# Patient Record
Sex: FEMALE | Race: WHITE | NOT HISPANIC OR LATINO | Employment: FULL TIME | URBAN - METROPOLITAN AREA
[De-identification: names, ages, dates, MRNs, and addresses within clinical notes are randomized per-mention and may not be internally consistent; named-entity substitution may affect disease eponyms.]

---

## 2017-01-06 ENCOUNTER — OFFICE VISIT (OUTPATIENT)
Dept: URGENT CARE | Facility: URGENT CARE | Age: 47
End: 2017-01-06
Payer: COMMERCIAL

## 2017-01-06 VITALS
OXYGEN SATURATION: 98 % | HEART RATE: 99 BPM | BODY MASS INDEX: 55.18 KG/M2 | TEMPERATURE: 98.3 F | DIASTOLIC BLOOD PRESSURE: 92 MMHG | SYSTOLIC BLOOD PRESSURE: 124 MMHG | WEIGHT: 293 LBS

## 2017-01-06 DIAGNOSIS — Z86.79 HISTORY OF HYPERTENSION: ICD-10-CM

## 2017-01-06 DIAGNOSIS — B37.31 VAGINAL YEAST INFECTION: Primary | ICD-10-CM

## 2017-01-06 DIAGNOSIS — H65.193 ACUTE MIDDLE EAR EFFUSION, BILATERAL: ICD-10-CM

## 2017-01-06 PROCEDURE — 99213 OFFICE O/P EST LOW 20 MIN: CPT | Performed by: FAMILY MEDICINE

## 2017-01-06 RX ORDER — NEBULIZER AND COMPRESSOR
EACH MISCELLANEOUS
Qty: 1 KIT | Refills: 0 | Status: SHIPPED | OUTPATIENT
Start: 2017-01-06 | End: 2018-05-10

## 2017-01-06 RX ORDER — DAPAGLIFLOZIN 5 MG/1
TABLET, FILM COATED ORAL
Refills: 1 | COMMUNITY
Start: 2016-07-12 | End: 2017-01-06

## 2017-01-06 RX ORDER — FLUCONAZOLE 150 MG/1
150 TABLET ORAL ONCE
Qty: 1 TABLET | Refills: 1 | Status: SHIPPED | OUTPATIENT
Start: 2017-01-06 | End: 2017-01-06

## 2017-01-06 NOTE — MR AVS SNAPSHOT
After Visit Summary   1/6/2017    Antonietta Christie    MRN: [id number]           Patient Information     Date Of Birth          1970        Visit Information        Provider Department      1/6/2017 8:05 PM Jose Barrios MD [facility]        Today's Diagnoses     Vaginal yeast infection    -  1     Acute middle ear effusion, bilateral         History of hypertension           Care Instructions      Fluid in the Middle Ear, No Infection (Adult)  Earaches can happen without an infection. This occurs when air and fluid build up behind the eardrum causing a feeling of fullness and discomfort and reduced hearing. This is called otitis media with effusion (OME) or serous otitis media. It means there is fluid in the middle ear. It is not the same as acute otitis media, which is typically from infection.  OME can happen when you have a cold if congestion blocks the passage that drains the middle ear (eustachian tube). It may also occur with nasal allergies or after a bacterial middle ear infection.    The pain/discomfort may come and go. You may hear clicking or popping sounds when you chew or swallow. You may feel that your balance is off. Or you may hear ringing in the ear.  It often takes from several weeks up to 3 months for the fluid to clear on its own. Oral pain relievers and ear drops help if there is pain. Decongestants and antihistamines sometimes help. Antibiotics don't help since there is no infection. Your doctor may prescribe a nasal spray to help reduce swelling in the nose and eustachian tube. This can allow the ear to drain.  If it doesn't improve after 3 months, surgery may be used to drain the fluid and insert a small tube in the eardrum to allow continued drainage.  Because the middle ear fluid can become infected, it is important to watch for signs of an ear infection which may develop later. These signs include increased ear pain, fever, or drainage from the  ear.  Home care  The following guidelines will help you care for yourself at home:    You may use acetaminophen or ibuprofen to control pain, unless another medicine was prescribed. [NOTE: If you have chronic liver or kidney disease or ever had a stomach ulcer or GI bleeding, talk with your doctor before using these medicines.] (Aspirin should never be used in anyone under 18 years of age who is ill with a fever. It may cause severe liver damage.)    While not always helpful, you may use over-the-counter decongestants such as phenylephrine or pseudoephedrine. Don't use nasal spray decongestants more than 3 days. Longer use can make congestion worse. (Prescription nasal sprays from your doctor don't typically have those restrictions.)    Antihistamines may help if you are also having allergy symptoms.    You may use medicines such as guaifenesin to thin mucus and promote drainage.  Follow-up care  Follow up with your doctor or as advised if you are not feeling better after 3 days.  When to seek medical care  Get prompt medical attention if any of the following occur:    Ear pain gets worse or does not start to improve     Fever of 100.4 F (38 C) or higher, or as directed by your health care provider    Fluid or blood draining from the ear    Headache or sinus pain    Stiff neck    Unusual drowsiness or confusion    6807-9926 [employer]. [address] [place] [address], [place] [address]. All rights reserved. This information is not intended as a substitute for professional medical care. Always follow your healthcare professional's instructions.              Follow-ups after your visit        Who to contact     If you have questions or need follow up information about today's clinic visit or your schedule please contact [facility] directly at [phone].  Normal or non-critical lab and imaging results will be communicated to you by MyChart, letter or phone within 4 business days after the  clinic has received the results. If you do not hear from us within 7 days, please contact the clinic through [facility] or phone. If you have a critical or abnormal lab result, we will notify you by phone as soon as possible.  Submit refill requests through [facility] or call your pharmacy and they will forward the refill request to us. Please allow 3 business days for your refill to be completed.          Additional Information About Your Visit        [employer] Information     [facility] gives you secure access to your electronic health record. If you see a primary care provider, you can also send messages to your care team and make appointments. If you have questions, please call your primary care clinic.  If you do not have a primary care provider, please call [phone] and they will assist you.        Care EveryWhere ID     This is your Care EveryWhere ID. This could be used by other organizations to access your [facility] medical records  [id number]        Your Vitals Were     Pulse Temperature Pulse Oximetry Breastfeeding?          99 98.3  F (36.8  C) (Oral) 98% No         Blood Pressure from Last 3 Encounters:   01/06/17 124/92   12/09/16 138/84   12/01/16 136/88    Weight from Last 3 Encounters:   01/06/17 379 lb (171.913 kg)   12/09/16 380 lb (172.367 kg)   12/01/16 380 lb (172.367 kg)              Today, you had the following     No orders found for display         Today's Medication Changes          These changes are accurate as of: 1/6/17  8:44 PM.  If you have any questions, ask your nurse or doctor.               Start taking these medicines.        Dose/Directions    Adult Blood Pressure Cuff Lg Kit   Used for:  History of hypertension   Started by:  Jose Barrios MD        Use as directed   Quantity:  1 kit   Refills:  0       fluconazole 150 MG tablet   Commonly known as:  DIFLUCAN   Used for:  Vaginal yeast infection   Started by:  Jose Barrios MD        Dose:  150 mg   Take 1 tablet  (150 mg) by mouth once for 1 dose   Quantity:  1 tablet   Refills:  1            Where to get your medicines      These medications were sent to [id number] - [place], [place] - [address] [address]     Phone:  [phone]    - Adult Blood Pressure Cuff Lg Kit  - fluconazole 150 MG tablet             Primary Care Provider Office Phone # Fax #    Aster Olivia España [phone]       [facility] [address]  [facility] [address]        Thank you!     Thank you for choosing [facility]  for your care. Our goal is always to provide you with excellent care. Hearing back from our patients is one way we can continue to improve our services. Please take a few minutes to complete the written survey that you may receive in the mail after your visit with us. Thank you!             Your Updated Medication List - Protect others around you: Learn how to safely use, store and throw away your medicines at www.disposemymeds.org.          This list is accurate as of: 1/6/17  8:44 PM.  Always use your most recent med list.                   Brand Name Dispense Instructions for use    Adult Blood Pressure Cuff Lg Kit     1 kit    Use as directed       aspirin 81 MG EC tablet     90 tablet    Take 1 tablet (81 mg) by mouth daily       dapagliflozin 10 MG Tabs tablet    FARXIGA    90 tablet    Take 1 tablet (10 mg) by mouth daily       fluconazole 150 MG tablet    DIFLUCAN    1 tablet    Take 1 tablet (150 mg) by mouth once for 1 dose       gabapentin 300 MG capsule    NEURONTIN    270 capsule    Take one capsule in the morning and 2 at night by mouth       hydrochlorothiazide 12.5 MG Tabs tablet     90 tablet    Take 1 tablet (12.5 mg) by mouth daily       ketoconazole 2 % Foam     100 g    Apply 1 Squirt topically 2 times daily       losartan 50 MG tablet    COZAAR    90 tablet    Take 1 tablet (50 mg) by mouth  daily       omeprazole 20 MG tablet     90 tablet    Take 1 tablet (20 mg) by mouth daily Take 30-60 minutes before a meal.       * order for DME      Resmed S9 Auto CPAP 8-12cm H2O, [employer] F[employer] for her small nasal mask       * order for DME     1 Device    Equipment being ordered: Glucometer per insurance preference, lancets, test strips.  Patient to check sugars oncedaily, 90 day supply for test strips and lancets, refill 3 times       * order for DME     1 Device    Equipment being ordered: blood pressure cuff/monitor       rOPINIRole 5 MG tablet    REQUIP    90 tablet    TAKE ONE TABLET BY MOUTH AT BEDTIME AS NEEDED FOR RESTLESS LEGS       STATIN NOT PRESCRIBED (INTENTIONAL)      Statin not prescribed intentionally due to Other patient declined  (This option does not exclude patient from measure)       * Notice:  This list has 3 medication(s) that are the same as other medications prescribed for you. Read the directions carefully, and ask your doctor or other care provider to review them with you.

## 2017-01-07 NOTE — PATIENT INSTRUCTIONS
Fluid in the Middle Ear, No Infection (Adult)  Earaches can happen without an infection. This occurs when air and fluid build up behind the eardrum causing a feeling of fullness and discomfort and reduced hearing. This is called otitis media with effusion (OME) or serous otitis media. It means there is fluid in the middle ear. It is not the same as acute otitis media, which is typically from infection.  OME can happen when you have a cold if congestion blocks the passage that drains the middle ear (eustachian tube). It may also occur with nasal allergies or after a bacterial middle ear infection.    The pain/discomfort may come and go. You may hear clicking or popping sounds when you chew or swallow. You may feel that your balance is off. Or you may hear ringing in the ear.  It often takes from several weeks up to 3 months for the fluid to clear on its own. Oral pain relievers and ear drops help if there is pain. Decongestants and antihistamines sometimes help. Antibiotics don't help since there is no infection. Your doctor may prescribe a nasal spray to help reduce swelling in the nose and eustachian tube. This can allow the ear to drain.  If it doesn't improve after 3 months, surgery may be used to drain the fluid and insert a small tube in the eardrum to allow continued drainage.  Because the middle ear fluid can become infected, it is important to watch for signs of an ear infection which may develop later. These signs include increased ear pain, fever, or drainage from the ear.  Home care  The following guidelines will help you care for yourself at home:    You may use acetaminophen or ibuprofen to control pain, unless another medicine was prescribed. [NOTE: If you have chronic liver or kidney disease or ever had a stomach ulcer or GI bleeding, talk with your doctor before using these medicines.] (Aspirin should never be used in anyone under 18 years of age who is ill with a fever. It may cause severe liver  damage.)    While not always helpful, you may use over-the-counter decongestants such as phenylephrine or pseudoephedrine. Don't use nasal spray decongestants more than 3 days. Longer use can make congestion worse. (Prescription nasal sprays from your doctor don't typically have those restrictions.)    Antihistamines may help if you are also having allergy symptoms.    You may use medicines such as guaifenesin to thin mucus and promote drainage.  Follow-up care  Follow up with your doctor or as advised if you are not feeling better after 3 days.  When to seek medical care  Get prompt medical attention if any of the following occur:    Ear pain gets worse or does not start to improve     Fever of 100.4 F (38 C) or higher, or as directed by your health care provider    Fluid or blood draining from the ear    Headache or sinus pain    Stiff neck    Unusual drowsiness or confusion    8592-9171 [employer]. [address] [place] [address], [place] [address]. All rights reserved. This information is not intended as a substitute for professional medical care. Always follow your healthcare professional's instructions.

## 2017-01-07 NOTE — NURSING NOTE
"Chief Complaint   Patient presents with     Otalgia     left ear       Initial /96 mmHg  Pulse 99  Temp(Src) 98.3  F (36.8  C) (Oral)  Wt 379 lb (171.913 kg)  SpO2 98%  Breastfeeding? No Estimated body mass index is 55.18 kg/(m^2) as calculated from the following:    Height as of 12/9/16: 5' 9.5\" (1.765 m).    Weight as of this encounter: 379 lb (171.913 kg).  BP completed using cuff size: regular on forearm  Laura Flower CMA      "

## 2017-01-07 NOTE — PROGRESS NOTES
Some of this note was populated by a medical assistant.      SUBJECTIVE:                                                    Antonietta Christie is a 46 year old female who presents to clinic today for the following health issues:      RESPIRATORY SYMPTOMS      Duration: increasing over few weeks    Description  ear pain left    Severity: moderate    Accompanying signs and symptoms: None    History (predisposing factors):  none    Precipitating or alleviating factors: None    Therapies tried and outcome:  none     Problem list and histories reviewed & adjusted, as indicated.  Additional history: as documented    Patient Active Problem List   Diagnosis     GERD (gastroesophageal reflux disease)     CARDIOVASCULAR SCREENING; LDL GOAL LESS THAN 160     Bunion of great toe of left foot     Insomnia     Restless legs syndrome (RLS)     Peripheral edema     Carpal tunnel syndrome     Obesity, morbid (H)     Hypertension goal BP (blood pressure) < 140/90     Seborrheic keratosis     CHELO (obstructive sleep apnea)- Moderate (AHI 26)     Type 2 diabetes, HbA1c goal < 7% (H)     Type 2 diabetes mellitus with hyperglycemia, without long-term current use of insulin (H)     Morbid obesity with BMI of 50.0-59.9, adult (H)     Past Surgical History   Procedure Laterality Date     Tonsillectomy & adenoidectomy  1979       Social History   Substance Use Topics     Smoking status: Never Smoker      Smokeless tobacco: Never Used     Alcohol Use: Yes      Comment: occasional     Family History   Problem Relation Age of Onset     Family History Negative No family hx of          Current Outpatient Prescriptions   Medication Sig Dispense Refill     order for DME Equipment being ordered: blood pressure cuff/monitor 1 Device 0     dapagliflozin (FARXIGA) 10 MG TABS tablet Take 1 tablet (10 mg) by mouth daily 90 tablet 1     gabapentin (NEURONTIN) 300 MG capsule Take one capsule in the morning and 2 at night by mouth 270 capsule 3     omeprazole 20  MG tablet Take 1 tablet (20 mg) by mouth daily Take 30-60 minutes before a meal. 90 tablet 3     hydrochlorothiazide 12.5 MG TABS tablet Take 1 tablet (12.5 mg) by mouth daily 90 tablet 3     rOPINIRole (REQUIP) 5 MG tablet TAKE ONE TABLET BY MOUTH AT BEDTIME AS NEEDED FOR RESTLESS LEGS 90 tablet 1     ketoconazole 2 % FOAM Apply 1 Squirt topically 2 times daily 100 g 1     losartan (COZAAR) 50 MG tablet Take 1 tablet (50 mg) by mouth daily 90 tablet 0     STATIN NOT PRESCRIBED, INTENTIONAL, Statin not prescribed intentionally due to Other patient declined   (This option does not exclude patient from measure)  0     aspirin 81 MG EC tablet Take 1 tablet (81 mg) by mouth daily 90 tablet 3     order for DME Equipment being ordered: Glucometer per insurance preference, lancets, test strips.  Patient to check sugars oncedaily, 90 day supply for test strips and lancets, refill 3 times 1 Device 0     ORDER FOR DME Resmed S9 Auto CPAP 8-12cm H2O, [employer] Fx for her small nasal mask       Allergies   Allergen Reactions     Lisinopril Cough     No Clinical Screening - See Comments Other (See Comments)     Hay fever (fall)--runny nose, sneezing, itchy eyes  Manganese violet dye in make up--red, itchy, mattery eyes       ROS:  Constitutional, HEENT, cardiovascular, pulmonary, gi and gu systems are negative, except as otherwise noted.    OBJECTIVE:                                                    /96 mmHg  Pulse 99  Temp(Src) 98.3  F (36.8  C) (Oral)  Wt 379 lb (171.913 kg)  SpO2 98%  Breastfeeding? No  Body mass index is 55.18 kg/(m^2).  GENERAL: healthy, alert and no distress  NECK: no adenopathy, no asymmetry, masses, or scars and thyroid normal to palpation  RESP: lungs clear to auscultation - no rales, rhonchi or wheezes  CV: regular rate and rhythm, normal S1 S2, no S3 or S4, no murmur, click or rub, no peripheral edema and peripheral pulses strong  ABDOMEN: soft, nontender, no hepatosplenomegaly, no masses  and bowel sounds normal  MS: no gross musculoskeletal defects noted, no edema          ASSESSMENT/PLAN:                                                        ICD-10-CM    1. Vaginal yeast infection B37.3 fluconazole (DIFLUCAN) 150 MG tablet   2. Acute middle ear effusion, bilateral H65.193    3. History of hypertension Z86.79 Blood Pressure Monitoring (ADULT BLOOD PRESSURE CUFF LG) KIT         PLAN  Patient educational/instructional material provided including reasons for follow-up   The patient indicates understanding of these issues and agrees with the plan.  Jose Barrios MD      [facility]

## 2017-02-16 DIAGNOSIS — E11.65 TYPE 2 DIABETES MELLITUS WITH HYPERGLYCEMIA (H): ICD-10-CM

## 2017-02-16 NOTE — TELEPHONE ENCOUNTER
aspirin 81 MG EC tablet      Last Written Prescription Date: 02/23/16  Last Fill Quantity: 90, # refills: 3  Last Office Visit with G, P or [facility] prescribing provider: 12/09/16        BP Readings from Last 3 Encounters:   01/06/17 (!) 124/92   12/09/16 138/84   12/01/16 136/88     Lab Results   Component Value Date    AST 63 02/22/2016     Lab Results   Component Value Date     02/22/2016     Creatinine   Date Value Ref Range Status   12/01/2016 0.78 0.52 - 1.04 mg/dL Final         Gracie yS  [facility]

## 2017-02-24 DIAGNOSIS — E11.65 TYPE 2 DIABETES MELLITUS WITH HYPERGLYCEMIA (H): ICD-10-CM

## 2017-02-24 DIAGNOSIS — I10 ESSENTIAL HYPERTENSION WITH GOAL BLOOD PRESSURE LESS THAN 140/90: ICD-10-CM

## 2017-02-24 NOTE — TELEPHONE ENCOUNTER
losartan (COZAAR) 50 MG tablet      Last Written Prescription Date: 7/6/16  Last Fill Quantity: 90, # refills: 0  Last Office Visit with G, P or [facility] prescribing provider: 12/9/16       Potassium   Date Value Ref Range Status   12/01/2016 4.5 3.4 - 5.3 mmol/L Final     Creatinine   Date Value Ref Range Status   12/01/2016 0.78 0.52 - 1.04 mg/dL Final     BP Readings from Last 3 Encounters:   01/06/17 (!) 124/92   12/09/16 138/84   12/01/16 136/88           Jayson Faarax  [facility]

## 2017-02-28 RX ORDER — LOSARTAN POTASSIUM 50 MG/1
50 TABLET ORAL DAILY
Qty: 30 TABLET | Refills: 0 | Status: SHIPPED | OUTPATIENT
Start: 2017-02-28 | End: 2017-05-24

## 2017-02-28 NOTE — TELEPHONE ENCOUNTER
Routing refill request to provider for review/approval because:  A break in medication  BP above goal  Geovanna Purcell RN

## 2017-03-17 ENCOUNTER — TELEPHONE (OUTPATIENT)
Dept: FAMILY MEDICINE | Facility: CLINIC | Age: 47
End: 2017-03-17

## 2017-03-17 NOTE — LETTER
[facility]  [address]  [facility] [address]  [phone]          Antonietta Christie  [address]  [facility] [address]          03/17/17      Dear Antonietta Christie        At [facility] we care about your health and are committed to providing quality patient care. Regular appointments are a vital part of the care and management of your health and can help prevent many of the complications that can occur.      It has come to our attention that you are due for an office visit for physical and diabetes follow up. Please call [facility] at [phone] soon to schedule your appointment.    If you have transferred care to another clinic please call to inform us so that we do not continue to send you reminder letters.      Sincerely,      [facility] Care Team

## 2017-03-17 NOTE — TELEPHONE ENCOUNTER
Panel Management Review      Fail List measure: Physical with pap & Diabetes.      Patient is due/failing the following:   A1C, PAP and PHYSICAL    Action needed:   Patient needs office visit for Physical with pap & Diabetes follow up.   When pt calls back please schedule physical or Diabetes follow up, please schedule labs before any appointment.    Type of outreach:    Phone, left message for patient to call back.  and Sent letter.    Questions for provider review:    None                                                                                                                                    Kelsey Arboleda CMA

## 2017-04-05 DIAGNOSIS — E11.65 TYPE 2 DIABETES MELLITUS WITH HYPERGLYCEMIA, WITHOUT LONG-TERM CURRENT USE OF INSULIN (H): ICD-10-CM

## 2017-04-05 NOTE — TELEPHONE ENCOUNTER
dapagliflozin (FARXIGA) 10 MG TABS tablet         Last Written Prescription Date: 12/09/16  Last Fill Quantity: 90, # refills: 1  Last Office Visit with [facility], [facility] or [facility] prescribing provider:  12/09/16        BP Readings from Last 3 Encounters:   01/06/17 (!) 124/92   12/09/16 138/84   12/01/16 136/88     Lab Results   Component Value Date    MICROL 13 12/01/2016     Lab Results   Component Value Date    UMALCR 13.40 12/01/2016     Creatinine   Date Value Ref Range Status   12/01/2016 0.78 0.52 - 1.04 mg/dL Final   ]  GFR Estimate   Date Value Ref Range Status   12/01/2016 79 >60 mL/min/1.7m2 Final     Comment:     Non  GFR Calc   02/22/2016 85 >60 mL/min/1.7m2 Final     Comment:     Non  GFR Calc   08/21/2015 80 >60 mL/min/1.7m2 Final     Comment:     Non  GFR Calc     GFR Estimate If Black   Date Value Ref Range Status   12/01/2016 >90   GFR Calc   >60 mL/min/1.7m2 Final   02/22/2016 >90   GFR Calc   >60 mL/min/1.7m2 Final   08/21/2015 >90   GFR Calc   >60 mL/min/1.7m2 Final     Lab Results   Component Value Date    CHOL 175 12/01/2016     Lab Results   Component Value Date    HDL 37 12/01/2016     Lab Results   Component Value Date     12/01/2016     Lab Results   Component Value Date    TRIG 154 12/01/2016     Lab Results   Component Value Date    CHOLHDLRATIO 5.1 07/01/2015     Lab Results   Component Value Date    AST 63 02/22/2016     Lab Results   Component Value Date     02/22/2016     Lab Results   Component Value Date    A1C 8.1 12/01/2016    A1C 7.9 02/22/2016    A1C 6.8 07/01/2015    A1C 6.2 08/13/2013    A1C 5.1 10/11/2011     Potassium   Date Value Ref Range Status   12/01/2016 4.5 3.4 - 5.3 mmol/L Final         [facility]

## 2017-04-07 RX ORDER — DAPAGLIFLOZIN 10 MG/1
TABLET, FILM COATED ORAL
Qty: 30 TABLET | Refills: 0 | Status: SHIPPED | OUTPATIENT
Start: 2017-04-07 | End: 2017-06-14

## 2017-04-07 NOTE — TELEPHONE ENCOUNTER
Medication is being filled for 1 time refill only due to:  Patient needs to be seen because she is due for an office visit.     MIAH Dowell, Clinical RN [facility].

## 2017-05-24 DIAGNOSIS — I10 ESSENTIAL HYPERTENSION WITH GOAL BLOOD PRESSURE LESS THAN 140/90: ICD-10-CM

## 2017-05-24 NOTE — TELEPHONE ENCOUNTER
losartan (COZAAR) 50 MG tablet      Last Written Prescription Date: 2/28/17  Last Fill Quantity: 30, # refills: 0  Last Office Visit with G, P or [facility] prescribing provider: 1/6/17 Hazel       Potassium   Date Value Ref Range Status   12/01/2016 4.5 3.4 - 5.3 mmol/L Final     Creatinine   Date Value Ref Range Status   12/01/2016 0.78 0.52 - 1.04 mg/dL Final     BP Readings from Last 3 Encounters:   01/06/17 (!) 124/92   12/09/16 138/84   12/01/16 136/88

## 2017-05-25 RX ORDER — LOSARTAN POTASSIUM 50 MG/1
TABLET ORAL
Qty: 30 TABLET | Refills: 0 | Status: SHIPPED | OUTPATIENT
Start: 2017-05-25 | End: 2017-06-14

## 2017-05-25 NOTE — TELEPHONE ENCOUNTER
Routing refill request to provider for review/approval because:  BP elevated at last visit    MIAH Dowell, Clinical RN [facility].

## 2017-06-03 DIAGNOSIS — G25.81 RESTLESS LEGS SYNDROME (RLS): ICD-10-CM

## 2017-06-03 NOTE — TELEPHONE ENCOUNTER
rOPINIRole (REQUIP) 5 MG tablet     Last Written Prescription Date: 12/1/16  Last Fill Quantity: 90, # refills: 1  Last Office Visit with [facility], [facility] or [facility] prescribing provider: 12/9/16        BP Readings from Last 3 Encounters:   01/06/17 (!) 124/92   12/09/16 138/84   12/01/16 136/88         Ida Ware  [facility]

## 2017-06-06 RX ORDER — ROPINIROLE 5 MG/1
TABLET, FILM COATED ORAL
Qty: 90 TABLET | Refills: 0 | Status: SHIPPED | OUTPATIENT
Start: 2017-06-06 | End: 2017-08-29

## 2017-06-06 NOTE — TELEPHONE ENCOUNTER
Reason for Call:  Other prescription    Detailed comments: Calling to ask for Rx of  rOPINIRole (REQUIP) 5 MG tablet to be filled completely out of medication and that she did make appointment for 06/14      Phone Number Patient can be reached at: Home number on file [phone] (home)      Best Time: Any    Can we leave a detailed message on this number? YES    Call taken on 6/6/2017 at 2:46 PM by Terra Knapp

## 2017-06-14 ENCOUNTER — OFFICE VISIT (OUTPATIENT)
Dept: FAMILY MEDICINE | Facility: CLINIC | Age: 47
End: 2017-06-14
Payer: COMMERCIAL

## 2017-06-14 VITALS
TEMPERATURE: 100.3 F | SYSTOLIC BLOOD PRESSURE: 128 MMHG | HEART RATE: 120 BPM | HEIGHT: 69 IN | BODY MASS INDEX: 43.4 KG/M2 | OXYGEN SATURATION: 95 % | DIASTOLIC BLOOD PRESSURE: 80 MMHG | WEIGHT: 293 LBS

## 2017-06-14 DIAGNOSIS — E11.65 TYPE 2 DIABETES MELLITUS WITH HYPERGLYCEMIA, WITHOUT LONG-TERM CURRENT USE OF INSULIN (H): Primary | ICD-10-CM

## 2017-06-14 DIAGNOSIS — I10 ESSENTIAL HYPERTENSION WITH GOAL BLOOD PRESSURE LESS THAN 140/90: ICD-10-CM

## 2017-06-14 DIAGNOSIS — F34.1 DYSTHYMIA: ICD-10-CM

## 2017-06-14 DIAGNOSIS — N76.0 VAGINITIS AND VULVOVAGINITIS: ICD-10-CM

## 2017-06-14 DIAGNOSIS — B35.4 TINEA CORPORIS: ICD-10-CM

## 2017-06-14 DIAGNOSIS — E66.01 MORBID OBESITY WITH BMI OF 50.0-59.9, ADULT (H): ICD-10-CM

## 2017-06-14 LAB
ANION GAP SERPL CALCULATED.3IONS-SCNC: 7 MMOL/L (ref 3–14)
BUN SERPL-MCNC: 10 MG/DL (ref 7–30)
CALCIUM SERPL-MCNC: 9.5 MG/DL (ref 8.5–10.1)
CHLORIDE SERPL-SCNC: 104 MMOL/L (ref 94–109)
CO2 SERPL-SCNC: 27 MMOL/L (ref 20–32)
CREAT SERPL-MCNC: 0.78 MG/DL (ref 0.52–1.04)
GFR SERPL CREATININE-BSD FRML MDRD: 79 ML/MIN/1.7M2
GLUCOSE SERPL-MCNC: 144 MG/DL (ref 70–99)
HBA1C MFR BLD: 7.2 % (ref 4.3–6)
POTASSIUM SERPL-SCNC: 4.2 MMOL/L (ref 3.4–5.3)
SODIUM SERPL-SCNC: 138 MMOL/L (ref 133–144)

## 2017-06-14 PROCEDURE — 83036 HEMOGLOBIN GLYCOSYLATED A1C: CPT | Performed by: FAMILY MEDICINE

## 2017-06-14 PROCEDURE — 80048 BASIC METABOLIC PNL TOTAL CA: CPT | Performed by: FAMILY MEDICINE

## 2017-06-14 PROCEDURE — 36415 COLL VENOUS BLD VENIPUNCTURE: CPT | Performed by: FAMILY MEDICINE

## 2017-06-14 PROCEDURE — 99214 OFFICE O/P EST MOD 30 MIN: CPT | Performed by: FAMILY MEDICINE

## 2017-06-14 RX ORDER — FLUCONAZOLE 150 MG/1
150 TABLET ORAL
Qty: 2 TABLET | Refills: 0 | Status: SHIPPED | OUTPATIENT
Start: 2017-06-14 | End: 2017-06-18

## 2017-06-14 RX ORDER — ATORVASTATIN CALCIUM 10 MG/1
10 TABLET, FILM COATED ORAL AT BEDTIME
Qty: 90 TABLET | Refills: 1 | Status: SHIPPED | OUTPATIENT
Start: 2017-06-14 | End: 2018-01-31

## 2017-06-14 RX ORDER — DAPAGLIFLOZIN 10 MG/1
10 TABLET, FILM COATED ORAL DAILY
Qty: 90 TABLET | Refills: 1 | Status: SHIPPED | OUTPATIENT
Start: 2017-06-14 | End: 2017-11-17

## 2017-06-14 RX ORDER — LOSARTAN POTASSIUM 50 MG/1
50 TABLET ORAL DAILY
Qty: 90 TABLET | Refills: 1 | Status: SHIPPED | OUTPATIENT
Start: 2017-06-14 | End: 2018-01-24

## 2017-06-14 NOTE — PROGRESS NOTES
SUBJECTIVE:                                                    Antonietta Christie is a 47 year old female who presents to clinic today for the following health issues:      Diabetes/obesity Follow-up      Patient is checking blood sugars: not at all    Diabetic concerns: frequent infections     Symptoms of hypoglycemia (low blood sugar): weak     Paresthesias (numbness or burning in feet) or sores: No     Date of last diabetic eye exam: done    Has not been checking sugars,     Hypertension Follow-up      Outpatient blood pressures are not being checked.    Low Salt Diet: not monitoring salt       Amount of exercise or physical activity: None    Problems taking medications regularly: No    Medication side effects: none    Diet: regular (no restrictions)    Worried that she might be depressed.  Symptoms present for over 1 year.  No suicidal or homicidal ideation.  Has not been eating right, paying bills, brushing teeth, etc.      Skin issue: for last few months, scaly scalp, curd like vaginal discharge and red rash in left axilla.    Problem list and histories reviewed & adjusted, as indicated.  Additional history: as documented    Patient Active Problem List   Diagnosis     GERD (gastroesophageal reflux disease)     CARDIOVASCULAR SCREENING; LDL GOAL LESS THAN 160     Bunion of great toe of left foot     Insomnia     Restless legs syndrome (RLS)     Peripheral edema     Carpal tunnel syndrome     Obesity, morbid (H)     Hypertension goal BP (blood pressure) < 140/90     Seborrheic keratosis     CHELO (obstructive sleep apnea)- Moderate (AHI 26)     Type 2 diabetes, HbA1c goal < 7% (H)     Type 2 diabetes mellitus with hyperglycemia, without long-term current use of insulin (H)     Morbid obesity with BMI of 50.0-59.9, adult (H)     Past Surgical History:   Procedure Laterality Date     TONSILLECTOMY & ADENOIDECTOMY  1979       Social History   Substance Use Topics     Smoking status: Never Smoker     Smokeless tobacco:  "Never Used     Alcohol use Yes      Comment: occasional     Family History   Problem Relation Age of Onset     Family History Negative No family hx of            Reviewed and updated as needed this visit by clinical staff       Reviewed and updated as needed this visit by Provider         ROS:  Constitutional, HEENT, cardiovascular, pulmonary, GI, , musculoskeletal, neuro, skin, endocrine and psych systems are negative, except as otherwise noted.    OBJECTIVE:                                                    /80 (BP Location: Left arm, Patient Position: Chair, Cuff Size: Adult Large)  Pulse 120  Temp 100.3  F (37.9  C) (Oral)  Ht 5' 9.29\" (1.76 m)  Wt (!) 370 lb (167.8 kg)  SpO2 95%  Breastfeeding? No  BMI 54.18 kg/m2  Body mass index is 54.18 kg/(m^2).  GENERAL: healthy, alert, no distress and obese  NECK: no adenopathy, no asymmetry, masses, or scars and thyroid normal to palpation  RESP: lungs clear to auscultation - no rales, rhonchi or wheezes  CV: regular rate and rhythm, normal S1 S2, no S3 or S4, no murmur, click or rub, no peripheral edema and peripheral pulses strong  ABDOMEN: soft, nontender, no hepatosplenomegaly, no masses and bowel sounds normal  MS: no gross musculoskeletal defects noted, no edema  SKIN: scaly rash right scalp, red macular rash with odor left axilla  PSYCH: mentation appears normal and tearful  Diabetic foot exam: normal DP and PT pulses, no trophic changes or ulcerative lesions, normal sensory exam and normal monofilament exam    Diagnostic Test Results:  Results for orders placed or performed in visit on 06/14/17 (from the past 24 hour(s))   Hemoglobin A1c   Result Value Ref Range    Hemoglobin A1C 7.2 (H) 4.3 - 6.0 %        ASSESSMENT/PLAN:                                                    1. Type 2 diabetes mellitus with hyperglycemia, without long-term current use of insulin (H)  Improved - continue current medication and make low carb eating changes.  - " Hemoglobin A1c; Future  - Basic metabolic panel; Future  - Hemoglobin A1c  - Basic metabolic panel  - atorvastatin (LIPITOR) 10 MG tablet; Take 1 tablet (10 mg) by mouth At Bedtime  Dispense: 90 tablet; Refill: 1  - dapagliflozin (FARXIGA) 10 MG TABS tablet; Take 1 tablet (10 mg) by mouth daily  Dispense: 90 tablet; Refill: 1    2. Morbid obesity with BMI of 50.0-59.9, adult (H)  Low carb eating changes    3. Dysthymia  Start zoloft and referral for therapy  - sertraline (ZOLOFT) 50 MG tablet; Take 1/2 tablet (25 mg) for 4 days, then increase to 1 tablet orally daily  Dispense: 30 tablet; Refill: 0  - MENTAL HEALTH REFERRAL    4. Vaginitis and vulvovaginitis  Oral treatment   - fluconazole (DIFLUCAN) 150 MG tablet; Take 1 tablet (150 mg) by mouth every 3 days for 2 doses  Dispense: 2 tablet; Refill: 0    5. Essential hypertension with goal blood pressure less than 140/90  Stable refilled  - losartan (COZAAR) 50 MG tablet; Take 1 tablet (50 mg) by mouth daily  Dispense: 90 tablet; Refill: 1    6. Tinea  Oral treatment and call in not improved in 5 days as abx may be needed.    The uses and side effects, including black box warnings as appropriate, were discussed in detail.  All patient questions were answered.  The patient was instructed to call immediately if any side effects developed.     FUTURE APPOINTMENTS:       - Follow-up visit in 1 month.    Aster España MD  [facility]

## 2017-06-14 NOTE — NURSING NOTE
"Chief Complaint   Patient presents with     Diabetes     Follow up.     Hypertension     Follow up.       Initial /80 (BP Location: Left arm, Patient Position: Chair, Cuff Size: Adult Large)  Pulse 120  Temp 100.3  F (37.9  C) (Oral)  Ht 5' 9.29\" (1.76 m)  Wt (!) 370 lb (167.8 kg)  SpO2 95%  Breastfeeding? No  BMI 54.18 kg/m2 Estimated body mass index is 54.18 kg/(m^2) as calculated from the following:    Height as of this encounter: 5' 9.29\" (1.76 m).    Weight as of this encounter: 370 lb (167.8 kg).  Medication Reconciliation: complete   An,CMA (AMAA)      "

## 2017-06-14 NOTE — MR AVS SNAPSHOT
After Visit Summary   6/14/2017    Antonietta Christie    MRN: [id number]           Patient Information     Date Of Birth          1970        Visit Information        Provider Department      6/14/2017 4:00 PM Aster Espinal MD [facility]        Today's Diagnoses     Type 2 diabetes mellitus with hyperglycemia, without long-term current use of insulin (H)    -  1    Morbid obesity with BMI of 50.0-59.9, adult (H)        Dysthymia        Vaginitis and vulvovaginitis        Essential hypertension with goal blood pressure less than 140/90        Tinea corporis          Care Instructions    Based on your medical history and these are the current health maintenance or preventive care services that you are due for (some may have been done at this visit)  Health Maintenance Due   Topic Date Due     EYE EXAM Q1 YEAR  02/25/1970     PAP SCREENING Q3 YR (SYSTEM ASSIGNED)  08/01/2016     FOOT EXAM Q1 YEAR  02/18/2017     BMP Q6 MOS  06/01/2017     A1C Q6 MO  06/01/2017         At [facility], we strive to deliver an exceptional experience to you, every time we see you.    If you receive a survey in the mail, please send us back your thoughts. We really do value your feedback.    Your care team's suggested websites for health information:  Www.[employer].org : Up to date and easily searchable information on multiple topics.  Www.medlineplus.gov : medication info, interactive tutorials, watch real surgeries online  Www.familydoctor.org : good info from the Academy of Family Physicians  Www.cdc.gov : public health info, travel advisories, epidemics (H1N1)  Www.aap.org : children's health info, normal development, vaccinations  Www.health.state[place]us : [place] dept of health, public health issues in [place], N1N1    How to contact your care team:   Team Lynette/Spirit [phone]         Pharmacy [phone]    Dr. Oliva, Martina Cuadra PA-C, Dr. Mack,  Bev URIBE CNP, Gabriella Jean Baptiste PA-C, Dr. Dugan, and RONY Du CNP    Team RNs: Candace & Johana      Clinic hours  M-Th 7 am-7 pm   Fri 7 am-5 pm.   Urgent care M-F 11 am-9 pm,   Sat/Sun 9 am-5 pm.  Pharmacy M-Th 8 am-8 pm Fri 8 am-6 pm  Sat/Sun 9 am-5 pm.     All password changes, disabled accounts, or ID changes in Blucarat/MyHealth will be done by our Access Services Department.    If you need help with your account or password, call: [phone]. Clinic staff no longer has the ability to change passwords.             Follow-ups after your visit        Additional Services     MENTAL HEALTH REFERRAL       Your provider has referred you to: [facility]: [facility] - Counseling (Individual/Couples/Family) - [facility] [phone]   [email or web address]   *Patient will be contacted by [facility] scheduling partner, [facility] ([facility]), to schedule an appointment.  Patients may also call [facility] to schedule.    All scheduling is subject to the client's specific insurance plan & benefits, provider/location availability, and provider clinical specialities.  Please arrive 15 minutes early for your first appointment and bring your completed paperwork.    Please be aware that coverage of these services is subject to the terms and limitations of your health insurance plan.  Call member services at your health plan with any benefit or coverage questions.                  Who to contact     If you have questions or need follow up information about today's clinic visit or your schedule please contact [facility] directly at [phone].  Normal or non-critical lab and imaging results will be communicated to you by bVisualhart, letter or phone within 4 business days after the clinic has received the results. If you do not hear from us within 7 days, please contact the clinic through Blucarat  "or phone. If you have a critical or abnormal lab result, we will notify you by phone as soon as possible.  Submit refill requests through Protonet or call your pharmacy and they will forward the refill request to us. Please allow 3 business days for your refill to be completed.          Additional Information About Your Visit        [employer] Dairy[employer] Information     Protonet gives you secure access to your electronic health record. If you see a primary care provider, you can also send messages to your care team and make appointments. If you have questions, please call your primary care clinic.  If you do not have a primary care provider, please call [phone] and they will assist you.        Care EveryWhere ID     This is your Care EveryWhere ID. This could be used by other organizations to access your [place] medical records  [id number]        Your Vitals Were     Pulse Temperature Height Pulse Oximetry Breastfeeding? BMI (Body Mass Index)    120 100.3  F (37.9  C) (Oral) 5' 9.29\" (1.76 m) 95% No 54.18 kg/m2       Blood Pressure from Last 3 Encounters:   06/14/17 128/80   01/06/17 (!) 124/92   12/09/16 138/84    Weight from Last 3 Encounters:   06/14/17 (!) 370 lb (167.8 kg)   01/06/17 (!) 379 lb (171.9 kg)   12/09/16 (!) 380 lb (172.4 kg)              We Performed the Following     Basic metabolic panel     Hemoglobin A1c     MENTAL HEALTH REFERRAL          Today's Medication Changes          These changes are accurate as of: 6/14/17  5:16 PM.  If you have any questions, ask your nurse or doctor.               Start taking these medicines.        Dose/Directions    atorvastatin 10 MG tablet   Commonly known as:  LIPITOR   Used for:  Type 2 diabetes mellitus with hyperglycemia, without long-term current use of insulin (H)   Started by:  Aster Espinal MD        Dose:  10 mg   Take 1 tablet (10 mg) by mouth At Bedtime   Quantity:  90 tablet   Refills:  1       fluconazole 150 MG tablet   Commonly known " as:  DIFLUCAN   Used for:  Vaginitis and vulvovaginitis   Started by:  Aster Espinal MD        Dose:  150 mg   Take 1 tablet (150 mg) by mouth every 3 days for 2 doses   Quantity:  2 tablet   Refills:  0       sertraline 50 MG tablet   Commonly known as:  ZOLOFT   Used for:  Dysthymia   Started by:  Aster Espinal MD        Take 1/2 tablet (25 mg) for 4 days, then increase to 1 tablet orally daily   Quantity:  30 tablet   Refills:  0         These medicines have changed or have updated prescriptions.        Dose/Directions    dapagliflozin 10 MG Tabs tablet   Commonly known as:  FARXIGA   This may have changed:  See the new instructions.   Used for:  Type 2 diabetes mellitus with hyperglycemia, without long-term current use of insulin (H)   Changed by:  Aster Espinal MD        Dose:  10 mg   Take 1 tablet (10 mg) by mouth daily   Quantity:  90 tablet   Refills:  1       losartan 50 MG tablet   Commonly known as:  COZAAR   This may have changed:  See the new instructions.   Used for:  Essential hypertension with goal blood pressure less than 140/90   Changed by:  Aster Espinal MD        Dose:  50 mg   Take 1 tablet (50 mg) by mouth daily   Quantity:  90 tablet   Refills:  1            Where to get your medicines      These medications were sent to [facility] [address] [facility] [employer] - [facility] [address] [facility]  [address] [facility] [address]     Phone:  [phone]     atorvastatin 10 MG tablet    dapagliflozin 10 MG Tabs tablet    fluconazole 150 MG tablet    losartan 50 MG tablet    sertraline 50 MG tablet                Primary Care Provider Office Phone # Fax #    Aster España [phone]       [facility] [address]  [facility] [address]        Thank you!     Thank you for choosing [facility]  for your care. Our goal is always to provide you with  excellent care. Hearing back from our patients is one way we can continue to improve our services. Please take a few minutes to complete the written survey that you may receive in the mail after your visit with us. Thank you!             Your Updated Medication List - Protect others around you: Learn how to safely use, store and throw away your medicines at www.disposemymeds.org.          This list is accurate as of: 6/14/17  5:16 PM.  Always use your most recent med list.                   Brand Name Dispense Instructions for use    Adult Blood Pressure Cuff Lg Kit     1 kit    Use as directed       aspirin 81 MG EC tablet     30 tablet    TAKE 1 TABLET BY MOUTH DAILY       atorvastatin 10 MG tablet    LIPITOR    90 tablet    Take 1 tablet (10 mg) by mouth At Bedtime       dapagliflozin 10 MG Tabs tablet    FARXIGA    90 tablet    Take 1 tablet (10 mg) by mouth daily       fluconazole 150 MG tablet    DIFLUCAN    2 tablet    Take 1 tablet (150 mg) by mouth every 3 days for 2 doses       gabapentin 300 MG capsule    NEURONTIN    270 capsule    Take one capsule in the morning and 2 at night by mouth       hydrochlorothiazide 12.5 MG Tabs tablet     90 tablet    Take 1 tablet (12.5 mg) by mouth daily       ketoconazole 2 % Foam     100 g    Apply 1 Squirt topically 2 times daily       losartan 50 MG tablet    COZAAR    90 tablet    Take 1 tablet (50 mg) by mouth daily       omeprazole 20 MG tablet     90 tablet    Take 1 tablet (20 mg) by mouth daily Take 30-60 minutes before a meal.       * order for DME      Resmed S9 Auto CPAP 8-12cm H2O, [place] Fx for her small nasal mask       * order for DME     1 Device    Equipment being ordered: Glucometer per insurance preference, lancets, test strips.  Patient to check sugars oncedaily, 90 day supply for test strips and lancets, refill 3 times       * order for DME     1 Device    Equipment being ordered: blood pressure cuff/monitor       rOPINIRole 5 MG tablet    REQUIP     90 tablet    TAKE ONE TABLET BY MOUTH AT BEDTIME AS NEEDED FOR RESTLESS LEGS       sertraline 50 MG tablet    ZOLOFT    30 tablet    Take 1/2 tablet (25 mg) for 4 days, then increase to 1 tablet orally daily       * Notice:  This list has 3 medication(s) that are the same as other medications prescribed for you. Read the directions carefully, and ask your doctor or other care provider to review them with you.

## 2017-06-14 NOTE — PATIENT INSTRUCTIONS
Based on your medical history and these are the current health maintenance or preventive care services that you are due for (some may have been done at this visit)  Health Maintenance Due   Topic Date Due     EYE EXAM Q1 YEAR  02/25/1970     PAP SCREENING Q3 YR (SYSTEM ASSIGNED)  08/01/2016     FOOT EXAM Q1 YEAR  02/18/2017     BMP Q6 MOS  06/01/2017     A1C Q6 MO  06/01/2017         At [facility], we strive to deliver an exceptional experience to you, every time we see you.    If you receive a survey in the mail, please send us back your thoughts. We really do value your feedback.    Your care team's suggested websites for health information:  Www.HitMeUp.org : Up to date and easily searchable information on multiple topics.  Www.medlineplus.gov : medication info, interactive tutorials, watch real surgeries online  Www.familydoctor.org : good info from the Academy of Family Physicians  Www.cdc.gov : public health info, travel advisories, epidemics (H1N1)  Www.aap.org : children's health info, normal development, vaccinations  Www.health.[facility].[place].us : [place] dept of health, public health issues in [place], N1N1    How to contact your care team:   Team Lynette/Spirit [phone]         Pharmacy [phone]    Dr. Oliva, Martina Cuadra PA-C, Dr. Mack, Bev URIBE CNP, Gabriella Jean Baptiste PA-C, Dr. Dugan, and RONY Du CNP    Team RNs: Candace Vaughn      Clinic hours  M-Th 7 am-7 pm   Fri 7 am-5 pm.   Urgent care M-F 11 am-9 pm,   Sat/Sun 9 am-5 pm.  Pharmacy M-Th 8 am-8 pm Fri 8 am-6 pm  Sat/Sun 9 am-5 pm.     All password changes, disabled accounts, or ID changes in 48domain/MyHealth will be done by our Access Services Department.    If you need help with your account or password, call: [phone]. Clinic staff no longer has the ability to change passwords.

## 2017-06-16 NOTE — PROGRESS NOTES
Ms. Christie,    Your kidney function is good.  Your diabetes looks like it has improved but continue the dietary changes we discussed as this could help with weight loss and overall improvement in the way you feel.  Follow up in 6 months for a recheck or as you need.    Please contact the clinic if you have additional questions.  Thank you.    Sincerely,    Aster España

## 2017-06-24 ENCOUNTER — HEALTH MAINTENANCE LETTER (OUTPATIENT)
Age: 47
End: 2017-06-24

## 2017-08-06 DIAGNOSIS — F34.1 DYSTHYMIA: ICD-10-CM

## 2017-08-06 NOTE — TELEPHONE ENCOUNTER
sertraline (ZOLOFT) 50 MG tablet     Last Written Prescription Date: 6/14/17  Last Fill Quantity: 30, # refills: 0  Last Office Visit with [facility] primary care provider:  6/14/17        Last PHQ-9 score on record= No flowsheet data found.          Jayson Faarax  [facility]

## 2017-08-07 PROBLEM — F32.9 MAJOR DEPRESSION: Status: ACTIVE | Noted: 2017-08-07

## 2017-08-09 NOTE — TELEPHONE ENCOUNTER
Directions of use, qty dispensed, time of refill request.   Routing to provider to review and advise.   Maryse Nassar RN

## 2017-08-29 DIAGNOSIS — G25.81 RESTLESS LEGS SYNDROME (RLS): ICD-10-CM

## 2017-08-29 RX ORDER — ROPINIROLE 5 MG/1
TABLET, FILM COATED ORAL
Qty: 90 TABLET | Refills: 2 | Status: SHIPPED | OUTPATIENT
Start: 2017-08-29 | End: 2018-05-04

## 2017-09-22 ENCOUNTER — TELEPHONE (OUTPATIENT)
Dept: FAMILY MEDICINE | Facility: CLINIC | Age: 47
End: 2017-09-22

## 2017-09-22 NOTE — TELEPHONE ENCOUNTER
Panel Management Review           Fail List measure: Pap      Patient is due/failing the following:   PAP    Action needed:   Patient needs office visit for Pap.    Type of outreach:    Phone, left message for patient to call back.  and Sent letter.    Questions for provider review:    None                                                                                                                                    José Luis Solis [facility]      Chart routed to Care Team .

## 2017-09-22 NOTE — LETTER
[address] [address]  [phone]  Dept: [phone]      September 22, 2017      Antonietta Christie  [address]  [facility] [address]    Dear Antonietta Christie,     At [facility] we care about your health and are committed to providing quality patient care.    Which includes staying current on preventive cancer screenings.  You can increase your chances of finding and treating cancers through regular screenings.      Our records indicate you may be due for the following preventive screening(s):    Cervical Cancer Screening    Pap smear is a screening test used to detect cervical cancer. It is recommended every three years for women 21 and older. The test should be done at least once every three years but women who are at greater risk for cervical cancer may need to have the test more often.    To schedule an appointment or discuss this screening further, you may contact us by phone at the [facility] at [phone] or online through the patient portal/[email or web address] @ [email or web address]    If you have had any of the screenings listed above at another facility, please call us so that we may update your chart.      Your partners in health,      Quality Committee at [facility]

## 2017-11-26 DIAGNOSIS — K21.9 GASTROESOPHAGEAL REFLUX DISEASE: Primary | ICD-10-CM

## 2017-11-29 NOTE — TELEPHONE ENCOUNTER
Prilosec:  Prescription approved per [facility] Refill Protocol.  Due for diabetic visit. Notes sent to pharmacy.     Muna Prado, RN, BSN

## 2018-01-24 DIAGNOSIS — I10 ESSENTIAL HYPERTENSION WITH GOAL BLOOD PRESSURE LESS THAN 140/90: ICD-10-CM

## 2018-01-24 NOTE — TELEPHONE ENCOUNTER
"Requested Prescriptions   Pending Prescriptions Disp Refills     losartan (COZAAR) 50 MG tablet [Pharmacy Med Name: LOSARTAN POTASSIUM 50 MG TAB]    Last Written Prescription Date:  6/14/17  Last Fill Quantity: 90,  # refills: 1   Last Office Visit with [facility], P or [facility] prescribing provider:  6/14/17   Future Office Visit:      90 tablet 1     Sig: TAKE 1 TABLET (50 MG) BY MOUTH DAILY    Angiotensin-II Receptors Passed    1/24/2018  1:51 AM       Passed - Blood pressure under 140/90 in past 12 months.    BP Readings from Last 3 Encounters:   06/14/17 128/80   01/06/17 (!) 124/92   12/09/16 138/84                Passed - Recent or future visit with authorizing provider's specialty    Patient had office visit in the last year or has a visit in the next 30 days with authorizing provider.  See \"Patient Info\" tab in inbasket, or \"Choose Columns\" in Meds & Orders section of the refill encounter.            Passed - Patient is age 18 or older       Passed - No active pregnancy on record       Passed - Normal serum creatinine on file in past 12 months    Recent Labs   Lab Test  06/14/17   1606   CR  0.78            Passed - Normal serum potassium on file in past 12 months    Recent Labs   Lab Test  06/14/17   1606   POTASSIUM  4.2                   Passed - No positive pregnancy test in past 12 months              Jayson Faarax  [facility]  "

## 2018-01-25 ENCOUNTER — TRANSFERRED RECORDS (OUTPATIENT)
Dept: HEALTH INFORMATION MANAGEMENT | Facility: CLINIC | Age: 48
End: 2018-01-25

## 2018-01-25 LAB — PAP SMEAR - HIM PATIENT REPORTED: NEGATIVE

## 2018-01-30 ENCOUNTER — TELEPHONE (OUTPATIENT)
Dept: CARE COORDINATION | Facility: CLINIC | Age: 48
End: 2018-01-30

## 2018-01-30 ENCOUNTER — CARE COORDINATION (OUTPATIENT)
Dept: CARE COORDINATION | Facility: CLINIC | Age: 48
End: 2018-01-30

## 2018-01-30 DIAGNOSIS — Z71.89 COMPLEX CARE COORDINATION: Primary | ICD-10-CM

## 2018-01-30 RX ORDER — LOSARTAN POTASSIUM 50 MG/1
TABLET ORAL
Qty: 90 TABLET | Refills: 0 | Status: SHIPPED | OUTPATIENT
Start: 2018-01-30 | End: 2018-01-31

## 2018-01-30 NOTE — TELEPHONE ENCOUNTER
"Last OV: 6/14/17    Requested Prescriptions   Pending Prescriptions Disp Refills     losartan (COZAAR) 50 MG tablet [Pharmacy Med Name: LOSARTAN POTASSIUM 50 MG TAB] 90 tablet 1     Sig: TAKE 1 TABLET (50 MG) BY MOUTH DAILY    Angiotensin-II Receptors Passed    1/24/2018  9:29 AM       Passed - Blood pressure under 140/90 in past 12 months.    BP Readings from Last 3 Encounters:   06/14/17 128/80   01/06/17 (!) 124/92   12/09/16 138/84                Passed - Recent or future visit with authorizing provider's specialty    Patient had office visit in the last year or has a visit in the next 30 days with authorizing provider.  See \"Patient Info\" tab in inbasket, or \"Choose Columns\" in Meds & Orders section of the refill encounter.            Passed - Patient is age 18 or older       Passed - No active pregnancy on record       Passed - Normal serum creatinine on file in past 12 months    Recent Labs   Lab Test  06/14/17   1606   CR  0.78            Passed - Normal serum potassium on file in past 12 months    Recent Labs   Lab Test  06/14/17   1606   POTASSIUM  4.2                   Passed - No positive pregnancy test in past 12 months        Prescription approved per [facility] Refill Protocol.    Parul Serrano RN, BSN    "

## 2018-01-30 NOTE — LETTER
[place] CARE COORDINATION  [address] [address]   January 30, 2018    Antonietta Christie  [address]  [facility] [address]      Dear Antonietta,    I am a clinic care coordinator who works with Aster España MD at [facility]. I wanted to thank you for spending the time to talk with me.  I wanted to introduce myself and provide you with my contact information so that you can call me with questions or concerns about your health care. Below is a description of clinic care coordination and how I can further assist you.     The clinic care coordinator is a registered nurse and/or  who understand the health care system. The goal of clinic care coordination is to help you manage your health and improve access to the [facility] system in the most efficient manner. The registered nurse can assist you in meeting your health care goals by providing education, coordinating services, and strengthening the communication among your providers. The  can assist you with financial, behavioral, psychosocial, chemical dependency, counseling, and/or psychiatric resources.    Please feel free to contact me at [phone], with any questions or concerns. We at [facility] are focused on providing you with the highest-quality healthcare experience possible and that all starts with you.     Sincerely,     Melissa Behl BSN, RN, N  [facility] Care Coordinator  [phone]       Enclosed: I have enclosed a copy of the Complex Care Plan. This has helpful information and goals that we have talked about. Please keep this in an easy to access place to use as needed.

## 2018-01-30 NOTE — TELEPHONE ENCOUNTER
DC'd from [facility] on 1-26-18 to home self care   Primary Problem: er discharge  LACE: 67 high risk

## 2018-01-30 NOTE — PROGRESS NOTES
Clinic Care Coordination Contact  OUTREACH    Referral Information:  Referral Source: [facility]  Reason for Contact: RN CC call to patient for ED follow up.  Care Conference: No     Universal Utilization:   ED Visits in last year: 1  Hospital visits in last year: 0  Last PCP appointment: 06/14/17  Missed Appointments: 0          Clinical Concerns:  Current Medical Concerns: Patient was at [facility] ED 1/25/18-1/26/18 for bronchitis.  Patient reports slight improvement in symptoms, but continues to endorse frequent coughing and wheezing.  Patient was not discharged on an inhaler or nebulizer.  Patient would like to schedule and ED f/u with her PCP.  RN CC assisted patient in scheduling a f/u appointment for 1/31/18.  Patient also has diagnoses of type 2 diabetes, hypertension, GERD, CHELO, RLS and morbid obesity.  Current Behavioral Concerns: Patient has diagnoses of depression and insomnia.  Patient was referred to a therapist 6/14/18 by her PCP and started her on Zoloft.  Did not address this contact and will discuss in the future.    Education Provided to patient: RN CC reviewed ED discharge instructions and educated patient on care coordination services.   Clinical Pathway Name: None      Medication Management:  Patient independent in medication management and verbalizes adherence and understanding of medication regimen.       Functional Status:  Mobility Status: Independent  Equipment Currently Used at Home: glucometer  Transportation: No concerns.           Psychosocial:  Current living arrangement:: I live in a private home with family  Financial/Insurance: No concerns.       Resources and Interventions:  Current Resources:  ;          Advanced Care Plans/Directives on file:: No        Goals:   Goal 1 Statement: I will take my antibiotics as prescribed.  Goal 1 Supportive Steps: RN CC reviewed ED discharge instructions with patient.  Goal 1 Progression Percent: 80%  Goal 1 Progression Date: 01/30/18               Barriers: unknown at this time  Strengths: patient verbalizes willingness to work with care coordination.  Patient/Caregiver understanding: Patient reports ongoing coughing and will soon be out of Tessalon pearls.  Patient verbalized understanding of ED discharge instructions and requested an appointment with her PCP for an ED follow up.  Patient verbalizes willingness to work with care coordination.  Frequency of Care Coordination: every 1-2 weeks  Upcoming appointment: 01/31/18     Plan:   Patient will see PCP as scheduled 1/31/18.  Patient will complete her antibiotics as prescribed.  RN CC will mail out care coordination letter, brochure and complex care plan.  RN CC will follow up with patient later this week.    Melissa Behl BSN, RN, PHN  [facility] Care Coordinator  [phone]

## 2018-01-30 NOTE — LETTER
[facility]  Complex Care Plan  About Me  Patient Name:  Antonietta Christie    YOB: 1970  Age:     47 year old   Jesus Alberto MRN:   [id number] Telephone Information:    Home Phone [phone]   Mobile [phone]       Address:    [address]  [facility] [address] Email address:  [email or web address]      Emergency Contact(s)  Name Relationship Lgl Grd Work Phone Home Phone Mobile Phone   ANDREA GIBBS Father   [phone]            Primary language:  English     needed? No   [place] [employer] [facility]:  [phone] op. 1  Other communication barriers: No  Preferred Method of Communication:  Phone  Current living arrangement: I live in a private home with family  Mobility Status/ Medical Equipment: Independent  Other information to know about me:    Health Maintenance  Health Maintenance Reviewed: Due/Overdue     My Access Plan  Medical Emergency 911   Primary Clinic Line [facility]- [phone]   24 Hour Appointment Line [phone] or  [phone]) (toll-free)   24 Hour Nurse Line [phone] (toll-free)   Preferred Urgent Care     Preferred Hospital [facility]  [phone]   Preferred Pharmacy [id number] - [place], [place] - [address] Behavioral Health Crisis Line The National Suicide Prevention Lifeline at 1-313.101.8615 or 911     My Care Team Members  Patient Care Team       Relationship Specialty Notifications Start End    Aster Espinal MD PCP - General Family Practice  3/5/10     Phone: [phone] Fax: [phone]         [address] [facility] [address]    Behl, Melissa K, RN Clinic Care Coordinator Nurse Admissions 1/30/18     Comment:  Phone: [phone]         My Care Plans  Self Management and Treatment Plan  Goals and (Comments)  Goal #1: I will take my antibiotics as prescribed.  Action Plans on File: None  Advance Care  Plans/Directives Type:        My Medical and Care Information  Problem List   Patient Active Problem List   Diagnosis     GERD (gastroesophageal reflux disease)     CARDIOVASCULAR SCREENING; LDL GOAL LESS THAN 160     Bunion of great toe of left foot     Insomnia     Restless legs syndrome (RLS)     Peripheral edema     Carpal tunnel syndrome     Obesity, morbid (H)     Hypertension goal BP (blood pressure) < 140/90     Seborrheic keratosis     CHELO (obstructive sleep apnea)- Moderate (AHI 26)     Type 2 diabetes, HbA1c goal < 7% (H)     Type 2 diabetes mellitus with hyperglycemia, without long-term current use of insulin (H)     Morbid obesity with BMI of 50.0-59.9, adult (H)     Major depression      Current Medications and Allergies:  See printed Medication Report.    Care Coordination Start Date:     Frequency of Care Coordination: every 1-2 weeks   Form Last Updated: 01/30/2018

## 2018-01-31 ENCOUNTER — OFFICE VISIT (OUTPATIENT)
Dept: FAMILY MEDICINE | Facility: CLINIC | Age: 48
End: 2018-01-31
Payer: COMMERCIAL

## 2018-01-31 VITALS
BODY MASS INDEX: 43.4 KG/M2 | HEIGHT: 69 IN | WEIGHT: 293 LBS | RESPIRATION RATE: 16 BRPM | SYSTOLIC BLOOD PRESSURE: 150 MMHG | TEMPERATURE: 98.9 F | OXYGEN SATURATION: 99 % | DIASTOLIC BLOOD PRESSURE: 88 MMHG | HEART RATE: 91 BPM

## 2018-01-31 DIAGNOSIS — J20.9 ACUTE BRONCHITIS WITH SYMPTOMS > 10 DAYS: Primary | ICD-10-CM

## 2018-01-31 DIAGNOSIS — E11.65 TYPE 2 DIABETES MELLITUS WITH HYPERGLYCEMIA, WITHOUT LONG-TERM CURRENT USE OF INSULIN (H): ICD-10-CM

## 2018-01-31 DIAGNOSIS — I10 HYPERTENSION GOAL BP (BLOOD PRESSURE) < 140/90: Chronic | ICD-10-CM

## 2018-01-31 LAB
ALBUMIN SERPL-MCNC: 3.7 G/DL (ref 3.4–5)
ALP SERPL-CCNC: 90 U/L (ref 40–150)
ALT SERPL W P-5'-P-CCNC: 64 U/L (ref 0–50)
ANION GAP SERPL CALCULATED.3IONS-SCNC: 8 MMOL/L (ref 3–14)
AST SERPL W P-5'-P-CCNC: 30 U/L (ref 0–45)
BILIRUB SERPL-MCNC: 0.3 MG/DL (ref 0.2–1.3)
BUN SERPL-MCNC: 14 MG/DL (ref 7–30)
CALCIUM SERPL-MCNC: 8.9 MG/DL (ref 8.5–10.1)
CHLORIDE SERPL-SCNC: 104 MMOL/L (ref 94–109)
CHOLEST SERPL-MCNC: 119 MG/DL
CO2 SERPL-SCNC: 26 MMOL/L (ref 20–32)
CREAT SERPL-MCNC: 0.71 MG/DL (ref 0.52–1.04)
CREAT UR-MCNC: 96 MG/DL
GFR SERPL CREATININE-BSD FRML MDRD: 88 ML/MIN/1.7M2
GLUCOSE SERPL-MCNC: 155 MG/DL (ref 70–99)
HBA1C MFR BLD: 7.2 % (ref 4.3–6)
HDLC SERPL-MCNC: 45 MG/DL
LDLC SERPL CALC-MCNC: 38 MG/DL
MICROALBUMIN UR-MCNC: 23 MG/L
MICROALBUMIN/CREAT UR: 23.73 MG/G CR (ref 0–25)
NONHDLC SERPL-MCNC: 74 MG/DL
POTASSIUM SERPL-SCNC: 4.2 MMOL/L (ref 3.4–5.3)
PROT SERPL-MCNC: 8.1 G/DL (ref 6.8–8.8)
SODIUM SERPL-SCNC: 138 MMOL/L (ref 133–144)
TRIGL SERPL-MCNC: 179 MG/DL
TSH SERPL DL<=0.005 MIU/L-ACNC: 3.43 MU/L (ref 0.4–4)

## 2018-01-31 PROCEDURE — 80061 LIPID PANEL: CPT | Performed by: FAMILY MEDICINE

## 2018-01-31 PROCEDURE — 84443 ASSAY THYROID STIM HORMONE: CPT | Performed by: FAMILY MEDICINE

## 2018-01-31 PROCEDURE — 80053 COMPREHEN METABOLIC PANEL: CPT | Performed by: FAMILY MEDICINE

## 2018-01-31 PROCEDURE — 36415 COLL VENOUS BLD VENIPUNCTURE: CPT | Performed by: FAMILY MEDICINE

## 2018-01-31 PROCEDURE — 82043 UR ALBUMIN QUANTITATIVE: CPT | Performed by: FAMILY MEDICINE

## 2018-01-31 PROCEDURE — 99214 OFFICE O/P EST MOD 30 MIN: CPT | Performed by: FAMILY MEDICINE

## 2018-01-31 PROCEDURE — 83036 HEMOGLOBIN GLYCOSYLATED A1C: CPT | Performed by: FAMILY MEDICINE

## 2018-01-31 RX ORDER — AZITHROMYCIN 250 MG/1
TABLET, FILM COATED ORAL
Refills: 0 | COMMUNITY
Start: 2018-01-26 | End: 2018-01-31

## 2018-01-31 RX ORDER — ALBUTEROL SULFATE 90 UG/1
2 AEROSOL, METERED RESPIRATORY (INHALATION) EVERY 6 HOURS PRN
Qty: 1 INHALER | Refills: 0 | Status: SHIPPED | OUTPATIENT
Start: 2018-01-31 | End: 2018-05-10

## 2018-01-31 RX ORDER — LOSARTAN POTASSIUM 100 MG/1
100 TABLET ORAL DAILY
Qty: 90 TABLET | Refills: 1 | Status: SHIPPED | OUTPATIENT
Start: 2018-01-31 | End: 2018-07-29

## 2018-01-31 RX ORDER — PREDNISONE 20 MG/1
40 TABLET ORAL DAILY
Qty: 10 TABLET | Refills: 0 | Status: SHIPPED | OUTPATIENT
Start: 2018-01-31 | End: 2018-02-05

## 2018-01-31 RX ORDER — DOXYCYCLINE 100 MG/1
100 CAPSULE ORAL 2 TIMES DAILY
Qty: 20 CAPSULE | Refills: 0 | Status: SHIPPED | OUTPATIENT
Start: 2018-01-31 | End: 2018-02-10

## 2018-01-31 RX ORDER — ATORVASTATIN CALCIUM 10 MG/1
10 TABLET, FILM COATED ORAL AT BEDTIME
Qty: 90 TABLET | Refills: 1 | Status: SHIPPED | OUTPATIENT
Start: 2018-01-31 | End: 2018-10-31

## 2018-01-31 RX ORDER — BENZONATATE 100 MG/1
100 CAPSULE ORAL
COMMUNITY
Start: 2018-01-25 | End: 2018-02-19

## 2018-01-31 NOTE — MR AVS SNAPSHOT
After Visit Summary   1/31/2018    Antonietta Christie    MRN: [id number]           Patient Information     Date Of Birth          1970        Visit Information        Provider Department      1/31/2018 4:20 PM Aster Espinal MD [facility]        Today's Diagnoses     Acute bronchitis with symptoms > 10 days    -  1    Hypertension goal BP (blood pressure) < 140/90        Type 2 diabetes mellitus with hyperglycemia, without long-term current use of insulin (H)           Follow-ups after your visit        Who to contact     If you have questions or need follow up information about today's clinic visit or your schedule please contact [facility] directly at [phone].  Normal or non-critical lab and imaging results will be communicated to you by MyChart, letter or phone within 4 business days after the clinic has received the results. If you do not hear from us within 7 days, please contact the clinic through [employer] or phone. If you have a critical or abnormal lab result, we will notify you by phone as soon as possible.  Submit refill requests through NPTV or call your pharmacy and they will forward the refill request to us. Please allow 3 business days for your refill to be completed.          Additional Information About Your Visit        MyChart Information     NPTV gives you secure access to your electronic health record. If you see a primary care provider, you can also send messages to your care team and make appointments. If you have questions, please call your primary care clinic.  If you do not have a primary care provider, please call [phone] and they will assist you.        Care EveryWhere ID     This is your Care EveryWhere ID. This could be used by other organizations to access your [facility] medical records  [id number]        Your Vitals Were     Pulse Temperature Respirations Height Pulse Oximetry BMI (Body Mass Index)  "   91 98.9  F (37.2  C) (Oral) 16 5' 9\" (1.753 m) 99% 55.08 kg/m2       Blood Pressure from Last 3 Encounters:   01/31/18 150/88   06/14/17 128/80   01/06/17 (!) 124/92    Weight from Last 3 Encounters:   01/31/18 (!) 373 lb (169.2 kg)   06/14/17 (!) 370 lb (167.8 kg)   01/06/17 (!) 379 lb (171.9 kg)              We Performed the Following     Albumin Random Urine Quantitative with Creat Ratio     Comprehensive metabolic panel     Hemoglobin A1c     Lipid panel reflex to direct LDL Non-fasting     TSH with free T4 reflex          Today's Medication Changes          These changes are accurate as of 1/31/18  4:55 PM.  If you have any questions, ask your nurse or doctor.               Start taking these medicines.        Dose/Directions    albuterol 108 (90 BASE) MCG/ACT Inhaler   Commonly known as:  PROAIR HFA/PROVENTIL HFA/VENTOLIN HFA   Used for:  Acute bronchitis with symptoms > 10 days   Started by:  Aster Espinal MD        Dose:  2 puff   Inhale 2 puffs into the lungs every 6 hours as needed for shortness of breath / dyspnea or wheezing   Quantity:  1 Inhaler   Refills:  0       doxycycline 100 MG capsule   Commonly known as:  VIBRAMYCIN   Used for:  Acute bronchitis with symptoms > 10 days   Started by:  Aster Espinal MD        Dose:  100 mg   Take 1 capsule (100 mg) by mouth 2 times daily for 10 days   Quantity:  20 capsule   Refills:  0       predniSONE 20 MG tablet   Commonly known as:  DELTASONE   Used for:  Acute bronchitis with symptoms > 10 days   Started by:  Aster Espinal MD        Dose:  40 mg   Take 2 tablets (40 mg) by mouth daily for 5 days   Quantity:  10 tablet   Refills:  0         These medicines have changed or have updated prescriptions.        Dose/Directions    losartan 100 MG tablet   Commonly known as:  COZAAR   This may have changed:  See the new instructions.   Used for:  Hypertension goal BP (blood pressure) < 140/90   Changed by:  " Aster Espinal MD        Dose:  100 mg   Take 1 tablet (100 mg) by mouth daily   Quantity:  90 tablet   Refills:  1       omeprazole 20 MG CR capsule   Commonly known as:  priLOSEC   This may have changed:  Another medication with the same name was removed. Continue taking this medication, and follow the directions you see here.   Used for:  Gastroesophageal reflux disease   Changed by:  Aster Espinal MD        TAKE 1 TABLET (20 MG) BY MOUTH DAILY TAKE 30-60 MINUTES BEFORE A MEAL.   Quantity:  90 capsule   Refills:  1            Where to get your medicines      These medications were sent to [place] [address] [facility] [address] [address]  [address] [facility] [address]     Phone:  [phone]     albuterol 108 (90 BASE) MCG/ACT Inhaler    atorvastatin 10 MG tablet    doxycycline 100 MG capsule    losartan 100 MG tablet    predniSONE 20 MG tablet                Primary Care Provider Office Phone # Fax #    Aster España [phone] [address]  [facility] [address]        Equal Access to Services     [facility]: Hadii aad ku hadasho Soomaali, waaxda luqadaha, qaybta kaalmada adeegyada, may hart haynicole brennan . So [facility] [phone].    ATENCIÓN: Si habla español, tiene a fischer disposición servicios gratuitos de asistencia lingüística. Lulu al [phone].    We comply with applicable federal civil rights laws and [place] laws. We do not discriminate on the basis of race, color, national origin, age, disability, sex, sexual orientation, or gender identity.            Thank you!     Thank you for choosing [facility]  for your care. Our goal is always to provide you with excellent care. Hearing back from our patients is one way we can continue to improve our services. Please take a few minutes to complete the written survey that you may receive in the mail after  your visit with us. Thank you!             Your Updated Medication List - Protect others around you: Learn how to safely use, store and throw away your medicines at www.disposemymeds.org.          This list is accurate as of 1/31/18  4:55 PM.  Always use your most recent med list.                   Brand Name Dispense Instructions for use Diagnosis    Adult Blood Pressure Cuff Lg Kit     1 kit    Use as directed    History of hypertension       albuterol 108 (90 BASE) MCG/ACT Inhaler    PROAIR HFA/PROVENTIL HFA/VENTOLIN HFA    1 Inhaler    Inhale 2 puffs into the lungs every 6 hours as needed for shortness of breath / dyspnea or wheezing    Acute bronchitis with symptoms > 10 days       aspirin 81 MG EC tablet     30 tablet    TAKE 1 TABLET BY MOUTH DAILY    Type 2 diabetes mellitus with hyperglycemia (H)       atorvastatin 10 MG tablet    LIPITOR    90 tablet    Take 1 tablet (10 mg) by mouth At Bedtime    Type 2 diabetes mellitus with hyperglycemia, without long-term current use of insulin (H)       benzonatate 100 MG capsule    TESSALON     Take 100 mg by mouth        doxycycline 100 MG capsule    VIBRAMYCIN    20 capsule    Take 1 capsule (100 mg) by mouth 2 times daily for 10 days    Acute bronchitis with symptoms > 10 days       FARXIGA 10 MG Tabs tablet   Generic drug:  dapagliflozin     90 tablet    TAKE 1 TABLET (10 MG) BY MOUTH DAILY    Type 2 diabetes mellitus with hyperglycemia, without long-term current use of insulin (H)       gabapentin 300 MG capsule    NEURONTIN    270 capsule    Take one capsule in the morning and 2 at night by mouth    Restless legs syndrome (RLS)       hydrochlorothiazide 12.5 MG Tabs tablet     90 tablet    Take 1 tablet (12.5 mg) by mouth daily    Hypertension goal BP (blood pressure) < 140/90       ketoconazole 2 % Foam     100 g    Apply 1 Squirt topically 2 times daily    Seborrheic dermatitis       losartan 100 MG tablet    COZAAR    90 tablet    Take 1 tablet (100 mg) by  mouth daily    Hypertension goal BP (blood pressure) < 140/90       omeprazole 20 MG CR capsule    priLOSEC    90 capsule    TAKE 1 TABLET (20 MG) BY MOUTH DAILY TAKE 30-60 MINUTES BEFORE A MEAL.    Gastroesophageal reflux disease       * order for DME      Resmed S9 Auto CPAP 8-12cm H2O, [employer] Fx for her small nasal mask        * order for DME     1 Device    Equipment being ordered: Glucometer per insurance preference, lancets, test strips.  Patient to check sugars oncedaily, 90 day supply for test strips and lancets, refill 3 times    Type 2 diabetes, HbA1c goal < 7% (H)       * order for DME     1 Device    Equipment being ordered: blood pressure cuff/monitor    Hypertension goal BP (blood pressure) < 140/90       predniSONE 20 MG tablet    DELTASONE    10 tablet    Take 2 tablets (40 mg) by mouth daily for 5 days    Acute bronchitis with symptoms > 10 days       rOPINIRole 5 MG tablet    REQUIP    90 tablet    TAKE ONE TABLET BY MOUTH AT BEDTIME AS NEEDED FOR RESTLESS LEGS    Restless legs syndrome (RLS)       sertraline 50 MG tablet    ZOLOFT    15 tablet    Take 1 tablet (50 mg) by mouth daily Needs to be seen for more.    Dysthymia       * Notice:  This list has 3 medication(s) that are the same as other medications prescribed for you. Read the directions carefully, and ask your doctor or other care provider to review them with you.

## 2018-01-31 NOTE — PROGRESS NOTES
SUBJECTIVE:   Antonietta Christie is a 47 year old female who presents to clinic today for the following health issues:      ED/UC Followup:    Facility:  [facility]  Date of visit: 1/25/2018  Reason for visit: Bronchitis  Current Status: better but not back to normal, coughing and fatigue     Symptoms present for about 1 month but got better in the middle for a short time.  About 10 days ago cough was worsening and traveled went seemed to exacerbate everything.  Seen in ED and given benzonatate and zithromax with last dose today. Productive cough, left ear fullness, runny nose, nasal congestion and mild sore throat currently.      DM2 - has not been regulating food intake well.    HTN - taking medication most days.    Problem list and histories reviewed & adjusted, as indicated.  Additional history: as documented    Patient Active Problem List   Diagnosis     GERD (gastroesophageal reflux disease)     CARDIOVASCULAR SCREENING; LDL GOAL LESS THAN 160     Bunion of great toe of left foot     Insomnia     Restless legs syndrome (RLS)     Peripheral edema     Carpal tunnel syndrome     Obesity, morbid (H)     Hypertension goal BP (blood pressure) < 140/90     Seborrheic keratosis     CHELO (obstructive sleep apnea)- Moderate (AHI 26)     Type 2 diabetes, HbA1c goal < 7% (H)     Type 2 diabetes mellitus with hyperglycemia, without long-term current use of insulin (H)     Morbid obesity with BMI of 50.0-59.9, adult (H)     Major depression     Past Surgical History:   Procedure Laterality Date     TONSILLECTOMY & ADENOIDECTOMY  1979       Social History   Substance Use Topics     Smoking status: Never Smoker     Smokeless tobacco: Never Used     Alcohol use Yes      Comment: occasional     Family History   Problem Relation Age of Onset     Family History Negative No family hx of            Reviewed and updated as needed this visit by clinical staff  Tobacco  Allergies  Meds  Problems  Med Hx  Surg Hx  Fam Hx  Soc Hx       "  Reviewed and updated as needed this visit by Provider  Tobacco  Allergies  Meds  Problems         ROS:  Constitutional, HEENT, cardiovascular, pulmonary, gi and gu systems are negative, except as otherwise noted.    OBJECTIVE:     /88  Pulse 91  Temp 98.9  F (37.2  C) (Oral)  Resp 16  Ht 5' 9\" (1.753 m)  Wt (!) 373 lb (169.2 kg)  SpO2 99%  BMI 55.08 kg/m2  Body mass index is 55.08 kg/(m^2).  GENERAL: healthy, alert, no distress and obese  EYES: Eyes grossly normal to inspection, PERRL and conjunctivae and sclerae normal  HENT: normal cephalic/atraumatic, ear canals and TM's normal, nasal mucosa edematous , oropharynx clear and oral mucous membranes moist  NECK: no adenopathy, no asymmetry, masses, or scars and thyroid normal to palpation  RESP: prolonged expiratory phase  CV: regular rate and rhythm, normal S1 S2, no S3 or S4, no murmur, click or rub, no peripheral edema and peripheral pulses strong  ABDOMEN: soft, nontender, no hepatosplenomegaly, no masses and bowel sounds normal  MS: no gross musculoskeletal defects noted, no edema  PSYCH: mentation appears normal, affect normal/bright    Diagnostic Test Results:  none     ASSESSMENT/PLAN:     1. Acute bronchitis with symptoms > 10 days  Treat with medications below  - predniSONE (DELTASONE) 20 MG tablet; Take 2 tablets (40 mg) by mouth daily for 5 days  Dispense: 10 tablet; Refill: 0  - doxycycline (VIBRAMYCIN) 100 MG capsule; Take 1 capsule (100 mg) by mouth 2 times daily for 10 days  Dispense: 20 capsule; Refill: 0  - albuterol (PROAIR HFA/PROVENTIL HFA/VENTOLIN HFA) 108 (90 BASE) MCG/ACT Inhaler; Inhale 2 puffs into the lungs every 6 hours as needed for shortness of breath / dyspnea or wheezing  Dispense: 1 Inhaler; Refill: 0    2. Hypertension goal BP (blood pressure) < 140/90  Not controlled - increase losartan  - losartan (COZAAR) 100 MG tablet; Take 1 tablet (100 mg) by mouth daily  Dispense: 90 tablet; Refill: 1    3. Type 2 diabetes " mellitus with hyperglycemia, without long-term current use of insulin (H)  Uncertain control - check labs and medication adjustment as indicated.  - TSH with free T4 reflex  - Hemoglobin A1c  - Comprehensive metabolic panel  - Lipid panel reflex to direct LDL Non-fasting  - Albumin Random Urine Quantitative with Creat Ratio  - atorvastatin (LIPITOR) 10 MG tablet; Take 1 tablet (10 mg) by mouth At Bedtime  Dispense: 90 tablet; Refill: 1    The uses and side effects, including black box warnings as appropriate, were discussed in detail.  All patient questions were answered.  The patient was instructed to call immediately if any side effects developed.     FUTURE APPOINTMENTS:       - Follow-up visit in 1 month.    Aster España MD  [facility]

## 2018-02-01 NOTE — PROGRESS NOTES
Ms. Christie,    Your labs are as expected.  Your diabetes is stable but is not yet at your goal of less than 7.0.  Your triglycerides are also high indicating that you may be eating too many carbohydrates.  Continue to work on improving your diet and I will see you in 3 - 4 weeks to recheck your blood pressure.    Please contact the clinic if you have additional questions.  Thank you.    Sincerely,    Aster España

## 2018-02-02 ENCOUNTER — CARE COORDINATION (OUTPATIENT)
Dept: CARE COORDINATION | Facility: CLINIC | Age: 48
End: 2018-02-02

## 2018-02-02 NOTE — PROGRESS NOTES
Clinic Care Coordination Contact  [facility]/Voicemail    Referral Source: [facility]  Clinical Data: Care Coordinator Outreach  Noted patient was seen by PCP on 1/31/18 and has a future appointment 2/15/18 for f/u.  Outreach attempted x 1.  Left message on voicemail with call back information and requested return call.  Plan: Care Coordinator mailed out care coordination introduction letter on 1/30/18. Care Coordinator will try to reach patient again in 5-10 business days.    Melissa Behl BSN, RN, PHN  [facility] Care Coordinator  [phone]

## 2018-02-19 ENCOUNTER — OFFICE VISIT (OUTPATIENT)
Dept: FAMILY MEDICINE | Facility: CLINIC | Age: 48
End: 2018-02-19
Payer: COMMERCIAL

## 2018-02-19 VITALS
OXYGEN SATURATION: 96 % | HEIGHT: 69 IN | HEART RATE: 86 BPM | TEMPERATURE: 97.9 F | DIASTOLIC BLOOD PRESSURE: 86 MMHG | WEIGHT: 293 LBS | BODY MASS INDEX: 43.4 KG/M2 | RESPIRATION RATE: 14 BRPM | SYSTOLIC BLOOD PRESSURE: 134 MMHG

## 2018-02-19 DIAGNOSIS — E11.65 TYPE 2 DIABETES MELLITUS WITH HYPERGLYCEMIA, WITHOUT LONG-TERM CURRENT USE OF INSULIN (H): Primary | ICD-10-CM

## 2018-02-19 DIAGNOSIS — N89.8 VAGINAL ITCHING: ICD-10-CM

## 2018-02-19 DIAGNOSIS — G25.81 RESTLESS LEGS SYNDROME (RLS): ICD-10-CM

## 2018-02-19 DIAGNOSIS — B37.31 CANDIDIASIS OF VAGINA: ICD-10-CM

## 2018-02-19 DIAGNOSIS — K21.9 GASTROESOPHAGEAL REFLUX DISEASE, ESOPHAGITIS PRESENCE NOT SPECIFIED: Chronic | ICD-10-CM

## 2018-02-19 DIAGNOSIS — E66.01 OBESITY, MORBID (H): Chronic | ICD-10-CM

## 2018-02-19 LAB
HBA1C MFR BLD: 7.4 % (ref 4.3–6)
SPECIMEN SOURCE: ABNORMAL
WET PREP SPEC: ABNORMAL

## 2018-02-19 PROCEDURE — 99214 OFFICE O/P EST MOD 30 MIN: CPT | Performed by: FAMILY MEDICINE

## 2018-02-19 PROCEDURE — 83036 HEMOGLOBIN GLYCOSYLATED A1C: CPT | Performed by: FAMILY MEDICINE

## 2018-02-19 PROCEDURE — 87210 SMEAR WET MOUNT SALINE/INK: CPT | Performed by: FAMILY MEDICINE

## 2018-02-19 PROCEDURE — 36415 COLL VENOUS BLD VENIPUNCTURE: CPT | Performed by: FAMILY MEDICINE

## 2018-02-19 RX ORDER — FLUCONAZOLE 150 MG/1
150 TABLET ORAL
Qty: 2 TABLET | Refills: 0 | Status: SHIPPED | OUTPATIENT
Start: 2018-02-19 | End: 2019-02-15

## 2018-02-19 RX ORDER — DAPAGLIFLOZIN 10 MG/1
10 TABLET, FILM COATED ORAL EVERY MORNING
Qty: 90 TABLET | Refills: 1 | Status: SHIPPED | OUTPATIENT
Start: 2018-02-19 | End: 2019-01-21

## 2018-02-19 RX ORDER — ROPINIROLE 5 MG/1
TABLET, FILM COATED ORAL
Qty: 90 TABLET | Refills: 2 | Status: CANCELLED | OUTPATIENT
Start: 2018-02-19

## 2018-02-19 ASSESSMENT — PATIENT HEALTH QUESTIONNAIRE - PHQ9: 5. POOR APPETITE OR OVEREATING: NOT AT ALL

## 2018-02-19 ASSESSMENT — ANXIETY QUESTIONNAIRES
5. BEING SO RESTLESS THAT IT IS HARD TO SIT STILL: NOT AT ALL
1. FEELING NERVOUS, ANXIOUS, OR ON EDGE: NOT AT ALL
3. WORRYING TOO MUCH ABOUT DIFFERENT THINGS: NOT AT ALL
IF YOU CHECKED OFF ANY PROBLEMS ON THIS QUESTIONNAIRE, HOW DIFFICULT HAVE THESE PROBLEMS MADE IT FOR YOU TO DO YOUR WORK, TAKE CARE OF THINGS AT HOME, OR GET ALONG WITH OTHER PEOPLE: NOT DIFFICULT AT ALL
7. FEELING AFRAID AS IF SOMETHING AWFUL MIGHT HAPPEN: NOT AT ALL
6. BECOMING EASILY ANNOYED OR IRRITABLE: SEVERAL DAYS
2. NOT BEING ABLE TO STOP OR CONTROL WORRYING: NOT AT ALL
GAD7 TOTAL SCORE: 1

## 2018-02-19 NOTE — MR AVS SNAPSHOT
After Visit Summary   2/19/2018    Antonietta Christie    MRN: [id number]           Patient Information     Date Of Birth          1970        Visit Information        Provider Department      2/19/2018 1:20 PM Aster Espinal MD [facility]        Today's Diagnoses     Type 2 diabetes mellitus with hyperglycemia, without long-term current use of insulin (H)    -  1    Obesity, morbid (H)        Restless legs syndrome (RLS)        Vaginal itching        Gastroesophageal reflux disease, esophagitis presence not specified        Candidiasis of vagina          Care Instructions    [facility] an exceptional experience to you, every time we see you.  If you receive a survey in the mail, please send us back your thoughts. We really do value your feedback.    Based on your medical history, these are the current health maintenance/preventive care services that you are due for (some may have been done at this visit.)  Health Maintenance Due   Topic Date Due     EYE EXAM Q1 YEAR  02/25/1971     PAP SCREENING Q3 YR (SYSTEM ASSIGNED)  08/01/2016     INFLUENZA VACCINE (SYSTEM ASSIGNED)  09/01/2017         Suggested websites for health information:  Www.[email or web address] : Up to date and easily searchable information on multiple topics.  Www.medlineplus.gov : medication info, interactive tutorials, watch real surgeries online  Www.familydoctor.org : good info from the Academy of Family Physicians  Www.cdc.gov : public health info, travel advisories, epidemics (H1N1)  Www.aap.org : children's health info, normal development, vaccinations  Www.health.[facility].[place].us : [place] dept of health, public health issues in [place], N1N1    Your care team:                            Family Medicine Internal Medicine   MD Amarjit Ortega MD Shantel Branch-Fleming, MD Katya Georgiev PA-C Megan Hill, RONY Osorio MD Pediatrics   Izaiah Enriquez,  "STEVE Weir CNP, MD Bethany Templen, MD Deborah Mielke, MD Kim Thein, APRN CNP      Clinic hours: Monday - Thursday 7 am-7 pm; Fridays 7 am-5 pm.   Urgent care: Monday - Friday 11 am-9 pm; Saturday and Sunday 9 am-5 pm.  Pharmacy : Monday -Thursday 8 am-8 pm; Friday 8 am-6 pm; Saturday and Sunday 9 am-5 pm.     Clinic: [phone]   Pharmacy: [phone]              Follow-ups after your visit        Who to contact     If you have questions or need follow up information about today's clinic visit or your schedule please contact [facility] directly at [phone].  Normal or non-critical lab and imaging results will be communicated to you by MyChart, letter or phone within 4 business days after the clinic has received the results. If you do not hear from us within 7 days, please contact the clinic through [email or web address] or phone. If you have a critical or abnormal lab result, we will notify you by phone as soon as possible.  Submit refill requests through [employer] or call your pharmacy and they will forward the refill request to us. Please allow 3 business days for your refill to be completed.          Additional Information About Your Visit        [email or web address] Information     [employer] gives you secure access to your electronic health record. If you see a primary care provider, you can also send messages to your care team and make appointments. If you have questions, please call your primary care clinic.  If you do not have a primary care provider, please call [phone] and they will assist you.        Care EveryWhere ID     This is your Care EveryWhere ID. This could be used by other organizations to access your [place] medical records  [id number]        Your Vitals Were     Pulse Temperature Respirations Height Pulse Oximetry BMI (Body Mass Index)    86 97.9  F (36.6  C) 14 5' 9\" (1.753 m) 96% 55.38 kg/m2       Blood Pressure from Last 3 " Encounters:   02/19/18 134/86   01/31/18 150/88   06/14/17 128/80    Weight from Last 3 Encounters:   02/19/18 (!) 375 lb (170.1 kg)   01/31/18 (!) 373 lb (169.2 kg)   06/14/17 (!) 370 lb (167.8 kg)              We Performed the Following     Hemoglobin A1c     Wet prep          Today's Medication Changes          These changes are accurate as of 2/19/18  2:58 PM.  If you have any questions, ask your nurse or doctor.               Start taking these medicines.        Dose/Directions    fluconazole 150 MG tablet   Commonly known as:  DIFLUCAN   Used for:  Candidiasis of vagina   Started by:  Aster Espinal MD        Dose:  150 mg   Take 1 tablet (150 mg) by mouth every 3 days for 5 days   Quantity:  2 tablet   Refills:  0       naltrexone-bupropion 8-90 MG per 12 hr tablet   Commonly known as:  CONTRAVE   Used for:  Obesity, morbid (H)   Started by:  Aster Espinal MD        Dose:  1 tablet   Take 1 tablet by mouth 2 times daily Start one by mouth daily for 4 days then increase to one by mouth twice daily.   Quantity:  60 tablet   Refills:  1         These medicines have changed or have updated prescriptions.        Dose/Directions    dapagliflozin 10 MG Tabs tablet   Commonly known as:  FARXIGA   This may have changed:  See the new instructions.   Used for:  Type 2 diabetes mellitus with hyperglycemia, without long-term current use of insulin (H)   Changed by:  Aster Espinal MD        Dose:  10 mg   Take 1 tablet (10 mg) by mouth every morning   Quantity:  90 tablet   Refills:  1       gabapentin 300 MG capsule   Commonly known as:  NEURONTIN   This may have changed:    - how much to take  - how to take this  - when to take this  - additional instructions   Used for:  Restless legs syndrome (RLS)        Take one capsule in the morning and 2 at night by mouth   Quantity:  270 capsule   Refills:  3            Where to get your medicines      These medications were  sent to [facility] [id number] [place] [facility] [place] - [address]  [address] [facility] [address]     Phone:  [phone]     dapagliflozin 10 MG Tabs tablet    fluconazole 150 MG tablet    naltrexone-bupropion 8-90 MG per 12 hr tablet                Primary Care Provider Office Phone # Fax #    Aster Olivia España [phone] [address]  [facility] [address]        Equal Access to Services     ANKITA [facility] Center[facility] : Hadii aad ku hadasho Soomaali, waaxda luqadaha, qaybta kaalmada adeegyada, waxay idiin hayaan adeeg kharash layamila . So [facility] [phone].    ATENCIÓN: Si habla español, tiene a fischer disposición servicios gratuitos de asistencia lingüística. Ll[facility] [phone].    We comply with applicable federal civil rights laws and [place] laws. We do not discriminate on the basis of race, color, national origin, age, disability, sex, sexual orientation, or gender identity.            Thank you!     Thank you for choosing [facility]  for your care. Our goal is always to provide you with excellent care. Hearing back from our patients is one way we can continue to improve our services. Please take a few minutes to complete the written survey that you may receive in the mail after your visit with us. Thank you!             Your Updated Medication List - Protect others around you: Learn how to safely use, store and throw away your medicines at www.disposemymeds.org.          This list is accurate as of 2/19/18  2:58 PM.  Always use your most recent med list.                   Brand Name Dispense Instructions for use Diagnosis    Adult Blood Pressure Cuff Lg Kit     1 kit    Use as directed    History of hypertension       albuterol 108 (90 BASE) MCG/ACT Inhaler    PROAIR HFA/PROVENTIL HFA/VENTOLIN HFA    1 Inhaler    Inhale 2 puffs into the lungs every 6 hours as needed for shortness of breath / dyspnea or wheezing    Acute bronchitis with  symptoms > 10 days       aspirin 81 MG EC tablet     30 tablet    TAKE 1 TABLET BY MOUTH DAILY    Type 2 diabetes mellitus with hyperglycemia (H)       atorvastatin 10 MG tablet    LIPITOR    90 tablet    Take 1 tablet (10 mg) by mouth At Bedtime    Type 2 diabetes mellitus with hyperglycemia, without long-term current use of insulin (H)       dapagliflozin 10 MG Tabs tablet    FARXIGA    90 tablet    Take 1 tablet (10 mg) by mouth every morning    Type 2 diabetes mellitus with hyperglycemia, without long-term current use of insulin (H)       fluconazole 150 MG tablet    DIFLUCAN    2 tablet    Take 1 tablet (150 mg) by mouth every 3 days for 5 days    Candidiasis of vagina       gabapentin 300 MG capsule    NEURONTIN    270 capsule    Take one capsule in the morning and 2 at night by mouth    Restless legs syndrome (RLS)       hydrochlorothiazide 12.5 MG Tabs tablet     90 tablet    Take 1 tablet (12.5 mg) by mouth daily    Hypertension goal BP (blood pressure) < 140/90       losartan 100 MG tablet    COZAAR    90 tablet    Take 1 tablet (100 mg) by mouth daily    Hypertension goal BP (blood pressure) < 140/90       naltrexone-bupropion 8-90 MG per 12 hr tablet    CONTRAVE    60 tablet    Take 1 tablet by mouth 2 times daily Start one by mouth daily for 4 days then increase to one by mouth twice daily.    Obesity, morbid (H)       omeprazole 20 MG CR capsule    priLOSEC    90 capsule    TAKE 1 TABLET (20 MG) BY MOUTH DAILY TAKE 30-60 MINUTES BEFORE A MEAL.    Gastroesophageal reflux disease       * order for DME      Resmed S9 Auto CPAP 8-12cm H2O, [place] Fx for her small nasal mask        * order for DME     1 Device    Equipment being ordered: Glucometer per insurance preference, lancets, test strips.  Patient to check sugars oncedaily, 90 day supply for test strips and lancets, refill 3 times    Type 2 diabetes, HbA1c goal < 7% (H)       * order for DME     1 Device    Equipment being ordered: blood pressure  cuff/monitor    Hypertension goal BP (blood pressure) < 140/90       rOPINIRole 5 MG tablet    REQUIP    90 tablet    TAKE ONE TABLET BY MOUTH AT BEDTIME AS NEEDED FOR RESTLESS LEGS    Restless legs syndrome (RLS)       * Notice:  This list has 3 medication(s) that are the same as other medications prescribed for you. Read the directions carefully, and ask your doctor or other care provider to review them with you.

## 2018-02-19 NOTE — PROGRESS NOTES
SUBJECTIVE:   Antonietta Christie is a 47 year old female who presents to clinic today for the following health issues:      Diabetes/obesity Follow-up      Patient is checking blood sugars: not at all    Diabetic concerns:yeast infection     Symptoms of hypoglycemia (low blood sugar): none     Paresthesias (numbness or burning in feet) or sores: No     Date of last diabetic eye exam: 12/2017    No eating changes made since last visit.    Hyperlipidemia Follow-Up      Rate your low fat/cholesterol diet?: good    Taking statin?  Yes, no muscle aches from statin    Other lipid medications/supplements?:  none    Hypertension Follow-up      Outpatient blood pressures are not being checked.    Low Salt Diet: no added salt    BP Readings from Last 2 Encounters:   02/19/18 134/86   01/31/18 150/88     Hemoglobin A1C (%)   Date Value   02/19/2018 7.4 (H)   01/31/2018 7.2 (H)     LDL Cholesterol Calculated (mg/dL)   Date Value   01/31/2018 38   12/01/2016 107 (H)       Amount of exercise or physical activity: no    Problems taking medications regularly: No    Medication side effects: none    Diet: low salt, low fat/cholesterol and diabetic    RLS - stable with current treatment.  Taking gabapentin only at night still.    Vaginal itching for few weeks.  Symptoms off and on for few months.  Try OTC vaginal gel and not improved.    GERD - stable with current treatment.    Problem list and histories reviewed & adjusted, as indicated.  Additional history: as documented    Patient Active Problem List   Diagnosis     GERD (gastroesophageal reflux disease)     CARDIOVASCULAR SCREENING; LDL GOAL LESS THAN 160     Bunion of great toe of left foot     Insomnia     Restless legs syndrome (RLS)     Peripheral edema     Carpal tunnel syndrome     Obesity, morbid (H)     Hypertension goal BP (blood pressure) < 140/90     Seborrheic keratosis     CHELO (obstructive sleep apnea)- Moderate (AHI 26)     Type 2 diabetes, HbA1c goal < 7% (H)     Type  "2 diabetes mellitus with hyperglycemia, without long-term current use of insulin (H)     Morbid obesity with BMI of 50.0-59.9, adult (H)     Major depression     Past Surgical History:   Procedure Laterality Date     TONSILLECTOMY & ADENOIDECTOMY  1979       Social History   Substance Use Topics     Smoking status: Never Smoker     Smokeless tobacco: Never Used     Alcohol use Yes      Comment: occasional     Family History   Problem Relation Age of Onset     Family History Negative No family hx of            Reviewed and updated as needed this visit by clinical staff  Tobacco  Allergies  Meds  Problems       Reviewed and updated as needed this visit by Provider  Allergies  Meds  Problems         ROS:  Constitutional, HEENT, cardiovascular, pulmonary, GI, , musculoskeletal, neuro, skin, endocrine and psych systems are negative, except as otherwise noted.    OBJECTIVE:     /86  Pulse 86  Temp 97.9  F (36.6  C)  Resp 14  Ht 5' 9\" (1.753 m)  Wt (!) 375 lb (170.1 kg)  SpO2 96%  BMI 55.38 kg/m2  Body mass index is 55.38 kg/(m^2).  GENERAL: healthy, alert, no distress and obese  NECK: no adenopathy, no asymmetry, masses, or scars and thyroid normal to palpation  RESP: lungs clear to auscultation - no rales, rhonchi or wheezes  CV: regular rate and rhythm, normal S1 S2, no S3 or S4, no murmur, click or rub, no peripheral edema and peripheral pulses strong  ABDOMEN: soft, nontender, no hepatosplenomegaly, no masses and bowel sounds normal  MS: no gross musculoskeletal defects noted, no edema  PSYCH: mentation appears normal, affect normal/bright    Diagnostic Test Results:  Results for orders placed or performed in visit on 02/19/18 (from the past 24 hour(s))   Wet prep   Result Value Ref Range    Specimen Description Vagina     Wet Prep Yeast seen (A)     Wet Prep No Trichomonas seen     Wet Prep No clue cells seen    Hemoglobin A1c   Result Value Ref Range    Hemoglobin A1C 7.4 (H) 4.3 - 6.0 % "       ASSESSMENT/PLAN:     1. Type 2 diabetes mellitus with hyperglycemia, without long-term current use of insulin (H)  Still slightly above goal - continue current treatments and discussed dietary changes  - Hemoglobin A1c  - dapagliflozin (FARXIGA) 10 MG TABS tablet; Take 1 tablet (10 mg) by mouth every morning  Dispense: 90 tablet; Refill: 1    2. Obesity, morbid (H)  Not improved - trial of contrave.  - naltrexone-bupropion (CONTRAVE) 8-90 MG per 12 hr tablet; Take 1 tablet by mouth 2 times daily Start one by mouth daily for 4 days then increase to one by mouth twice daily.  Dispense: 60 tablet; Refill: 1    3. Restless legs syndrome (RLS)  Continue current treatment    4. Vaginal itching    - Wet prep    5. Gastroesophageal reflux disease, esophagitis presence not specified  Stable on current treatment    6. Candidiasis of vagina  Oral treatment.  - fluconazole (DIFLUCAN) 150 MG tablet; Take 1 tablet (150 mg) by mouth every 3 days for 5 days  Dispense: 2 tablet; Refill: 0    The uses and side effects, including black box warnings as appropriate, were discussed in detail.  All patient questions were answered.  The patient was instructed to call immediately if any side effects developed.     FUTURE APPOINTMENTS:       - Follow-up visit in 1 month.    Aster España MD  [facility]

## 2018-02-19 NOTE — NURSING NOTE
"Chief Complaint   Patient presents with     Hypertension     Vaginal Problem     couple month       Initial /90  Pulse 86  Temp 97.9  F (36.6  C)  Resp 14  Ht 5' 9\" (1.753 m)  Wt (!) 375 lb (170.1 kg)  SpO2 96%  BMI 55.38 kg/m2 Estimated body mass index is 55.38 kg/(m^2) as calculated from the following:    Height as of this encounter: 5' 9\" (1.753 m).    Weight as of this encounter: 375 lb (170.1 kg).  Medication Reconciliation: complete     Omi Giordano. MA      "

## 2018-02-19 NOTE — PATIENT INSTRUCTIONS
At [facility], we strive to deliver an exceptional experience to you, every time we see you.  If you receive a survey in the mail, please send us back your thoughts. We really do value your feedback.    Based on your medical history, these are the current health maintenance/preventive care services that you are due for (some may have been done at this visit.)  Health Maintenance Due   Topic Date Due     EYE EXAM Q1 YEAR  02/25/1971     PAP SCREENING Q3 YR (SYSTEM ASSIGNED)  08/01/2016     INFLUENZA VACCINE (SYSTEM ASSIGNED)  09/01/2017         Suggested websites for health information:  Www.[employer] : Up to date and easily searchable information on multiple topics.  Www.medlineplus.gov : medication info, interactive tutorials, watch real surgeries online  Www.familydoctor.org : good info from the Academy of Family Physicians  Www.cdc.gov : public health info, travel advisories, epidemics (H1N1)  Www.aap.org : children's health info, normal development, vaccinations  Www.health.[facility].[place].us : [place] dept of health, public health issues in [place], N1N1    Your care team:                            Family Medicine Internal Medicine   MD Amarjit Ortega MD Shantel Branch-Fleming, MD Katya Georgiev PA-C Megan Hill, APRN STEVE sOorio MD Pediatrics   Izaiah Enriquez PALINDA Silva, STEVE Driscoll APRN CNP   MD Kayla Trinh MD Deborah Mielke, MD Kim Thein, APRN [facility]      Clinic hours: Monday - Thursday 7 am-7 pm; Fridays 7 am-5 pm.   Urgent care: Monday - Friday 11 am-9 pm; Saturday and Sunday 9 am-5 pm.  Pharmacy : Monday -Thursday 8 am-8 pm; Friday 8 am-6 pm; Saturday and Sunday 9 am-5 pm.     Clinic: [phone]   Pharmacy: [phone]

## 2018-02-20 ENCOUNTER — TELEPHONE (OUTPATIENT)
Dept: FAMILY MEDICINE | Facility: CLINIC | Age: 48
End: 2018-02-20

## 2018-02-20 ASSESSMENT — ANXIETY QUESTIONNAIRES: GAD7 TOTAL SCORE: 1

## 2018-02-20 ASSESSMENT — PATIENT HEALTH QUESTIONNAIRE - PHQ9: SUM OF ALL RESPONSES TO PHQ QUESTIONS 1-9: 7

## 2018-02-20 NOTE — TELEPHONE ENCOUNTER
Plan does not cover naltrexone-bupropion (CONTRAVE) 8-90 MG per 12 hr tablet.  Please call [phone] to initiate Prior Auth or change med.    Insurance type and ID number: ID# [id number]      Additional Information:     Gracie Sy

## 2018-02-26 DIAGNOSIS — E11.65 TYPE 2 DIABETES MELLITUS WITH HYPERGLYCEMIA (H): ICD-10-CM

## 2018-02-26 NOTE — TELEPHONE ENCOUNTER
"Requested Prescriptions   Pending Prescriptions Disp Refills     aspirin 81 MG EC tablet [Pharmacy Med Name: ASPIRIN EC 81 MG TABLET]  Last Written Prescription Date:  02/21/17  Last Fill Quantity: 30,  # refills: 9   Last Office Visit with [facility], P or [facility] prescribing provider:  02/19/18   Future Office Visit:    30 tablet 4     Sig: TAKE 1 TABLET BY MOUTH DAILY    Analgesics (Non-Narcotic Tylenol and ASA Only) Passed    2/26/2018  1:09 AM       Passed - Recent or future visit with authorizing provider's specialty    Patient had office visit in the last year or has a visit in the next 30 days with authorizing provider.  See \"Patient Info\" tab in inbasket, or \"Choose Columns\" in Meds & Orders section of the refill encounter.            Passed - Patient is age 20 years or older    If ASA is flagged for ages under 20 years old. Forward to provider for confirmation Ryes Syndrome is not a concern.                "

## 2018-02-27 NOTE — TELEPHONE ENCOUNTER
"Requested Prescriptions   Signed Prescriptions Disp Refills     aspirin 81 MG EC tablet 30 tablet 4     Sig: TAKE 1 TABLET BY MOUTH DAILY    Analgesics (Non-Narcotic Tylenol and ASA Only) Passed    2/26/2018  9:19 AM       Passed - Recent or future visit with authorizing provider's specialty    Patient had office visit in the last year or has a visit in the next 30 days with authorizing provider.  See \"Patient Info\" tab in inbasket, or \"Choose Columns\" in Meds & Orders section of the refill encounter.            Passed - Patient is age 20 years or older    If ASA is flagged for ages under 20 years old. Forward to provider for confirmation Ryes Syndrome is not a concern.              Prescription approved per [facility] Refill Protocol.    Parul Serrano RN, BSN    "

## 2018-03-05 ENCOUNTER — TELEPHONE (OUTPATIENT)
Dept: FAMILY MEDICINE | Facility: CLINIC | Age: 48
End: 2018-03-05

## 2018-03-05 DIAGNOSIS — E66.01 OBESITY, MORBID (H): Primary | Chronic | ICD-10-CM

## 2018-03-05 DIAGNOSIS — G25.81 RESTLESS LEGS SYNDROME (RLS): ICD-10-CM

## 2018-03-05 NOTE — TELEPHONE ENCOUNTER
Requested Prescriptions   Pending Prescriptions Disp Refills     gabapentin (NEURONTIN) 300 MG capsule [Pharmacy Med Name: GABAPENTIN 300 MG CAPSULE]  Last Written Prescription Date:  12/09/16  Last Fill Quantity: 270,  # refills: 3   Last Office Visit with G, P or [facility] prescribing provider:  02/19/18   Future Office Visit:    270 capsule 3     Sig: TAKE ONE CAPSULE IN THE MORNING AND 2 AT NIGHT BY MOUTH    There is no refill protocol information for this order

## 2018-03-06 NOTE — TELEPHONE ENCOUNTER
Requested Prescriptions   Pending Prescriptions Disp Refills     gabapentin (NEURONTIN) 300 MG capsule [Pharmacy Med Name: GABAPENTIN 300 MG CAPSULE] 270 capsule 3     Sig: TAKE ONE CAPSULE IN THE MORNING AND 2 AT NIGHT BY MOUTH    There is no refill protocol information for this order        Routing refill request to provider for review/approval because:  Drug not on the [facility] refill protocol     Parul Serrano RN, BSN

## 2018-03-08 ENCOUNTER — TELEPHONE (OUTPATIENT)
Dept: FAMILY MEDICINE | Facility: CLINIC | Age: 48
End: 2018-03-08

## 2018-03-08 RX ORDER — GABAPENTIN 300 MG/1
CAPSULE ORAL
Qty: 270 CAPSULE | Refills: 3 | OUTPATIENT
Start: 2018-03-08

## 2018-03-08 NOTE — TELEPHONE ENCOUNTER
Reason for Call:  Other prescription    Detailed comments: Antonietta called and is in currently in [place] and asking if you could resend her lorcaserin (BELVIQ) 10 MG tablet to [facility] [address] [place] [address]   She checked with local pharmacy that you originally sent it to and they are not able to transfer it.  Please call and let her know if and when you are able to resend this.  Thank you     Phone Number Patient can be reached at: Home number on file [phone] (home)    Best Time: Any    Can we leave a detailed message on this number? YES    Call taken on 3/8/2018 at 4:32 PM by Terra Knapp

## 2018-03-08 NOTE — TELEPHONE ENCOUNTER
This writer attempted to contact patient  on 03/08/18      Reason for call Patient to schedule a telephone visit to address refill request and left message.      If patient calls back:   Schedule Telephone Visit appointment within 2 business days with PCP, explain charges and schedule appointment.         Aisha Xavier MA

## 2018-03-08 NOTE — TELEPHONE ENCOUNTER
It appears that you've been off this medication for about 3 months.  Pls schedule a phone visit with Dr. Solomon España to discuss.  Freya URIBE, CNP

## 2018-03-08 NOTE — TELEPHONE ENCOUNTER
Patient called back and states that there has not been a break in medication.  She did mention that at one point that that she was only taking 2 of the Dax-  pentin instead of 3 so she has some extra meds right now and does not need a refill.  Patient states that she is out of town all of March and will see Dr Solomon España in   April.    Aisha Xavier MA/  For Teams [place]

## 2018-03-08 NOTE — TELEPHONE ENCOUNTER
Patient also said her CONTRAVE from [facility] was denied by insurance  She called them and they said she can appeal it   Asking if  you can send insurance a letter of medical necessity   Marked urgent to process in 24 hours    Fax to [facility] at [phone]  include patient name and     Call her if any questions    Thank you

## 2018-03-08 NOTE — TELEPHONE ENCOUNTER
Faxed new Rx for the Lorcaserin (Belviq) to [facility], [place],  [phone], right fax confirmed at 3:35 pm today.  Called and spoke to patient and explained Dr Solomon España's message  And that the Rx was sent to her pharmacy. She states that she will call the pharmacy  And let them know that she won't be there to  as she is out of town.  Aisha Xavier MA/  For Isai Xavier MA/  For [employer] and Lynette

## 2018-03-08 NOTE — TELEPHONE ENCOUNTER
We would have to try something else first or do they not cover any of them? We could try lorcaserin instead, rx completed and in TC basket.

## 2018-03-09 ENCOUNTER — CARE COORDINATION (OUTPATIENT)
Dept: CARE COORDINATION | Facility: CLINIC | Age: 48
End: 2018-03-09

## 2018-03-09 NOTE — PROGRESS NOTES
Clinic Care Coordination Contact  [facility]/Voicemail    Referral Source: Non-[facility]  Clinical Data: Care Coordinator Outreach  Outreach attempted x 1.  Left message on voicemail with call back information and requested return call.  Plan: Care Coordinator mailed out care coordination introduction letter on 1/30/18. Care Coordinator will try to reach patient again in 5-10 business days.    Melissa Behl BSN, RN, PHN  [facility] Care Coordinator  [phone]

## 2018-03-09 NOTE — TELEPHONE ENCOUNTER
Called [facility] pharmacy and cancelled Rx for  The Lorcaserin that was faxed yesterday. Pharmacy cancelled.  Faxed Rx for the Locaserin to [facility] in [place], [phone],  Right fax confirmed at 3:49 pm today.  Called and spoke to patient and explained that this has been cancelled and  Faxed to her pharmacy in [place].  Aisha Xavier MA/  For [employer] and Lynette

## 2018-03-10 ENCOUNTER — MYC MEDICAL ADVICE (OUTPATIENT)
Dept: FAMILY MEDICINE | Facility: CLINIC | Age: 48
End: 2018-03-10

## 2018-03-12 NOTE — TELEPHONE ENCOUNTER
Flagging for MA as there might be a prior auth involed.  Routing to the provider to advise.  Aisha Xavier MA/  For [employer] [place]

## 2018-04-23 ENCOUNTER — TELEPHONE (OUTPATIENT)
Dept: FAMILY MEDICINE | Facility: CLINIC | Age: 48
End: 2018-04-23

## 2018-04-23 NOTE — TELEPHONE ENCOUNTER
Panel Management Review      BP Readings from Last 1 Encounters:   02/19/18 134/86      Last Office Visit with this department: 3/8/2018    Fail List measure: PAP      Patient is due/failing the following:   PAP    Action needed:   Patient needs office visit for PHYSICAL.    Type of outreach:    Sent letter.    Questions for provider review:    None                                                                                                                                    Liseth Loving MA      Chart routed to  .

## 2018-04-23 NOTE — LETTER
April 23, 2018    Antonietta Christie  [address]  [place] [facility] [address]      Dear Antonietta Christie,     In order to ensure we are providing the best quality care, we have reviewed your chart and see that you are due for:    Physical with pap smear: Pap smear is a screening test used to detect cervical cancer. It is recommended every three years for women 21 and older. The test should be done at least once every three years but women who are at greater risk for cervical cancer may need to have the test more often.    Please call the clinic at your earliest convenience to schedule an appointment. If you have had any of the screenings listed above at another facility, please call us so that we may update your chart.      Thank you for trusting us with your health care.    Sincerely,    [facility]/[phone]  [phone]

## 2018-05-04 ENCOUNTER — TELEPHONE (OUTPATIENT)
Dept: FAMILY MEDICINE | Facility: CLINIC | Age: 48
End: 2018-05-04

## 2018-05-04 ENCOUNTER — VIRTUAL VISIT (OUTPATIENT)
Dept: FAMILY MEDICINE | Facility: OTHER | Age: 48
End: 2018-05-04

## 2018-05-04 DIAGNOSIS — G25.81 RESTLESS LEGS SYNDROME (RLS): ICD-10-CM

## 2018-05-04 NOTE — TELEPHONE ENCOUNTER
E-visit instructions given to Antonietta to further discuss vaginal symptoms. Updated Antonietta over the phone as to how to go about requesting the E-visit and she will access this when she is not driving.      Parul Serrano RN, BSN

## 2018-05-04 NOTE — PROGRESS NOTES
"Date:   Clinician: Israel Silav  Clinician NPI: [id number]  Patient: Antonietta Christie  Patient : 1970  Patient Address: [address], [place] [address]  Patient Phone: [phone]  Visit Protocol: UTI  Patient Summary:  Antonietta is a 48 year old ( : 1970 ) female who initiated a Visit for a presumed bladder infection. When asked the question \"Please sign me up to receive news, health information and promotions. \", Antonietta responded \"No\".    Her symptoms began yesterday and consist of urinary frequency, dysuria, and urgency.   Symptom Details   Urinary Frequency: Every hour    She denies flank pain, loss of appetite, feeling feverish, recent antibiotic use, chills, foul smelling urine, nausea, hesitation, hematuria, urinary incontinence, vaginal discharge, abdominal pain, and vomiting. Antonietta has never had kidney stones. She has not been hospitalized, been a patient in a nursing home, or had a catheter in the past two weeks. She denies risk factors for sexually transmitted infections.   Antonietta has had two (2) UTIs in the past 12 months. Her most recent bladder infection was not within the last 4 weeks. Her current symptoms are similar to the previous UTI symptoms. She took an antibiotic for her last infection but does not remember which one.    Antonietta typically gets yeast infections when she takes antibiotics.  She denies pregnancy and denies breastfeeding. She does not menstruate.   Additional information as reported by the patient (free text): i also currently have a yeast infection  MEDICATIONS:      Omeprazole oral    Requip oral    Gabapentin oral    Farxiga oral    Losartan oral    Aspir-Low oral    , ALLERGIES:   NKDA    Clinician Response:  Dear Antonietta,  Based on the information you have provided, you likely have a bladder infection, also called acute urinary tract infection (UTI).   To treat your infection, I am prescribing:   Cephalexin (Keflex) 500 mg oral capsule. Take " 1 capsule by mouth every 12 hours for 7 days. Continue taking the capsules even if you feel better before all of the medication is gone. There are no refills with this prescription.  Some women may develop a yeast infection as a side effect of taking antibiotics. Because you noted that you typically get yeast infections when you are taking antibiotics, I am also prescribing:   Fluconazole (Diflucan) 150 mg oral tablet. Take 1 tablet by mouth 1 time a day for 1 day. Take this medication only if you notice symptoms of a yeast infection (vaginal discharge that is white, thick, and odorless). There are no refills with this prescription.  Some people develop allergies to antibiotics. If you notice a new rash, significant swelling, or difficulty breathing, stop the medication immediately and go into a clinic for physical evaluation.   If you become pregnant during this course of treatment, stop taking the medication and contact your primary care provider.   To help treat your current UTI and prevent future occurrences, remember to:     Drink 8-10, 8-ounce glasses of water daily.    Urinate after sexual intercourse.    Wipe front to back after using the bathroom.     You should visit a clinic for a follow-up visit if your symptoms do not improve in 1-2 days or if you experience another urinary tract infection soon after completing this treatment.   Diagnosis: Acute uncomplicated bladder infection  Diagnosis ICD: N39.0  Prescription: cephalexin (Keflex) 500 mg oral capsule 14 capsule, 7 days supply. Take 1 capsule by mouth every 12 hours for 7 days. Refills: 0, Refill as needed: no, Allow substitutions: yes  Prescription: fluconazole (Diflucan) 150 mg oral tablet 1 1 tablet blister pack, 1 days supply. Take 1 tablet by mouth 1 time a day for 1 day. Refills: 0, Refill as needed: no, Allow substitutions: yes  Pharmacy: [facility] [id number] [facility] - [phone] - [address] [phone]

## 2018-05-04 NOTE — TELEPHONE ENCOUNTER
Reason for Call:  Other prescription    Detailed comments: Pt calling for she is dealing with a bladder infection and a yeast infection.  Since Pt is out of town she is hoping she could be prescribed medication without having to make an apt.    Phone Number Patient can be reached at: Home number on file [phone] (home)    Best Time: anytime    Can we leave a detailed message on this number? YES    Call taken on 5/4/2018 at 1:36 PM by Bucky Newman

## 2018-05-08 RX ORDER — ROPINIROLE 5 MG/1
TABLET, FILM COATED ORAL
Qty: 90 TABLET | Refills: 2 | Status: SHIPPED | OUTPATIENT
Start: 2018-05-08 | End: 2019-01-25

## 2018-05-08 NOTE — TELEPHONE ENCOUNTER
Prescription approved per [facility] Refill Protocol.    Parul Serrano RN, BSN

## 2018-05-10 ENCOUNTER — OFFICE VISIT (OUTPATIENT)
Dept: FAMILY MEDICINE | Facility: CLINIC | Age: 48
End: 2018-05-10
Payer: COMMERCIAL

## 2018-05-10 VITALS
HEART RATE: 82 BPM | SYSTOLIC BLOOD PRESSURE: 132 MMHG | HEIGHT: 69 IN | TEMPERATURE: 98.6 F | BODY MASS INDEX: 43.4 KG/M2 | DIASTOLIC BLOOD PRESSURE: 82 MMHG | WEIGHT: 293 LBS | OXYGEN SATURATION: 97 %

## 2018-05-10 DIAGNOSIS — G25.81 RESTLESS LEGS SYNDROME (RLS): ICD-10-CM

## 2018-05-10 DIAGNOSIS — R35.0 URINARY FREQUENCY: ICD-10-CM

## 2018-05-10 DIAGNOSIS — E11.65 TYPE 2 DIABETES MELLITUS WITH HYPERGLYCEMIA, WITHOUT LONG-TERM CURRENT USE OF INSULIN (H): Primary | ICD-10-CM

## 2018-05-10 DIAGNOSIS — I10 ESSENTIAL HYPERTENSION WITH GOAL BLOOD PRESSURE LESS THAN 140/90: ICD-10-CM

## 2018-05-10 LAB
ALBUMIN UR-MCNC: NEGATIVE MG/DL
APPEARANCE UR: ABNORMAL
BACTERIA #/AREA URNS HPF: ABNORMAL /HPF
BILIRUB UR QL STRIP: NEGATIVE
COLOR UR AUTO: YELLOW
GLUCOSE UR STRIP-MCNC: >=1000 MG/DL
HBA1C MFR BLD: 7 % (ref 0–5.6)
HGB UR QL STRIP: ABNORMAL
KETONES UR STRIP-MCNC: NEGATIVE MG/DL
LEUKOCYTE ESTERASE UR QL STRIP: NEGATIVE
NITRATE UR QL: NEGATIVE
NON-SQ EPI CELLS #/AREA URNS LPF: ABNORMAL /LPF
PH UR STRIP: 5.5 PH (ref 5–7)
RBC #/AREA URNS AUTO: ABNORMAL /HPF
SOURCE: ABNORMAL
SP GR UR STRIP: 1.01 (ref 1–1.03)
UROBILINOGEN UR STRIP-ACNC: 0.2 EU/DL (ref 0.2–1)
WBC #/AREA URNS AUTO: ABNORMAL /HPF

## 2018-05-10 PROCEDURE — 87086 URINE CULTURE/COLONY COUNT: CPT | Performed by: FAMILY MEDICINE

## 2018-05-10 PROCEDURE — 99214 OFFICE O/P EST MOD 30 MIN: CPT | Performed by: FAMILY MEDICINE

## 2018-05-10 PROCEDURE — 81001 URINALYSIS AUTO W/SCOPE: CPT | Performed by: FAMILY MEDICINE

## 2018-05-10 PROCEDURE — 83036 HEMOGLOBIN GLYCOSYLATED A1C: CPT | Performed by: FAMILY MEDICINE

## 2018-05-10 PROCEDURE — 36415 COLL VENOUS BLD VENIPUNCTURE: CPT | Performed by: FAMILY MEDICINE

## 2018-05-10 RX ORDER — CEPHALEXIN 500 MG/1
500 TABLET ORAL 2 TIMES DAILY
COMMUNITY
End: 2018-05-10 | Stop reason: ALTCHOICE

## 2018-05-10 RX ORDER — FLUCONAZOLE 150 MG/1
150 TABLET ORAL
Qty: 2 TABLET | Refills: 0 | Status: SHIPPED | OUTPATIENT
Start: 2018-05-10 | End: 2019-02-15

## 2018-05-10 RX ORDER — NITROFURANTOIN 25; 75 MG/1; MG/1
100 CAPSULE ORAL 2 TIMES DAILY
Qty: 14 CAPSULE | Refills: 0 | Status: SHIPPED | OUTPATIENT
Start: 2018-05-10 | End: 2019-02-15

## 2018-05-10 ASSESSMENT — PAIN SCALES - GENERAL: PAINLEVEL: NO PAIN (0)

## 2018-05-10 NOTE — NURSING NOTE
Composite Cancer Quality Initiative    Cervical Cancer Initiative satisfied?  NO - Patient will call to schedule AFE with pap     Updated by: ADELINE Garg MA

## 2018-05-10 NOTE — PATIENT INSTRUCTIONS
At [facility], we strive to deliver an exceptional experience to you, every time we see you.  If you receive a survey in the mail, please send us back your thoughts. We really do value your feedback.    Based on your medical history, these are the current health maintenance/preventive care services that you are due for (some may have been done at this visit.)  Health Maintenance Due   Topic Date Due     EYE EXAM Q1 YEAR  02/25/1970     HIV SCREEN (SYSTEM ASSIGNED)  02/25/1987     PAP SCREENING Q3 YR (SYSTEM ASSIGNED)  08/01/2016       Suggested websites for health information:  Www.Balanced[employer] : Up to date and easily searchable information on multiple topics.  Www.medlineplus.gov : medication info, interactive tutorials, watch real surgeries online  Www.familydoctor.org : good info from the Academy of Family Physicians  Www.cdc.gov : public health info, travel advisories, epidemics (H1N1)  Www.aap.org : children's health info, normal development, vaccinations  Www.health.[facility].[place].us : [place] dept of health, public health issues in [place], N1N1    Your care team:                            Family Medicine Internal Medicine   MD Amarjit Ortega MD Shantel Branch-Fleming, MD Katya Georgiev PA-C Megan Hill, APRN STEVE Osorio MD Pediatrics   Izaiah Enriquez PALINDA Silva, MD Bev Del Rio APRN CNP   MD Kayla Trinh MD Deborah Mielke, MD Kim Thein, APRN [facility]      Clinic hours: Monday - Thursday 7 am-7 pm; Fridays 7 am-5 pm.   Urgent care: Monday - Friday 11 am-9 pm; Saturday and Sunday 9 am-5 pm.  Pharmacy : Monday -Thursday 8 am-8 pm; Friday 8 am-6 pm; Saturday and Sunday 9 am-5 pm.     Clinic: [phone]   Pharmacy: [phone]

## 2018-05-10 NOTE — PROGRESS NOTES
SUBJECTIVE:   Antonietta Christie is a 48 year old female who presents to clinic today for the following health issues:      Diabetes Follow-up      Patient is checking blood sugars: not at all    Diabetic concerns: None     Symptoms of hypoglycemia (low blood sugar): none     Paresthesias (numbness or burning in feet) or sores: Yes Numbness Left foot      Date of last diabetic eye exam: 12/17    Hyperlipidemia Follow-Up      Rate your low fat/cholesterol diet?: not monitoring fat    Taking statin?  Yes, no muscle aches from statin    Other lipid medications/supplements?:  none    Hypertension Follow-up      Outpatient blood pressures are not being checked.    Low Salt Diet: low salt    BP Readings from Last 2 Encounters:   05/10/18 132/82   02/19/18 134/86     Hemoglobin A1C (%)   Date Value   05/10/2018 7.0 (H)   02/19/2018 7.4 (H)     LDL Cholesterol Calculated (mg/dL)   Date Value   01/31/2018 38   12/01/2016 107 (H)       Amount of exercise or physical activity: None    Problems taking medications regularly: No    Medication side effects: none    Diet: regular (no restrictions) and low salt      Past medical, family, and social histories, medications, and allergies are reviewed and updated in Epic.     ROS:  CONSTITUTIONAL: NEGATIVE for fever, chills, change in weight  ENT/MOUTH: NEGATIVE for ear, mouth and throat problems  RESP: NEGATIVE for significant cough or SOB  CV: NEGATIVE for chest pain, palpitations or peripheral edema  : She received antibiotics at an e-visit last week for a UTI, and her symptoms improved initially but now her symptoms are returning - she has urinary frequency and feels she is not emptying her bladder completely. She has not finished her course of cephalexin. She gets yeast infections when she takes antibiotics.  NEURO: She mentions that her pharmacy sent her a message stating that her ropinerole had no more refills and she needed to come into clinic to resolve this. She takes this  "every day for RLS and it works better than gabapentin. However, gabapentin does have some effect on her symptoms so she continues to take it. She takes gabapentin at bedtime and the ropinerole in the evening (or earlier if she is symptomatic).  ROS otherwise negative    This document serves as a record of the services and decisions personally performed and made by Dr. Newman. It was created on his behalf by Mally Mckay, a trained medical scribe. The creation of this document is based the provider's statements to the medical scribe.  Mally Mckay May 10, 2018 2:25 PM     OBJECTIVE:                                                    /82  Pulse 82  Temp 98.6  F (37  C) (Oral)  Ht 1.753 m (5' 9\")  Wt (!) 166.5 kg (367 lb)  SpO2 97%  BMI 54.2 kg/m2   Body mass index is 54.2 kg/(m^2).     GENERAL: healthy, alert and no distress  EYES: Eyes grossly normal to inspection, PERRL, EOMI, sclerae white and conjunctivae normal  MS: no gross musculoskeletal defects noted, no edema  SKIN: no suspicious lesions or rashes  NEURO: Normal strength and tone, sensory exam grossly normal, mentation intact, oriented times 3 and cranial nerves 2-12 intact  PSYCH: mentation appears normal, affect normal/bright     Diagnostic Test Results:  Results for orders placed or performed in visit on 05/10/18 (from the past 24 hour(s))   Hemoglobin A1c   Result Value Ref Range    Hemoglobin A1C 7.0 (H) 0 - 5.6 %   UA with Microscopic reflex to Culture   Result Value Ref Range    Color Urine Yellow     Appearance Urine Slightly Cloudy     Glucose Urine >=1000 (A) NEG^Negative mg/dL    Bilirubin Urine Negative NEG^Negative    Ketones Urine Negative NEG^Negative mg/dL    Specific Gravity Urine 1.015 1.003 - 1.035    pH Urine 5.5 5.0 - 7.0 pH    Protein Albumin Urine Negative NEG^Negative mg/dL    Urobilinogen Urine 0.2 0.2 - 1.0 EU/dL    Nitrite Urine Negative NEG^Negative    Blood Urine Trace (A) NEG^Negative    Leukocyte Esterase " Urine Negative NEG^Negative    Source Midstream Urine     WBC Urine 5-10 (A) OTO5^0 - 5 /HPF    RBC Urine O - 2 OTO2^O - 2 /HPF    Squamous Epithelial /LPF Urine Few FEW^Few /LPF    Bacteria Urine Few (A) NEG^Negative /HPF     Lab Results   Component Value Date    A1C 7.0 05/10/2018    A1C 7.4 02/19/2018    A1C 7.2 01/31/2018    A1C 7.2 06/14/2017    A1C 8.1 12/01/2016        ASSESSMENT/PLAN:                                                      (E11.65) Type 2 diabetes mellitus with hyperglycemia, without long-term current use of insulin (H)  (primary encounter diagnosis)  Comment: Improved but not quite at the goal of less than 7.0%.   Plan: HgbA1c graph provided. Continue current regimen. Recheck in early-mid July.    (R35.0) Urinary frequency  Comment: Suspecting incompletely or unsuccessfully treated UTI.   Plan: UA with Microscopic reflex to Culture, Urine         Culture Aerobic Bacterial, fluconazole         (DIFLUCAN) 150 MG tablet, nitroFURantoin,         macrocrystal-monohydrate, (MACROBID) 100 MG         capsule        DC Keflex. Follow up as needed.     (I10) Essential hypertension with goal blood pressure less than 140/90  Comment: Well controlled.  Plan: Follow up in July.    (G25.81) Restless legs syndrome (RLS)  Comment: Symptoms stable on current regimen, mainly from ropinerole.  Plan: Follow up in July.    The information in this document, created by the medical scribe for me, accurately reflects the services I personally performed and the decisions made by me. I have reviewed and approved this document for accuracy prior to leaving the patient care area. May 10, 2018 2:25 PM     Renan Newman MD

## 2018-05-10 NOTE — MR AVS SNAPSHOT
After Visit Summary   5/10/2018    Antonietta Christie    MRN: [id number]           Patient Information     Date Of Birth          1970        Visit Information        Provider Department      5/10/2018 1:20 PM Renan Newman MD [facility]        Today's Diagnoses     Type 2 diabetes mellitus with hyperglycemia, without long-term current use of insulin (H)    -  1    Urinary frequency        Essential hypertension with goal blood pressure less than 140/90        Restless legs syndrome (RLS)          Care Instructions    At [facility], we strive to deliver an exceptional experience to you, every time we see you.  If you receive a survey in the mail, please send us back your thoughts. We really do value your feedback.    Based on your medical history, these are the current health maintenance/preventive care services that you are due for (some may have been done at this visit.)  Health Maintenance Due   Topic Date Due     EYE EXAM Q1 YEAR  02/25/1970     HIV SCREEN (SYSTEM ASSIGNED)  02/25/1987     PAP SCREENING Q3 YR (SYSTEM ASSIGNED)  08/01/2016       Suggested websites for health information:  Www.emaze.[employer] : Up to date and easily searchable information on multiple topics.  Www.medlineplus.gov : medication info, interactive tutorials, watch real surgeries online  Www.familydoctor.org : good info from the Academy of Family Physicians  Www.cdc.gov : public health info, travel advisories, epidemics (H1N1)  Www.aap.org : children's health info, normal development, vaccinations  Www.health.state.[place] : [place] dept of health, public health issues in [place], N1N1    Your care team:                            Family Medicine Internal Medicine   MD Amarjit Ortega MD Shantel Branch-Fleming, MD Katya Georgiev PA-C Megan Hill, APRN STEVE Osorio MD Pediatrics   Izaiah Enriquez, KALI Silva, MD Bev Del Rio APRN STEVE Higginbotham  "MD Kayla Mack MD Deborah Mielke, MD Kim Thein, APRN [facility]      Clinic hours: Monday - Thursday 7 am-7 pm; Fridays 7 am-5 pm.   Urgent care: Monday - Friday 11 am-9 pm; Saturday and Sunday 9 am-5 pm.  Pharmacy : Monday -Thursday 8 am-8 pm; Friday 8 am-6 pm; Saturday and Sunday 9 am-5 pm.     Clinic: [phone]   Pharmacy: [phone]              Follow-ups after your visit        Follow-up notes from your care team     Return in about 8 weeks (around 7/3/2018) for diabetes.      Who to contact     If you have questions or need follow up information about today's clinic visit or your schedule please contact [facility] directly at [phone].  Normal or non-critical lab and imaging results will be communicated to you by MyChart, letter or phone within 4 business days after the clinic has received the results. If you do not hear from us within 7 days, please contact the clinic through Epticahart or phone. If you have a critical or abnormal lab result, we will notify you by phone as soon as possible.  Submit refill requests through Sonavation or call your pharmacy and they will forward the refill request to us. Please allow 3 business days for your refill to be completed.          Additional Information About Your Visit        EpticaharPetcube Information     Sonavation gives you secure access to your electronic health record. If you see a primary care provider, you can also send messages to your care team and make appointments. If you have questions, please call your primary care clinic.  If you do not have a primary care provider, please call [phone] and they will assist you.        Care EveryWhere ID     This is your Care EveryWhere ID. This could be used by other organizations to access your [place] medical records  [id number]        Your Vitals Were     Pulse Temperature Height Pulse Oximetry BMI (Body Mass Index)       82 98.6  F (37  C) (Oral) 5' 9\" (1.753 m) 97% 54.2 kg/m2     "    Blood Pressure from Last 3 Encounters:   05/10/18 132/82   02/19/18 134/86   01/31/18 150/88    Weight from Last 3 Encounters:   05/10/18 (!) 367 lb (166.5 kg)   02/19/18 (!) 375 lb (170.1 kg)   01/31/18 (!) 373 lb (169.2 kg)              We Performed the Following     Hemoglobin A1c     UA with Microscopic reflex to Culture     Urine Culture Aerobic Bacterial          Today's Medication Changes          These changes are accurate as of 5/10/18  2:43 PM.  If you have any questions, ask your nurse or doctor.               Start taking these medicines.        Dose/Directions    fluconazole 150 MG tablet   Commonly known as:  DIFLUCAN   Used for:  Urinary frequency   Started by:  Renan Newman MD        Dose:  150 mg   Take 1 tablet (150 mg) by mouth every 3 days for 2 doses   Quantity:  2 tablet   Refills:  0       nitroFURantoin (macrocrystal-monohydrate) 100 MG capsule   Commonly known as:  MACROBID   Used for:  Urinary frequency   Started by:  Renan Newman MD        Dose:  100 mg   Take 1 capsule (100 mg) by mouth 2 times daily for 7 days for urinary infection.   Quantity:  14 capsule   Refills:  0         These medicines have changed or have updated prescriptions.        Dose/Directions    gabapentin 300 MG capsule   Commonly known as:  NEURONTIN   This may have changed:    - how much to take  - how to take this  - when to take this  - additional instructions   Used for:  Restless legs syndrome (RLS)        Take one capsule in the morning and 2 at night by mouth   Quantity:  270 capsule   Refills:  3         Stop taking these medicines if you haven't already. Please contact your care team if you have questions.     cephalexin 500 MG tablet   Stopped by:  Renan Newman MD           lorcaserin 10 MG tablet   Commonly known as:  BELVIQ   Stopped by:  Renan Newman MD                Where to get your medicines      These medications were sent to [facility] [address] [place] [facility] [address]  [facility]  [address] [facility] [address]     Phone:  [phone]     fluconazole 150 MG tablet    nitroFURantoin (macrocrystal-monohydrate) 100 MG capsule                Primary Care Provider Office Phone # Fax #    Aster Smithgalen [place] [phone] [address]  [facility] [address]        Equal Access to Services     SHAWN GARCIA : Hadii aad ku hadasho Soomaali, waaxda luqadaha, qaybta kaalmada adeegyada, waxay idiin hayaan adeeg kharash la'aan . So [facility] [phone].    ATENCIÓN: Si habla español, tiene a fischer disposición servicios gratuitos de asistencia lingüística. Llame al [phone].    We comply with applicable federal civil rights laws and [place] laws. We do not discriminate on the basis of race, color, national origin, age, disability, sex, sexual orientation, or gender identity.            Thank you!     Thank you for choosing [facility]  for your care. Our goal is always to provide you with excellent care. Hearing back from our patients is one way we can continue to improve our services. Please take a few minutes to complete the written survey that you may receive in the mail after your visit with us. Thank you!             Your Updated Medication List - Protect others around you: Learn how to safely use, store and throw away your medicines at www.disposemymeds.org.          This list is accurate as of 5/10/18  2:43 PM.  Always use your most recent med list.                   Brand Name Dispense Instructions for use Diagnosis    aspirin 81 MG EC tablet     30 tablet    TAKE 1 TABLET BY MOUTH DAILY    Type 2 diabetes mellitus with hyperglycemia (H)       atorvastatin 10 MG tablet    LIPITOR    90 tablet    Take 1 tablet (10 mg) by mouth At Bedtime    Type 2 diabetes mellitus with hyperglycemia, without long-term current use of insulin (H)       dapagliflozin 10 MG Tabs tablet    FARXIGA    90 tablet    Take 1  tablet (10 mg) by mouth every morning    Type 2 diabetes mellitus with hyperglycemia, without long-term current use of insulin (H)       fluconazole 150 MG tablet    DIFLUCAN    2 tablet    Take 1 tablet (150 mg) by mouth every 3 days for 2 doses    Urinary frequency       gabapentin 300 MG capsule    NEURONTIN    270 capsule    Take one capsule in the morning and 2 at night by mouth    Restless legs syndrome (RLS)       losartan 100 MG tablet    COZAAR    90 tablet    Take 1 tablet (100 mg) by mouth daily    Hypertension goal BP (blood pressure) < 140/90       nitroFURantoin (macrocrystal-monohydrate) 100 MG capsule    MACROBID    14 capsule    Take 1 capsule (100 mg) by mouth 2 times daily for 7 days for urinary infection.    Urinary frequency       omeprazole 20 MG CR capsule    priLOSEC    90 capsule    TAKE 1 TABLET (20 MG) BY MOUTH DAILY TAKE 30-60 MINUTES BEFORE A MEAL.    Gastroesophageal reflux disease       order for DME      Resmed S9 Auto CPAP 8-12cm H2O, [place] Fx for her small nasal mask        rOPINIRole 5 MG tablet    REQUIP    90 tablet    TAKE ONE TABLET BY MOUTH AT BEDTIME AS NEEDED FOR RESTLESS LEGS    Restless legs syndrome (RLS)

## 2018-05-11 LAB
BACTERIA SPEC CULT: NORMAL
SPECIMEN SOURCE: NORMAL

## 2018-07-29 DIAGNOSIS — K21.9 GASTROESOPHAGEAL REFLUX DISEASE: ICD-10-CM

## 2018-07-29 DIAGNOSIS — I10 HYPERTENSION GOAL BP (BLOOD PRESSURE) < 140/90: Chronic | ICD-10-CM

## 2018-07-29 NOTE — TELEPHONE ENCOUNTER
"Requested Prescriptions   Pending Prescriptions Disp Refills     losartan (COZAAR) 100 MG tablet [Pharmacy Med Name: LOSARTAN POTASSIUM 100 MG TAB] 90 tablet 1    Last Written Prescription Date:  1/31/18  Last Fill Quantity: 90,  # refills: 1   Last Office Visit with [facility], [facility] or [facility] prescribing provider:  5/10/2018     Future Office Visit:      Sig: TAKE 1 TABLET (100 MG) BY MOUTH DAILY    Angiotensin-II Receptors Passed    7/29/2018  1:45 AM       Passed - Blood pressure under 140/90 in past 12 months    BP Readings from Last 3 Encounters:   05/10/18 132/82   02/19/18 134/86   01/31/18 150/88                Passed - Recent (12 mo) or future (30 days) visit within the authorizing provider's specialty    Patient had office visit in the last 12 months or has a visit in the next 30 days with authorizing provider or within the authorizing provider's specialty.  See \"Patient Info\" tab in inbasket, or \"Choose Columns\" in Meds & Orders section of the refill encounter.           Passed - Patient is age 18 or older       Passed - No active pregnancy on record       Passed - Normal serum creatinine on file in past 12 months    Recent Labs   Lab Test  01/31/18   1651   CR  0.71            Passed - Normal serum potassium on file in past 12 months    Recent Labs   Lab Test  01/31/18   1651   POTASSIUM  4.2                   Passed - No positive pregnancy test in past 12 months          "

## 2018-07-29 NOTE — TELEPHONE ENCOUNTER
"Requested Prescriptions   Pending Prescriptions Disp Refills     omeprazole (PRILOSEC) 20 MG CR capsule [Pharmacy Med Name: OMEPRAZOLE DR 20 MG CAPSULE] 90 capsule 1    Last Written Prescription Date:  11/29/17  Last Fill Quantity: 90,  # refills: 1   Last Office Visit with [facility], [facility] or [facility] prescribing provider:  5/10/2018     Future Office Visit:      Sig: TAKE 1 CAPSULE BY MOUTH DAILY TAKE 30-60 MINUTES BEFORE A MEAL.    PPI Protocol Passed    7/29/2018  1:45 AM       Passed - Not on Clopidogrel (unless Pantoprazole ordered)       Passed - No diagnosis of osteoporosis on record       Passed - Recent (12 mo) or future (30 days) visit within the authorizing provider's specialty    Patient had office visit in the last 12 months or has a visit in the next 30 days with authorizing provider or within the authorizing provider's specialty.  See \"Patient Info\" tab in inbasket, or \"Choose Columns\" in Meds & Orders section of the refill encounter.           Passed - Patient is age 18 or older       Passed - No active pregnacy on record       Passed - No positive pregnancy test in past 12 months          "

## 2018-08-01 RX ORDER — LOSARTAN POTASSIUM 100 MG/1
TABLET ORAL
Qty: 90 TABLET | Refills: 0 | Status: SHIPPED | OUTPATIENT
Start: 2018-08-01 | End: 2018-11-02

## 2018-08-01 NOTE — TELEPHONE ENCOUNTER
Prescription approved per [facility] [place].    Parul Serrano RN, BSN

## 2018-08-01 NOTE — TELEPHONE ENCOUNTER
Prescription approved per [facility] [place].    Sara Yu RN  [facility]

## 2018-10-29 DIAGNOSIS — G25.81 RESTLESS LEGS SYNDROME (RLS): ICD-10-CM

## 2018-10-29 RX ORDER — GABAPENTIN 300 MG/1
CAPSULE ORAL
Qty: 270 CAPSULE | Refills: 3 | Status: SHIPPED | OUTPATIENT
Start: 2018-10-29 | End: 2019-01-25

## 2018-10-29 NOTE — TELEPHONE ENCOUNTER
Requested Prescriptions   Pending Prescriptions Disp Refills     gabapentin (NEURONTIN) 300 MG capsule        Last Written Prescription Date:  12/09/16  Last Fill Quantity: 270,   # refills: 3  Last Office Visit: 5/10/18oSni  Future Office visit:       Routing refill request to provider for review/approval because:  Drug not on the [facility], [facility] or [facility] refill protocol or controlled substance 270 capsule 3     Sig: Take one capsule in the morning and 2 at night by mouth    There is no refill protocol information for this order

## 2018-10-29 NOTE — TELEPHONE ENCOUNTER
Routing refill request to provider for review/approval because:  Drug not on the [facility] refill protocol   Corine Escamilla RN

## 2018-10-31 DIAGNOSIS — E11.65 TYPE 2 DIABETES MELLITUS WITH HYPERGLYCEMIA, WITHOUT LONG-TERM CURRENT USE OF INSULIN (H): ICD-10-CM

## 2018-10-31 NOTE — TELEPHONE ENCOUNTER
"Requested Prescriptions   Pending Prescriptions Disp Refills     atorvastatin (LIPITOR) 10 MG tablet [Pharmacy Med Name: ATORVASTATIN 10 MG TABLET]  Last Written Prescription Date:  01/31/18  Last Fill Quantity: 90,  # refills: 1   Last Office Visit with [facility], [facility] or [facility] prescribing provider:  05/10/18Soni   Future Office Visit:    90 tablet 1     Sig: TAKE 1 TABLET (10 MG) BY MOUTH AT BEDTIME    Statins Protocol Passed    10/31/2018  2:21 AM       Passed - LDL on file in past 12 months    Recent Labs   Lab Test  01/31/18   1651   LDL  38            Passed - No abnormal creatine kinase in past 12 months    No lab results found.            Passed - Recent (12 mo) or future (30 days) visit within the authorizing provider's specialty    Patient had office visit in the last 12 months or has a visit in the next 30 days with authorizing provider or within the authorizing provider's specialty.  See \"Patient Info\" tab in inbasket, or \"Choose Columns\" in Meds & Orders section of the refill encounter.             Passed - Patient is age 18 or older       Passed - No active pregnancy on record       Passed - No positive pregnancy test in past 12 months          "

## 2018-11-01 RX ORDER — ATORVASTATIN CALCIUM 10 MG/1
TABLET, FILM COATED ORAL
Qty: 90 TABLET | Refills: 1 | Status: SHIPPED | OUTPATIENT
Start: 2018-11-01 | End: 2019-01-25

## 2018-11-01 NOTE — TELEPHONE ENCOUNTER
Routing refill request to provider for review/approval because:  Associated diagnosis is not on the [facility] Refill Protocol.     Kourtney Juarez RN

## 2018-11-02 DIAGNOSIS — I10 HYPERTENSION GOAL BP (BLOOD PRESSURE) < 140/90: Chronic | ICD-10-CM

## 2018-11-02 RX ORDER — LOSARTAN POTASSIUM 100 MG/1
TABLET ORAL
Qty: 90 TABLET | Refills: 0 | Status: SHIPPED | OUTPATIENT
Start: 2018-11-02 | End: 2018-12-26

## 2018-11-02 NOTE — TELEPHONE ENCOUNTER
"Requested Prescriptions   Pending Prescriptions Disp Refills     losartan (COZAAR) 100 MG tablet [Pharmacy Med Name: LOSARTAN POTASSIUM 100 MG TAB]  Last Written Prescription Date:  8/1/18  Last Fill Quantity: 90,  # refills: 0   Last office visit: 5/10/2018 with prescribing provider:  Paty   Future Office Visit:     90 tablet 0     Sig: TAKE 1 TABLET BY MOUTH EVERY DAY    Angiotensin-II Receptors Passed    11/2/2018  1:46 AM       Passed - Blood pressure under 140/90 in past 12 months    BP Readings from Last 3 Encounters:   05/10/18 132/82   02/19/18 134/86   01/31/18 150/88                Passed - Recent (12 mo) or future (30 days) visit within the authorizing provider's specialty    Patient had office visit in the last 12 months or has a visit in the next 30 days with authorizing provider or within the authorizing provider's specialty.  See \"Patient Info\" tab in inbasket, or \"Choose Columns\" in Meds & Orders section of the refill encounter.             Passed - Patient is age 18 or older       Passed - No active pregnancy on record       Passed - Normal serum creatinine on file in past 12 months    Recent Labs   Lab Test  01/31/18   1651   CR  0.71            Passed - Normal serum potassium on file in past 12 months    Recent Labs   Lab Test  01/31/18   1651   POTASSIUM  4.2                   Passed - No positive pregnancy test in past 12 months          "

## 2018-11-02 NOTE — TELEPHONE ENCOUNTER
Prescription approved per [facility] Refill Protocol.  Hilary Sood RN

## 2018-11-20 DIAGNOSIS — E11.65 TYPE 2 DIABETES MELLITUS WITH HYPERGLYCEMIA, WITHOUT LONG-TERM CURRENT USE OF INSULIN (H): Primary | ICD-10-CM

## 2018-11-20 NOTE — TELEPHONE ENCOUNTER
"REQUESTING 90 DAY SUPPLY.    Requested Prescriptions   Pending Prescriptions Disp Refills     aspirin 81 MG EC tablet  Last Written Prescription Date:  02/27/18  Last Fill Quantity: 30,  # refills: 4   Last Office Visit with [facility], [facility] or [facility] prescribing provider:  05/10/18Soni   Future Office Visit:    30 tablet 4     Sig: Take 1 tablet (81 mg) by mouth daily    Analgesics (Non-Narcotic Tylenol and ASA Only) Passed    11/20/2018  9:23 AM       Passed - Recent (12 mo) or future (30 days) visit within the authorizing provider's specialty    Patient had office visit in the last 12 months or has a visit in the next 30 days with authorizing provider or within the authorizing provider's specialty.  See \"Patient Info\" tab in inbasket, or \"Choose Columns\" in Meds & Orders section of the refill encounter.             Passed - Patient is age 20 years or older    If ASA is flagged for ages under 20 years old. Forward to provider for confirmation Ryes Syndrome is not a concern.                "

## 2018-11-23 NOTE — TELEPHONE ENCOUNTER
Prescription approved per [facility] Refill Protocol.        Parul Serrano RN, BSN

## 2018-11-25 DIAGNOSIS — K21.9 GASTROESOPHAGEAL REFLUX DISEASE: ICD-10-CM

## 2018-11-25 NOTE — TELEPHONE ENCOUNTER
"Requested Prescriptions   Pending Prescriptions Disp Refills     omeprazole (PRILOSEC) 20 MG CR capsule    Last Written Prescription Date:  8/1/18  Last Fill Quantity: 90,  # refills: 1   Last Office Visit with [facility], [facility] or [facility] prescribing provider:  5/10/18   Future Office Visit:      90 capsule 1    PPI Protocol Passed    11/25/2018 12:21 PM       Passed - Not on Clopidogrel (unless Pantoprazole ordered)       Passed - No diagnosis of osteoporosis on record       Passed - Recent (12 mo) or future (30 days) visit within the authorizing provider's specialty    Patient had office visit in the last 12 months or has a visit in the next 30 days with authorizing provider or within the authorizing provider's specialty.  See \"Patient Info\" tab in inbasket, or \"Choose Columns\" in Meds & Orders section of the refill encounter.             Passed - Patient is age 18 or older       Passed - No active pregnacy on record       Passed - No positive pregnancy test in past 12 months              Jayson Faarax  [facility]  "

## 2018-11-27 NOTE — TELEPHONE ENCOUNTER
90 day supply with 1 refills sent on 8/1/18. Should have refills on file at pharmacy. Too soon to refill.         Parul Serrano RN, BSN

## 2018-12-26 DIAGNOSIS — I10 HYPERTENSION GOAL BP (BLOOD PRESSURE) < 140/90: Chronic | ICD-10-CM

## 2018-12-26 RX ORDER — LOSARTAN POTASSIUM 100 MG/1
100 TABLET ORAL DAILY
Qty: 30 TABLET | Refills: 0 | Status: SHIPPED | OUTPATIENT
Start: 2018-12-26 | End: 2019-01-25

## 2018-12-26 NOTE — TELEPHONE ENCOUNTER
Requested Prescriptions   Pending Prescriptions Disp Refills     losartan (COZAAR) 100 MG tablet 90 tablet 0     Sig: Take 1 tablet (100 mg) by mouth daily    There is no refill protocol information for this order        Last Written Prescription Date:  11/2/18  Last Fill Quantity: 90,  # refills: 0   Last office visit: 5/10/2018 with prescribing provider:  5/10/18   Future Office Visit:

## 2019-01-20 DIAGNOSIS — G25.81 RESTLESS LEGS SYNDROME (RLS): ICD-10-CM

## 2019-01-20 NOTE — TELEPHONE ENCOUNTER
"Requested Prescriptions   Pending Prescriptions Disp Refills     rOPINIRole (REQUIP) 5 MG tablet      Last Written Prescription Date:  5/8/18  Last Fill Quantity: 90,  # refills: 2   Last Office Visit with [facility] or [facility] prescribing provider:  5/10/18   Future Office Visit:      90 tablet 2    Antiparkinson's Agents Protocol Failed - 1/20/2019 12:41 PM       Failed - CBC on record in past 12 months    Recent Labs   Lab Test 08/21/15  1426   WBC 7.7   RBC 4.68   HGB 13.3   HCT 41.1                   Failed - Recent (6 mo) or future (30 days) visit within the authorizing provider's specialty    Patient had office visit in the last 6 months or has a visit in the next 30 days with authorizing provider or within the authorizing provider's specialty.  See \"Patient Info\" tab in inbasket, or \"Choose Columns\" in Meds & Orders section of the refill encounter.           Passed - Blood pressure under 140/90 in past 12 months    BP Readings from Last 3 Encounters:   05/10/18 132/82   02/19/18 134/86   01/31/18 150/88                Passed - ALT on record in past 12 months        Recent Labs   Lab Test 01/31/18  1651   ALT 64*            Passed - Serum Creatinine on file in past 12 months    Recent Labs   Lab Test 01/31/18  1651   CR 0.71            Passed - Medication is active on med list       Passed - Patient is age 18 or older       Passed - No active pregnancy on record       Passed - No positive pregnancy test in the past 12 months              Jayson Faarax  [facility]  "

## 2019-01-23 RX ORDER — ROPINIROLE 5 MG/1
TABLET, FILM COATED ORAL
Qty: 90 TABLET | Refills: 2 | OUTPATIENT
Start: 2019-01-23

## 2019-01-23 NOTE — TELEPHONE ENCOUNTER
Routing refill request to provider for review/approval because:  Labs not current:  CBC  Patient needs to be seen because:  Over due for 6 month recheck      Parul Serrano RN, BSN

## 2019-01-24 NOTE — TELEPHONE ENCOUNTER
Due for visit, has been over 6 months since last visit for diabetes.  Anahy Mack MD MPH  Covering for Dr. Oliva

## 2019-01-24 NOTE — TELEPHONE ENCOUNTER
This writer attempted to contact Patient on 01/24/19    Reason for call Unable to refill medication, needs an office visit and left message.    If patient calls back:   Schedule Office Visit appointment with PCP, postponing message.    Aisha Xavier MA

## 2019-01-25 ENCOUNTER — OFFICE VISIT (OUTPATIENT)
Dept: FAMILY MEDICINE | Facility: CLINIC | Age: 49
End: 2019-01-25
Payer: COMMERCIAL

## 2019-01-25 VITALS
SYSTOLIC BLOOD PRESSURE: 138 MMHG | HEIGHT: 69 IN | HEART RATE: 98 BPM | DIASTOLIC BLOOD PRESSURE: 80 MMHG | OXYGEN SATURATION: 96 % | RESPIRATION RATE: 18 BRPM | WEIGHT: 293 LBS | BODY MASS INDEX: 43.4 KG/M2 | TEMPERATURE: 98.9 F

## 2019-01-25 DIAGNOSIS — E11.65 TYPE 2 DIABETES MELLITUS WITH HYPERGLYCEMIA, WITHOUT LONG-TERM CURRENT USE OF INSULIN (H): Primary | ICD-10-CM

## 2019-01-25 DIAGNOSIS — E66.01 MORBID OBESITY WITH BMI OF 50.0-59.9, ADULT (H): ICD-10-CM

## 2019-01-25 DIAGNOSIS — E78.2 MIXED HYPERLIPIDEMIA: ICD-10-CM

## 2019-01-25 DIAGNOSIS — Z13.89 SCREENING FOR DIABETIC PERIPHERAL NEUROPATHY: ICD-10-CM

## 2019-01-25 DIAGNOSIS — G25.81 RESTLESS LEGS SYNDROME (RLS): ICD-10-CM

## 2019-01-25 DIAGNOSIS — K21.9 GASTROESOPHAGEAL REFLUX DISEASE, ESOPHAGITIS PRESENCE NOT SPECIFIED: ICD-10-CM

## 2019-01-25 DIAGNOSIS — L30.4 CHAFING: ICD-10-CM

## 2019-01-25 DIAGNOSIS — I10 HYPERTENSION GOAL BP (BLOOD PRESSURE) < 140/90: Chronic | ICD-10-CM

## 2019-01-25 LAB
ANION GAP SERPL CALCULATED.3IONS-SCNC: 6 MMOL/L (ref 3–14)
BUN SERPL-MCNC: 12 MG/DL (ref 7–30)
CALCIUM SERPL-MCNC: 9.1 MG/DL (ref 8.5–10.1)
CHLORIDE SERPL-SCNC: 105 MMOL/L (ref 94–109)
CHOLEST SERPL-MCNC: 129 MG/DL
CO2 SERPL-SCNC: 28 MMOL/L (ref 20–32)
CREAT SERPL-MCNC: 0.76 MG/DL (ref 0.52–1.04)
CREAT UR-MCNC: 99 MG/DL
GFR SERPL CREATININE-BSD FRML MDRD: >90 ML/MIN/{1.73_M2}
GLUCOSE SERPL-MCNC: 136 MG/DL (ref 70–99)
HBA1C MFR BLD: 6.5 % (ref 0–5.6)
HDLC SERPL-MCNC: 42 MG/DL
LDLC SERPL CALC-MCNC: 60 MG/DL
MICROALBUMIN UR-MCNC: 25 MG/L
MICROALBUMIN/CREAT UR: 25.33 MG/G CR (ref 0–25)
NONHDLC SERPL-MCNC: 87 MG/DL
POTASSIUM SERPL-SCNC: 4.1 MMOL/L (ref 3.4–5.3)
SODIUM SERPL-SCNC: 139 MMOL/L (ref 133–144)
TRIGL SERPL-MCNC: 133 MG/DL

## 2019-01-25 PROCEDURE — 83036 HEMOGLOBIN GLYCOSYLATED A1C: CPT | Performed by: PHYSICIAN ASSISTANT

## 2019-01-25 PROCEDURE — 80061 LIPID PANEL: CPT | Performed by: PHYSICIAN ASSISTANT

## 2019-01-25 PROCEDURE — 99207 C FOOT EXAM  NO CHARGE: CPT | Performed by: PHYSICIAN ASSISTANT

## 2019-01-25 PROCEDURE — 36415 COLL VENOUS BLD VENIPUNCTURE: CPT | Performed by: PHYSICIAN ASSISTANT

## 2019-01-25 PROCEDURE — 82043 UR ALBUMIN QUANTITATIVE: CPT | Performed by: PHYSICIAN ASSISTANT

## 2019-01-25 PROCEDURE — 99214 OFFICE O/P EST MOD 30 MIN: CPT | Performed by: PHYSICIAN ASSISTANT

## 2019-01-25 PROCEDURE — 80048 BASIC METABOLIC PNL TOTAL CA: CPT | Performed by: PHYSICIAN ASSISTANT

## 2019-01-25 RX ORDER — ATORVASTATIN CALCIUM 10 MG/1
10 TABLET, FILM COATED ORAL DAILY
Qty: 90 TABLET | Refills: 3 | Status: SHIPPED | OUTPATIENT
Start: 2019-01-25 | End: 2020-02-10

## 2019-01-25 RX ORDER — GABAPENTIN 300 MG/1
CAPSULE ORAL
Qty: 270 CAPSULE | Refills: 3 | Status: SHIPPED | OUTPATIENT
Start: 2019-01-25 | End: 2020-01-15

## 2019-01-25 RX ORDER — ROPINIROLE 5 MG/1
TABLET, FILM COATED ORAL
Qty: 90 TABLET | Refills: 0 | Status: CANCELLED | OUTPATIENT
Start: 2019-01-25

## 2019-01-25 RX ORDER — ROPINIROLE 5 MG/1
5 TABLET, FILM COATED ORAL AT BEDTIME
Qty: 90 TABLET | Refills: 3 | Status: SHIPPED | OUTPATIENT
Start: 2019-01-25 | End: 2020-01-10

## 2019-01-25 RX ORDER — LOSARTAN POTASSIUM 100 MG/1
100 TABLET ORAL DAILY
Qty: 90 TABLET | Refills: 1 | Status: SHIPPED | OUTPATIENT
Start: 2019-01-25 | End: 2019-11-04

## 2019-01-25 RX ORDER — DAPAGLIFLOZIN 10 MG/1
10 TABLET, FILM COATED ORAL EVERY MORNING
Qty: 90 TABLET | Refills: 1 | Status: SHIPPED | OUTPATIENT
Start: 2019-01-25 | End: 2019-09-11

## 2019-01-25 ASSESSMENT — MIFFLIN-ST. JEOR: SCORE: 2314.16

## 2019-01-25 ASSESSMENT — PAIN SCALES - GENERAL: PAINLEVEL: NO PAIN (0)

## 2019-01-25 NOTE — Clinical Note
Please abstract the following data from this visit with this patient into the appropriate field in Epic:Pap smear done on this date: 1/25/2018 (approximately), by this group: Tre, results were NIL, negative HPV, patient was told repeat in 5 years.

## 2019-01-25 NOTE — TELEPHONE ENCOUNTER
"Requested Prescriptions   Pending Prescriptions Disp Refills     rOPINIRole (REQUIP) 5 MG tablet [Pharmacy Med Name: ROPINIROLE HCL 5 MG TABLET] 90 tablet 0      Last Written Prescription Date:  05/08/18  Last Fill Quantity: 90,  # refills: 2   Last Office Visit with [facility], [facility] or [facility] prescribing provider:  05/10/18Soni   Future Office Visit:    Next 5 appointments (look out 90 days)    Jan 25, 2019  8:00 AM CST  Office Visit [facility]alie Cuadra [facility]-C  [facility] [address] [place] [phone]  [phone]        Sig: TAKE ONE TABLET BY MOUTH AT BEDTIME AS NEEDED FOR RESTLESS LEGS    Antiparkinson's Agents Protocol Failed - 1/25/2019  1:42 AM       Failed - CBC on record in past 12 months    Recent Labs   Lab Test 08/21/15  1426   WBC 7.7   RBC 4.68   HGB 13.3   HCT 41.1                   Passed - Blood pressure under 140/90 in past 12 months    BP Readings from Last 3 Encounters:   05/10/18 132/82   02/19/18 134/86   01/31/18 150/88                Passed - ALT on record in past 12 months        Recent Labs   Lab Test 01/31/18  1651   ALT 64*            Passed - Serum Creatinine on file in past 12 months    Recent Labs   Lab Test 01/31/18  1651   CR 0.71            Passed - Medication is active on med list       Passed - Patient is age 18 or older       Passed - No active pregnancy on record       Passed - No positive pregnancy test in the past 12 months       Passed - Recent (6 mo) or future (30 days) visit within the authorizing provider's specialty    Patient had office visit in the last 6 months or has a visit in the next 30 days with authorizing provider or within the authorizing provider's specialty.  See \"Patient Info\" tab in inbasket, or \"Choose Columns\" in Meds & Orders section of the refill encounter.              "

## 2019-01-25 NOTE — PROGRESS NOTES
SUBJECTIVE:   Antonietta Christie is a 48 year old female who presents to clinic today for the following health issues:    Diabetes Follow-up      Patient is checking blood sugars: not at all    Diabetic concerns: None     Symptoms of hypoglycemia (low blood sugar): none     Paresthesias (numbness or burning in feet) or sores: No     Date of last diabetic eye exam: December 2018    Diabetes Management Resources    Hyperlipidemia Follow-Up      Rate your low fat/cholesterol diet?: not monitoring fat    Taking statin?  Yes, no muscle aches from statin    Other lipid medications/supplements?:  none    Hypertension Follow-up      Outpatient blood pressures are not being checked.    Low Salt Diet: not monitoring salt    BP Readings from Last 2 Encounters:   01/25/19 138/80   05/10/18 132/82     Hemoglobin A1C (%)   Date Value   01/25/2019 6.5 (H)   05/10/2018 7.0 (H)     LDL Cholesterol Calculated (mg/dL)   Date Value   01/31/2018 38   12/01/2016 107 (H)       Amount of exercise or physical activity: None    Problems taking medications regularly: No    Medication side effects: none    Diet: regular (no restrictions)        Medication Followup of Requip    Taking Medication as prescribed: yes    Side Effects:  None    Medication Helping Symptoms:  yes     Concern: patient reports that  She uses a light daily pad and it causes irritation on skin around vagina. No odor, no discharge, no internal vaginal pain or itching. Patient wants to  Know what cream she can use over the counter.   Problem list and histories reviewed & adjusted, as indicated.  Additional history: as documented    Patient Active Problem List   Diagnosis     GERD (gastroesophageal reflux disease)     CARDIOVASCULAR SCREENING; LDL GOAL LESS THAN 100     Bunion of great toe of left foot     Insomnia     Restless legs syndrome (RLS)     Peripheral edema     Carpal tunnel syndrome     Essential hypertension with goal blood pressure less than 140/90     Seborrheic  keratosis     CHELO (obstructive sleep apnea)- Moderate (AHI 26)     Type 2 diabetes mellitus with hyperglycemia, without long-term current use of insulin (H)     Morbid obesity with BMI of 50.0-59.9, adult (H)     Major depression     Past Surgical History:   Procedure Laterality Date     TONSILLECTOMY & ADENOIDECTOMY  1979       Social History     Tobacco Use     Smoking status: Never Smoker     Smokeless tobacco: Never Used   Substance Use Topics     Alcohol use: Yes     Comment: occasional     Family History   Problem Relation Age of Onset     Family History Negative No family hx of          Current Outpatient Medications   Medication Sig Dispense Refill     aspirin (ASA) 81 MG EC tablet Take 1 tablet (81 mg) by mouth daily 90 tablet 3     atorvastatin (LIPITOR) 10 MG tablet Take 1 tablet (10 mg) by mouth daily 90 tablet 3     dapagliflozin (FARXIGA) 10 MG TABS tablet Take 1 tablet (10 mg) by mouth every morning for diabetes. 90 tablet 1     gabapentin (NEURONTIN) 300 MG capsule Take one capsule in the morning and 2 at night by mouth 270 capsule 3     losartan (COZAAR) 100 MG tablet Take 1 tablet (100 mg) by mouth daily 90 tablet 1     omeprazole (PRILOSEC) 20 MG DR capsule Take 1 capsule (20 mg) by mouth daily 90 capsule 3     ORDER FOR DME Resmed S9 Auto CPAP 8-12cm H2O, [place] [facility] for her small nasal mask       rOPINIRole (REQUIP) 5 MG tablet Take 1 tablet (5 mg) by mouth At Bedtime 90 tablet 3     Allergies   Allergen Reactions     Lisinopril Cough     No Clinical Screening - See Comments Other (See Comments)     Hay fever (fall)--runny nose, sneezing, itchy eyes  Manganese violet dye in make up--red, itchy, mattery eyes       Reviewed and updated as needed this visit by clinical staff  Tobacco  Allergies  Meds  Med Hx  Surg Hx  Fam Hx  Soc Hx      Reviewed and updated as needed this visit by Provider         ROS:  Constitutional, HEENT, cardiovascular, pulmonary, GI, , musculoskeletal, neuro, skin,  "endocrine and psych systems are negative, except as otherwise noted.    OBJECTIVE:     /80 (BP Location: Right arm, Patient Position: Sitting, Cuff Size: Adult Large)   Pulse 98   Temp 98.9  F (37.2  C) (Oral)   Resp 18   Ht 1.753 m (5' 9\")   Wt (!) 162.5 kg (358 lb 3.2 oz)   LMP  (LMP Unknown)   SpO2 96%   Breastfeeding? No   BMI 52.90 kg/m    Body mass index is 52.9 kg/m .  GENERAL: healthy, alert and no distress  EYES: Eyes grossly normal to inspection, PERRL and conjunctivae and sclerae normal  NECK: no adenopathy, no asymmetry, masses, or scars and thyroid normal to palpation  RESP: lungs clear to auscultation - no rales, rhonchi or wheezes  CV: regular rate and rhythm, normal S1 S2, no S3 or S4, no murmur, click or rub, no peripheral edema and peripheral pulses strong  ABDOMEN: soft, nontender, no hepatosplenomegaly, no masses and bowel sounds normal   (female): patient declined today  MS: no gross musculoskeletal defects noted, no edema  FOOT exam: normal sensation on monofilament exam, no ulcers or skin sores    Diagnostic Test Results:  Results for orders placed or performed in visit on 01/25/19 (from the past 24 hour(s))   HEMOGLOBIN A1C   Result Value Ref Range    Hemoglobin A1C 6.5 (H) 0 - 5.6 %       ASSESSMENT/PLAN:       ICD-10-CM    1. Type 2 diabetes mellitus with hyperglycemia, without long-term current use of insulin (H) E11.65 BASIC METABOLIC PANEL     HEMOGLOBIN A1C     Albumin Random Urine Quantitative with Creat Ratio     atorvastatin (LIPITOR) 10 MG tablet     aspirin (ASA) 81 MG EC tablet     dapagliflozin (FARXIGA) 10 MG TABS tablet   2. Hypertension goal BP (blood pressure) < 140/90 I10 losartan (COZAAR) 100 MG tablet   3. Mixed hyperlipidemia E78.2    4. Restless legs syndrome (RLS) G25.81 rOPINIRole (REQUIP) 5 MG tablet     gabapentin (NEURONTIN) 300 MG capsule   5. Gastroesophageal reflux disease, esophagitis presence not specified K21.9 omeprazole (PRILOSEC) 20 MG " capsule   6. Chafing L30.4    7. Morbid obesity with BMI of 50.0-59.9, adult (H) E66.01     Z68.43    8. Screening for diabetic peripheral neuropathy Z13.89 FOOT EXAM  NO CHARGE [14287.077]     1. Stable continue current dose  Follow up in 6 months   2. Stable  Continue current medications  Follow up in 6 months   3. Stable  Continue current medications follow up in 1 year  4. Stable  Continue current medication follow up in 1 year  5. Stable  Continue Omeprazole as needed   6. Patient will use Desitin cream as needed  She has declined exam today, stating she know what its is, if Desitin dies not help, shell follow up for proper vaginal exam       Rosalie Cuadra PA-C  [facility]

## 2019-01-29 ENCOUNTER — MYC MEDICAL ADVICE (OUTPATIENT)
Dept: FAMILY MEDICINE | Facility: CLINIC | Age: 49
End: 2019-01-29

## 2019-02-15 ENCOUNTER — OFFICE VISIT (OUTPATIENT)
Dept: FAMILY MEDICINE | Facility: CLINIC | Age: 49
End: 2019-02-15
Payer: COMMERCIAL

## 2019-02-15 VITALS
HEIGHT: 69 IN | BODY MASS INDEX: 43.4 KG/M2 | SYSTOLIC BLOOD PRESSURE: 144 MMHG | WEIGHT: 293 LBS | HEART RATE: 74 BPM | OXYGEN SATURATION: 99 % | TEMPERATURE: 98.1 F | RESPIRATION RATE: 20 BRPM | DIASTOLIC BLOOD PRESSURE: 72 MMHG

## 2019-02-15 DIAGNOSIS — L98.9 SKIN LESION: Primary | ICD-10-CM

## 2019-02-15 PROCEDURE — 11102 TANGNTL BX SKIN SINGLE LES: CPT | Performed by: FAMILY MEDICINE

## 2019-02-15 PROCEDURE — 88305 TISSUE EXAM BY PATHOLOGIST: CPT | Performed by: FAMILY MEDICINE

## 2019-02-15 ASSESSMENT — MIFFLIN-ST. JEOR: SCORE: 2313.26

## 2019-02-15 NOTE — NURSING NOTE
"Chief Complaint   Patient presents with     Mole     right upper thigh        Initial /72   Pulse 74   Temp 98.1  F (36.7  C) (Oral)   Resp 20   Ht 1.753 m (5' 9\")   Wt (!) 162.4 kg (358 lb)   LMP  (LMP Unknown)   SpO2 99%   BMI 52.87 kg/m   Estimated body mass index is 52.87 kg/m  as calculated from the following:    Height as of this encounter: 1.753 m (5' 9\").    Weight as of this encounter: 162.4 kg (358 lb).    Melissa Wylie, DEEP    "

## 2019-02-15 NOTE — PROGRESS NOTES
"SUBJECTIVE:  Antonietta Christie, a 48 year old female scheduled an appointment to discuss the following issues:  Mole right upper thigh.  No family history skin cancer or personal history of skin cancer.  Mole x5 years. Some bleeding and catching on things.     Past Medical History:   Diagnosis Date     Autoimmune hepatitis (H) 2000s    Hx of     Hypertension goal BP (blood pressure) < 140/90 8/13/2013     Insomnia      Obesity     RX w/ phentiramine & topiramate in 2011     CHELO (obstructive sleep apnea)- Moderate (AHI 26) 9/26/2013    Sleep Study 3/13 (367#)- AHI 26.2, RDI 30.8, Lo2 88%, TcCo2 levels serge from 40-41 to 51. Baseline ABG 7.41/38/91. PLMI 0. CPAP 9 cm effective including REM-supine. Max TcCO2 46, PLMI 1.0.        Peripheral edema 8/1/2012     Restless legs syndrome (RLS)      Type 2 diabetes, HbA1c goal < 7% (H) 7/1/2015       Past Surgical History:   Procedure Laterality Date     TONSILLECTOMY & ADENOIDECTOMY  1979       Family History   Problem Relation Age of Onset     Family History Negative No family hx of        Social History     Tobacco Use     Smoking status: Never Smoker     Smokeless tobacco: Never Used   Substance Use Topics     Alcohol use: Yes     Comment: occasional       ROS:  All other ROS negativve    OBJECTIVE:  /72   Pulse 74   Temp 98.1  F (36.7  C) (Oral)   Resp 20   Ht 1.753 m (5' 9\")   Wt (!) 162.4 kg (358 lb)   LMP  (LMP Unknown)   SpO2 99%   BMI 52.87 kg/m     EXAM:  GENERAL APPEARANCE: healthy, alert and no distress  SKIN: raised white pedunculated lesion on red base about 1 cm diameter.   PSYCH: mentation appears normal and affect normal/bright    Patient gave verbal consent for shave biopsy risks/benefits reviewed.  Shave bx in typical fashion .  Area cleaned with betadine and anesthetized with 1% lidocaine with epi . Then a double edged blade used to remove an area of her lesion and sent to pathology.  Bleeding was cauterized with AgNO3. Pt tolerated procedure " well.  Good hemostasis and bandage/bacitracin applied.  Aftercare and warning signs discussed.  Continue self-mole checks.  ASSESSMENT / PLAN:  (L98.9) Skin lesion  (primary encounter diagnosis)  Comment: irritated SK vs wart vs squamous. Growing and patient wanted removed.   Plan: Surgical pathology exam, BIOPSY SKIN/SUBQ/MUC         MEM, EACH ADDTL LESION        Aftercare discussed. Await path. Expected course and warning signs reviewed. Call/email with questions/concerns. Dermatology follow-up if cancerous or grows back. Monitor for bleeding/infection.     Harry Leiva

## 2019-02-19 LAB — COPATH REPORT: NORMAL

## 2019-08-13 ENCOUNTER — TRANSFERRED RECORDS (OUTPATIENT)
Dept: HEALTH INFORMATION MANAGEMENT | Facility: CLINIC | Age: 49
End: 2019-08-13

## 2019-08-20 ENCOUNTER — TELEPHONE (OUTPATIENT)
Dept: FAMILY MEDICINE | Facility: CLINIC | Age: 49
End: 2019-08-20

## 2019-08-20 NOTE — TELEPHONE ENCOUNTER
Panel Management Review   One phone call and send letter if unable to reach them or [employer] message and send letter if not read after 2 weeks (You will get a message to your inbasket)      BP Readings from Last 1 Encounters:   02/15/19 144/72    ,   Lab Results   Component Value Date    A1C 6.5 01/25/2019    A1C 7.0 05/10/2018   , Visit date not found    Health Maintenance Due   Topic Date Due     PREVENTIVE CARE VISIT  1970     DEPRESSION ACTION PLAN  1970     EYE EXAM  1970     COLONOSCOPY  02/25/1979     HIV SCREENING  02/25/1984     MAMMO SCREENING  09/16/2015     PHQ-9  08/19/2018     A1C  07/25/2019     BMP  07/25/2019     ZOSTER IMMUNIZATION (1 of 2) 02/25/2019        Fail List measure: Colon cancer, diabetes follow up, and depression         Patient is due/failing the following:   A1C, DAP and PHQ9    Action needed:   Patient needs office visit for Diabetes follow up. and Patient needs to do PHQ9.    Type of outreach:    Sent [employer] message.    Questions for provider review:    None                                                                                       Chart routed to n/a Elizabeth Carbajal

## 2019-09-04 ENCOUNTER — TELEPHONE (OUTPATIENT)
Dept: FAMILY MEDICINE | Facility: CLINIC | Age: 49
End: 2019-09-04

## 2019-09-04 NOTE — TELEPHONE ENCOUNTER
Patient is over due for diabetes follow up.  Needs appt and can discussed medication at that time.     No refill or pa at this time.    Aster Oliva M.D.

## 2019-09-04 NOTE — TELEPHONE ENCOUNTER
Plan does not cover omeprazole (PRILOSEC) 20 MG DR capsule.      Additional Information: Ins only allows 90 every 365 days.  Need PA or alternative.  No PA info given from SHILPI Sy

## 2019-09-04 NOTE — TELEPHONE ENCOUNTER
This writer attempted to contact patient on 09/04/19      Reason for call Patient needs an office visit  and left message.      If patient calls back:   Schedule Office Visit appointment within 2 weeks with PCP, postponing message.        Aisha Xavier MA

## 2019-09-06 NOTE — TELEPHONE ENCOUNTER
Patient has an appt scheduled with Christine Paitner for 9/11/19.  Aisha Xavier MA/  For [employer] and Lynette

## 2019-09-10 ASSESSMENT — ENCOUNTER SYMPTOMS
SHORTNESS OF BREATH: 0
HEMATURIA: 0
FEVER: 0
CHILLS: 0
NAUSEA: 0
CONSTIPATION: 0
BREAST MASS: 0
ARTHRALGIAS: 0
PALPITATIONS: 0
DIARRHEA: 0
PARESTHESIAS: 0
DYSURIA: 0
SORE THROAT: 0
FREQUENCY: 1
HEADACHES: 0
COUGH: 0
HEARTBURN: 0
NERVOUS/ANXIOUS: 0
DIZZINESS: 0
WEAKNESS: 0
MYALGIAS: 0
HEMATOCHEZIA: 0
EYE PAIN: 0
ABDOMINAL PAIN: 0
JOINT SWELLING: 0

## 2019-09-11 ENCOUNTER — OFFICE VISIT (OUTPATIENT)
Dept: FAMILY MEDICINE | Facility: CLINIC | Age: 49
End: 2019-09-11
Payer: COMMERCIAL

## 2019-09-11 VITALS
OXYGEN SATURATION: 97 % | BODY MASS INDEX: 43.4 KG/M2 | SYSTOLIC BLOOD PRESSURE: 134 MMHG | DIASTOLIC BLOOD PRESSURE: 77 MMHG | HEART RATE: 85 BPM | HEIGHT: 69 IN | WEIGHT: 293 LBS | TEMPERATURE: 97.8 F

## 2019-09-11 DIAGNOSIS — N89.8 VAGINAL ITCHING: ICD-10-CM

## 2019-09-11 DIAGNOSIS — E66.01 MORBID OBESITY WITH BMI OF 50.0-59.9, ADULT (H): ICD-10-CM

## 2019-09-11 DIAGNOSIS — K21.9 GASTROESOPHAGEAL REFLUX DISEASE, ESOPHAGITIS PRESENCE NOT SPECIFIED: Chronic | ICD-10-CM

## 2019-09-11 DIAGNOSIS — G47.33 OSA (OBSTRUCTIVE SLEEP APNEA): Chronic | ICD-10-CM

## 2019-09-11 DIAGNOSIS — Z12.31 ENCOUNTER FOR SCREENING MAMMOGRAM FOR BREAST CANCER: ICD-10-CM

## 2019-09-11 DIAGNOSIS — Z00.00 ROUTINE HISTORY AND PHYSICAL EXAMINATION OF ADULT: Primary | ICD-10-CM

## 2019-09-11 DIAGNOSIS — G25.81 RESTLESS LEGS SYNDROME (RLS): ICD-10-CM

## 2019-09-11 DIAGNOSIS — E11.65 TYPE 2 DIABETES MELLITUS WITH HYPERGLYCEMIA, WITHOUT LONG-TERM CURRENT USE OF INSULIN (H): ICD-10-CM

## 2019-09-11 DIAGNOSIS — R60.0 BILATERAL LEG EDEMA: ICD-10-CM

## 2019-09-11 DIAGNOSIS — R82.90 NONSPECIFIC FINDING ON EXAMINATION OF URINE: ICD-10-CM

## 2019-09-11 DIAGNOSIS — B37.2 CANDIDAL INTERTRIGO: ICD-10-CM

## 2019-09-11 DIAGNOSIS — B37.31 CANDIDIASIS OF VAGINA: ICD-10-CM

## 2019-09-11 DIAGNOSIS — Z23 NEED FOR TDAP VACCINATION: ICD-10-CM

## 2019-09-11 DIAGNOSIS — Z23 NEED FOR IMMUNIZATION AGAINST INFLUENZA: ICD-10-CM

## 2019-09-11 DIAGNOSIS — F32.0 CURRENT MILD EPISODE OF MAJOR DEPRESSIVE DISORDER, UNSPECIFIED WHETHER RECURRENT (H): ICD-10-CM

## 2019-09-11 DIAGNOSIS — L21.9 SEBORRHEIC DERMATITIS: ICD-10-CM

## 2019-09-11 DIAGNOSIS — N30.00 ACUTE CYSTITIS WITHOUT HEMATURIA: ICD-10-CM

## 2019-09-11 PROBLEM — F32.9 MAJOR DEPRESSION: Status: RESOLVED | Noted: 2017-08-07 | Resolved: 2019-09-11

## 2019-09-11 LAB
ALBUMIN UR-MCNC: NEGATIVE MG/DL
ANION GAP SERPL CALCULATED.3IONS-SCNC: 6 MMOL/L (ref 3–14)
APPEARANCE UR: CLEAR
BACTERIA #/AREA URNS HPF: ABNORMAL /HPF
BILIRUB UR QL STRIP: NEGATIVE
BUN SERPL-MCNC: 14 MG/DL (ref 7–30)
CALCIUM SERPL-MCNC: 9.3 MG/DL (ref 8.5–10.1)
CHLORIDE SERPL-SCNC: 107 MMOL/L (ref 94–109)
CO2 SERPL-SCNC: 27 MMOL/L (ref 20–32)
COLOR UR AUTO: YELLOW
CREAT SERPL-MCNC: 0.77 MG/DL (ref 0.52–1.04)
GFR SERPL CREATININE-BSD FRML MDRD: 90 ML/MIN/{1.73_M2}
GLUCOSE SERPL-MCNC: 119 MG/DL (ref 70–99)
GLUCOSE UR STRIP-MCNC: >=1000 MG/DL
HBA1C MFR BLD: 6.5 % (ref 0–5.6)
HGB UR QL STRIP: NEGATIVE
KETONES UR STRIP-MCNC: NEGATIVE MG/DL
LEUKOCYTE ESTERASE UR QL STRIP: ABNORMAL
NITRATE UR QL: NEGATIVE
NON-SQ EPI CELLS #/AREA URNS LPF: ABNORMAL /LPF
PH UR STRIP: 6 PH (ref 5–7)
POTASSIUM SERPL-SCNC: 4.2 MMOL/L (ref 3.4–5.3)
RBC #/AREA URNS AUTO: ABNORMAL /HPF
SODIUM SERPL-SCNC: 140 MMOL/L (ref 133–144)
SOURCE: ABNORMAL
SP GR UR STRIP: 1.01 (ref 1–1.03)
SPECIMEN SOURCE: ABNORMAL
UROBILINOGEN UR STRIP-ACNC: 0.2 EU/DL (ref 0.2–1)
WBC #/AREA URNS AUTO: ABNORMAL /HPF
WET PREP SPEC: ABNORMAL

## 2019-09-11 PROCEDURE — 83036 HEMOGLOBIN GLYCOSYLATED A1C: CPT | Performed by: NURSE PRACTITIONER

## 2019-09-11 PROCEDURE — 96127 BRIEF EMOTIONAL/BEHAV ASSMT: CPT | Mod: 59 | Performed by: NURSE PRACTITIONER

## 2019-09-11 PROCEDURE — 87210 SMEAR WET MOUNT SALINE/INK: CPT | Performed by: NURSE PRACTITIONER

## 2019-09-11 PROCEDURE — 87186 SC STD MICRODIL/AGAR DIL: CPT | Performed by: NURSE PRACTITIONER

## 2019-09-11 PROCEDURE — 81001 URINALYSIS AUTO W/SCOPE: CPT | Performed by: NURSE PRACTITIONER

## 2019-09-11 PROCEDURE — 90471 IMMUNIZATION ADMIN: CPT | Performed by: NURSE PRACTITIONER

## 2019-09-11 PROCEDURE — 36415 COLL VENOUS BLD VENIPUNCTURE: CPT | Performed by: NURSE PRACTITIONER

## 2019-09-11 PROCEDURE — 90715 TDAP VACCINE 7 YRS/> IM: CPT | Performed by: NURSE PRACTITIONER

## 2019-09-11 PROCEDURE — 99396 PREV VISIT EST AGE 40-64: CPT | Mod: 25 | Performed by: NURSE PRACTITIONER

## 2019-09-11 PROCEDURE — 99213 OFFICE O/P EST LOW 20 MIN: CPT | Mod: 25 | Performed by: NURSE PRACTITIONER

## 2019-09-11 PROCEDURE — 90686 IIV4 VACC NO PRSV 0.5 ML IM: CPT | Performed by: NURSE PRACTITIONER

## 2019-09-11 PROCEDURE — 80048 BASIC METABOLIC PNL TOTAL CA: CPT | Performed by: NURSE PRACTITIONER

## 2019-09-11 PROCEDURE — 87088 URINE BACTERIA CULTURE: CPT | Performed by: NURSE PRACTITIONER

## 2019-09-11 PROCEDURE — 87086 URINE CULTURE/COLONY COUNT: CPT | Performed by: NURSE PRACTITIONER

## 2019-09-11 PROCEDURE — 90472 IMMUNIZATION ADMIN EACH ADD: CPT | Performed by: NURSE PRACTITIONER

## 2019-09-11 RX ORDER — KETOCONAZOLE 20 MG/ML
SHAMPOO TOPICAL DAILY PRN
Qty: 120 ML | Refills: 3 | Status: SHIPPED | OUTPATIENT
Start: 2019-09-11 | End: 2021-12-28

## 2019-09-11 RX ORDER — FLUCONAZOLE 150 MG/1
150 TABLET ORAL ONCE
Qty: 1 TABLET | Refills: 0 | Status: SHIPPED | OUTPATIENT
Start: 2019-09-11 | End: 2019-09-11

## 2019-09-11 RX ORDER — DAPAGLIFLOZIN 10 MG/1
10 TABLET, FILM COATED ORAL EVERY MORNING
Qty: 90 TABLET | Refills: 1 | Status: SHIPPED | OUTPATIENT
Start: 2019-09-11 | End: 2020-01-15

## 2019-09-11 ASSESSMENT — ENCOUNTER SYMPTOMS
SHORTNESS OF BREATH: 0
PALPITATIONS: 0
PARESTHESIAS: 0
HEMATURIA: 0
CHILLS: 0
DIZZINESS: 0
HEARTBURN: 0
DIARRHEA: 0
NERVOUS/ANXIOUS: 0
WEAKNESS: 0
BREAST MASS: 0
MYALGIAS: 0
FEVER: 0
HEMATOCHEZIA: 0
JOINT SWELLING: 0
COUGH: 0
CONSTIPATION: 0
DYSURIA: 0
HEADACHES: 0
EYE PAIN: 0
ARTHRALGIAS: 0
NAUSEA: 0
SORE THROAT: 0
ABDOMINAL PAIN: 0
FREQUENCY: 1

## 2019-09-11 ASSESSMENT — ANXIETY QUESTIONNAIRES
6. BECOMING EASILY ANNOYED OR IRRITABLE: SEVERAL DAYS
1. FEELING NERVOUS, ANXIOUS, OR ON EDGE: NOT AT ALL
IF YOU CHECKED OFF ANY PROBLEMS ON THIS QUESTIONNAIRE, HOW DIFFICULT HAVE THESE PROBLEMS MADE IT FOR YOU TO DO YOUR WORK, TAKE CARE OF THINGS AT HOME, OR GET ALONG WITH OTHER PEOPLE: NOT DIFFICULT AT ALL
5. BEING SO RESTLESS THAT IT IS HARD TO SIT STILL: NOT AT ALL
7. FEELING AFRAID AS IF SOMETHING AWFUL MIGHT HAPPEN: NOT AT ALL
GAD7 TOTAL SCORE: 1
3. WORRYING TOO MUCH ABOUT DIFFERENT THINGS: NOT AT ALL
2. NOT BEING ABLE TO STOP OR CONTROL WORRYING: NOT AT ALL

## 2019-09-11 ASSESSMENT — PATIENT HEALTH QUESTIONNAIRE - PHQ9
SUM OF ALL RESPONSES TO PHQ QUESTIONS 1-9: 5
5. POOR APPETITE OR OVEREATING: NOT AT ALL

## 2019-09-11 ASSESSMENT — MIFFLIN-ST. JEOR: SCORE: 2337.29

## 2019-09-11 NOTE — PATIENT INSTRUCTIONS
At [facility], we strive to deliver an exceptional experience to you, every time we see you.  If you receive a survey in the mail, please send us back your thoughts. We really do value your feedback.    Your care team:                            Family Medicine Internal Medicine   MD Amarjit Ortega MD Shantel Branch-Fleming, MD Katya Georgiev PA-C Megan Hill, APRN CNP    Conor Osorio, MD Pediatrics   Izaiah Enriquez, KALI Silva, MD Bev Del Rio APRN CNP   MD Kayla Trinh MD Deborah Mielke, MD Christine Painter, APRN [facility]      Clinic hours: Monday - Thursday 7 am-7 pm; Fridays 7 am-5 pm.   Urgent care: Monday - Friday 11 am-9 pm; Saturday and Sunday 9 am-5 pm.  Pharmacy : Monday -Thursday 8 am-8 pm; Friday 8 am-6 pm; Saturday and Sunday 9 am-5 pm.     Clinic: [phone]   Pharmacy: [phone]        Patient Education     Understanding Seborrheic Dermatitis    Seborrheic dermatitis is a common type of rash that causes red, scaly, greasy skin. It occurs on skin that has oil glands, such as the face, scalp, and upper chest. It tends to last a long time, or go away and come back. Seborrheic dermatitis is not spread from person to person.  How to say it  [id number]   What causes seborrheic dermatitis?  The cause is not yet known. It may be partly caused by a type of yeast that grows on skin, along with excess oil production. Experts are still learning more. It s more common in men than women, and it can occur at any age. It happens more often in people with HIV/AIDS, Parkinson disease, alcoholic pancreatitis, hepatitis, or cancer. It can also get worse during times of stress.  Symptoms of seborrheic dermatitis  Symptoms can include skin that is:    Bumpy    Covered with yellow scales or crusts    Cracked    Greasy    Itchy    Leaking fluid    Painful    Red or orange  These symptoms can occur on skin:    Around the  nose    Behind the ears    In the beard    In the eyebrows    On the scalp, also known as dandruff    On the upper chest  You may also have acne, inflamed eyelids (blepharitis), or other skin conditions at the same time.  Treatment for seborrheic dermatitis  Treatment can reduce symptoms for a period of time. The types of treatments most often used include:    Antifungal shampoo, body wash, or cream. These contain medicines such as ketoconazole, fluconazole, selenium sulfide, ciclopirox, or tea tree oil.    Corticosteroid cream or ointment. These containmedicines such ashydrocortisone or fluocinolone acetonide.    Calcineurin inhibitorcream or ointment. These contain medicines such as pimecrolimus or tacrolimus.    Shampoo or cream with other medicines. These contain medicines such as coal tar, salicylic acid, or zinc pyrithione.    Sodium sulfacetemide creams and washes. These may also help.  Wash your skin gently. You can remove scales with oil and gentle rubbing or a brush.  Living with seborrheic dermatitis  Seborrheic dermatitis is an ongoing (chronic) condition. It can go away and then come back. You will likely need to use shampoo, cream, or ointment with medicine once or twice a week. This can help to keep symptoms from coming back or getting worse.  When to call your healthcare provider  Call your healthcare provider right away if you have any of these:    Symptoms that don t get better, or get worse    New symptoms   Date Last Reviewed: 5/1/2016 2000-2018 The "[employer]. [address] [place] [address], [place] [address]. All rights reserved. This information is not intended as a substitute for professional medical care. Always follow your healthcare professional's instructions.

## 2019-09-11 NOTE — LETTER
My Depression Action Plan  Name: Antonietta Christie   Date of Birth 1970  Date: 9/11/2019    My doctor: Aster Espinal   My clinic: [address] [id number] [phone]          GREEN    ZONE   Good Control    What it looks like:     Things are going generally well. You have normal up s and down s. You may even feel depressed from time to time, but bad moods usually last less than a day.   What you need to do:  1. Continue to care for yourself (see self care plan)  2. Check your depression survival kit and update it as needed  3. Follow your physician s recommendations including any medication.  4. Do not stop taking medication unless you consult with your physician first.           YELLOW         ZONE Getting Worse    What it looks like:     Depression is starting to interfere with your life.     It may be hard to get out of bed; you may be starting to isolate yourself from others.    Symptoms of depression are starting to last most all day and this has happened for several days.     You may have suicidal thoughts but they are not constant.   What you need to do:     1. Call your care team, your response to treatment will improve if you keep your care team informed of your progress. Yellow periods are signs an adjustment may need to be made.     2. Continue your self-care, even if you have to fake it!    3. Talk to someone in your support network    4. Open up your depression survival kit           RED    ZONE Medical Alert - Get Help    What it looks like:     Depression is seriously interfering with your life.     You may experience these or other symptoms: You can t get out of bed most days, can t work or engage in other necessary activities, you have trouble taking care of basic hygiene, or basic responsibilities, thoughts of suicide or death that will not go away, self-injurious behavior.     What you need to  do:  1. Call your care team and request a same-day appointment. If they are not available (weekends or after hours) call your local crisis line, emergency room or 911.            Depression Self Care Plan / Survival Kit    Self-Care for Depression  Here s the deal. Your body and mind are really not as separate as most people think.  What you do and think affects how you feel and how you feel influences what you do and think. This means if you do things that people who feel good do, it will help you feel better.  Sometimes this is all it takes.  There is also a place for medication and therapy depending on how severe your depression is, so be sure to consult with your medical provider and/ or Behavioral Health Consultant if your symptoms are worsening or not improving.     In order to better manage my stress, I will:    Exercise  Get some form of exercise, every day. This will help reduce pain and release endorphins, the  feel good  chemicals in your brain. This is almost as good as taking antidepressants!  This is not the same as joining a gym and then never going! (they count on that by the way ) It can be as simple as just going for a walk or doing some gardening, anything that will get you moving.      Hygiene   Maintain good hygiene (Get out of bed in the morning, Make your bed, Brush your teeth, Take a shower, and Get dressed like you were going to work, even if you are unemployed).  If your clothes don't fit try to get ones that do.    Diet  I will strive to eat foods that are good for me, drink plenty of water, and avoid excessive sugar, caffeine, alcohol, and other mood-altering substances.  Some foods that are helpful in depression are: complex carbohydrates, B vitamins, flaxseed, fish or fish oil, fresh fruits and vegetables.    Psychotherapy  I agree to participate in Individual Therapy (if recommended).    Medication  If prescribed medications, I agree to take them.  Missing doses can result in serious  side effects.  I understand that drinking alcohol, or other illicit drug use, may cause potential side effects.  I will not stop my medication abruptly without first discussing it with my provider.    Staying Connected With Others  I will stay in touch with my friends, family members, and my primary care provider/team.    Use your imagination  Be creative.  We all have a creative side; it doesn t matter if it s oil painting, sand castles, or mud pies! This will also kick up the endorphins.    Witness Beauty  (AKA stop and smell the roses) Take a look outside, even in mid-winter. Notice colors, textures. Watch the squirrels and birds.     Service to others  Be of service to others.  There is always someone else in need.  By helping others we can  get out of ourselves  and remember the really important things.  This also provides opportunities for practicing all the other parts of the program.    Humor  Laugh and be silly!  Adjust your TV habits for less news and crime-drama and more comedy.    Control your stress  Try breathing deep, massage therapy, biofeedback, and meditation. Find time to relax each day.     My support system    Clinic Contact:  Phone number:    Contact 1:  Phone number:    Contact 2:  Phone number:    Jew/:  Phone number:    Therapist:  Phone number:    Local crisis center:    Phone number:    Other community support:  Phone number:

## 2019-09-11 NOTE — NURSING NOTE
Prior to immunization administration, verified patients identity using patient s name and date of birth. Please see Immunization Activity for additional information.     Screening Questionnaire for Adult Immunization    Are you sick today?   No   Do you have allergies to medications, food, a vaccine component or latex?   Yes   Have you ever had a serious reaction after receiving a vaccination?   No   Do you have a long-term health problem with heart disease, lung disease, asthma, kidney disease, metabolic disease (e.g. diabetes), anemia, or other blood disorder?   Yes   Do you have cancer, leukemia, HIV/AIDS, or any other immune system problem?   No   In the past 3 months, have you taken medications that affect  your immune system, such as prednisone, other steroids, or anticancer drugs; drugs for the treatment of rheumatoid arthritis, Crohn s disease, or psoriasis; or have you had radiation treatments?   No   Have you had a seizure, or a brain or other nervous system problem?   No   During the past year, have you received a transfusion of blood or blood     products, or been given immune (gamma) globulin or antiviral drug?   No   For women: Are you pregnant or is there a chance you could become        pregnant during the next month?   No   Have you received any vaccinations in the past 4 weeks?   No     Immunization questionnaire was positive for at least one answer.  Notified Christine Painter.        Patient instructed to remain in clinic for 15 minutes afterwards, and to report any adverse reaction to me immediately.       Screening performed by Olga Solis MA on 9/11/2019 at 8:44 AM.
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
## 2019-09-12 ASSESSMENT — ANXIETY QUESTIONNAIRES: GAD7 TOTAL SCORE: 1

## 2019-09-13 LAB
BACTERIA SPEC CULT: ABNORMAL
BACTERIA SPEC CULT: ABNORMAL
SPECIMEN SOURCE: ABNORMAL

## 2019-09-13 RX ORDER — SULFAMETHOXAZOLE/TRIMETHOPRIM 800-160 MG
1 TABLET ORAL 2 TIMES DAILY
Qty: 6 TABLET | Refills: 0 | Status: SHIPPED | OUTPATIENT
Start: 2019-09-13 | End: 2019-09-16

## 2019-09-16 ENCOUNTER — MYC MEDICAL ADVICE (OUTPATIENT)
Dept: FAMILY MEDICINE | Facility: CLINIC | Age: 49
End: 2019-09-16

## 2019-09-16 NOTE — TELEPHONE ENCOUNTER
Proceed with PA? She was seen 9/11 with you, and per this encounter, med was denied due to follow up needed.

## 2019-09-17 NOTE — TELEPHONE ENCOUNTER
Central Prior Authorization Team   Phone: [phone]    PA Initiation    Medication: omeprazole (PRILOSEC) 20 MG DR capsule  Insurance Company: [employer] - Phone [phone] Fax [phone]  Ph[id number] [place] [facility] [address]  Filling Pharmacy Phone: [phone]  Filling Pharmacy Fax:    Start Date: 9/17/2019

## 2019-09-17 NOTE — TELEPHONE ENCOUNTER
Central Prior Authorization Team   Phone: [phone]    Prior Authorization Approval    Authorization Effective Date: 9/17/2019  Authorization Expiration Date: 9/17/2020  Medication: omeprazole (PRILOSEC) 20 MG DR capsule  Approved Dose/Quantity:   Reference #: [id number]   Insurance Company: [employer] - Phone [phone] Fax [phone]  Expected CoPay:       CoPay Card Available:      Foundation Assistance Needed:    Which Pharmacy is filling the prescription (Not needed for infusion/clinic administered): [facility] [id number] [place] [address]  Pharmacy Notified: Yes  Patient Notified: Yes  Sent patient a [employer] message regarding approval.

## 2019-10-02 ENCOUNTER — ANCILLARY PROCEDURE (OUTPATIENT)
Dept: MAMMOGRAPHY | Facility: CLINIC | Age: 49
End: 2019-10-02
Attending: NURSE PRACTITIONER
Payer: COMMERCIAL

## 2019-10-02 DIAGNOSIS — Z12.31 ENCOUNTER FOR SCREENING MAMMOGRAM FOR BREAST CANCER: ICD-10-CM

## 2019-10-02 PROCEDURE — 77067 SCR MAMMO BI INCL CAD: CPT

## 2019-10-02 PROCEDURE — 77063 BREAST TOMOSYNTHESIS BI: CPT

## 2019-11-04 DIAGNOSIS — I10 HYPERTENSION GOAL BP (BLOOD PRESSURE) < 140/90: Chronic | ICD-10-CM

## 2019-11-04 NOTE — TELEPHONE ENCOUNTER
"Requested Prescriptions   Pending Prescriptions Disp Refills     losartan (COZAAR) 100 MG tablet [Pharmacy Med Name: LOSARTAN POTASSIUM 100 MG TAB]  Last Written Prescription Date:  01/25/19  Last Fill Quantity: 90,  # refills: 1   Last Office Visit with [facility], [facility] or [facility] prescribing provider:  09/11/19-Thein   Future Office Visit:    30 tablet 5     Sig: TAKE 1 TABLET BY MOUTH EVERY DAY       Angiotensin-II Receptors Passed - 11/4/2019  1:53 AM        Passed - Last blood pressure under 140/90 in past 12 months     BP Readings from Last 3 Encounters:   09/11/19 134/77   02/15/19 144/72   01/25/19 138/80                 Passed - Recent (12 mo) or future (30 days) visit within the authorizing provider's specialty     Patient has had an office visit with the authorizing provider or a provider within the authorizing providers department within the previous 12 mos or has a future within next 30 days. See \"Patient Info\" tab in inbasket, or \"Choose Columns\" in Meds & Orders section of the refill encounter.              Passed - Medication is active on med list        Passed - Patient is age 18 or older        Passed - No active pregnancy on record        Passed - Normal serum creatinine on file in past 12 months     Recent Labs   Lab Test 09/11/19  0843   CR 0.77             Passed - Normal serum potassium on file in past 12 months     Recent Labs   Lab Test 09/11/19  0843   POTASSIUM 4.2                    Passed - No positive pregnancy test in past 12 months          "

## 2019-11-05 NOTE — TELEPHONE ENCOUNTER
Routing refill request to provider for review/approval because:  A break in medication: last given on 1/29/19 x 6 months.  Last OV 9/11/19 for AFE, medication not refilled at that time.    Sara Yu RN  [facility]/ [facility]

## 2019-11-06 RX ORDER — LOSARTAN POTASSIUM 100 MG/1
TABLET ORAL
Qty: 30 TABLET | Refills: 5 | Status: SHIPPED | OUTPATIENT
Start: 2019-11-06 | End: 2020-07-30

## 2019-12-16 ENCOUNTER — MYC MEDICAL ADVICE (OUTPATIENT)
Dept: FAMILY MEDICINE | Facility: CLINIC | Age: 49
End: 2019-12-16

## 2020-01-07 DIAGNOSIS — G25.81 RESTLESS LEGS SYNDROME (RLS): ICD-10-CM

## 2020-01-07 NOTE — TELEPHONE ENCOUNTER
"Requested Prescriptions   Pending Prescriptions Disp Refills     rOPINIRole (REQUIP) 5 MG tablet [Pharmacy Med Name: ROPINIROLE HCL 5 MG TABLET]  Last Written Prescription Date:  01/25/19  Last Fill Quantity: 90,  # refills: 3   Last Office Visit with G, [facility] or [facility] prescribing provider:  09/11/19-thein   Future Office Visit:    30 tablet 11     Sig: TAKE 1 TABLET (5 MG) BY MOUTH AT BEDTIME       Antiparkinson's Agents Protocol Failed - 1/7/2020  1:46 AM        Failed - CBC on record in past 12 months     Recent Labs   Lab Test 08/21/15  1426   WBC 7.7   RBC 4.68   HGB 13.3   HCT 41.1                    Failed - ALT on record in past 12 months         Recent Labs   Lab Test 01/31/18  1651   ALT 64*             Passed - Blood pressure under 140/90 in past 12 months     BP Readings from Last 3 Encounters:   09/11/19 134/77   02/15/19 144/72   01/25/19 138/80                 Passed - Serum Creatinine on file in past 12 months     Recent Labs   Lab Test 09/11/19  0843   CR 0.77             Passed - Medication is active on med list        Passed - Patient is age 18 or older        Passed - No active pregnancy on record        Passed - No positive pregnancy test in the past 12 months        Passed - Recent (6 mo) or future (30 days) visit within the authorizing provider's specialty     Patient had office visit in the last 6 months or has a visit in the next 30 days with authorizing provider or within the authorizing provider's specialty.  See \"Patient Info\" tab in inbasket, or \"Choose Columns\" in Meds & Orders section of the refill encounter.              "

## 2020-01-08 ENCOUNTER — TELEPHONE (OUTPATIENT)
Dept: FAMILY MEDICINE | Facility: CLINIC | Age: 50
End: 2020-01-08

## 2020-01-08 NOTE — TELEPHONE ENCOUNTER
Plan does not cover this medication. Please call plan at [phone] to initiate prior authorization or call/fax pharmacy to change medication at [phone]. Patient ID # is [id number].        [facility]

## 2020-01-08 NOTE — TELEPHONE ENCOUNTER
Routing refill request to provider for review/approval because:  Labs not current:  CBC0 08/21/15, ALT- 01/31/18    Love Mark RN

## 2020-01-10 RX ORDER — ROPINIROLE 5 MG/1
5 TABLET, FILM COATED ORAL AT BEDTIME
Qty: 30 TABLET | Refills: 0 | Status: SHIPPED | OUTPATIENT
Start: 2020-01-10 | End: 2020-01-15

## 2020-01-10 NOTE — TELEPHONE ENCOUNTER
Prior Authorization Approval    Authorization Effective Date: 1/10/2020  Authorization Expiration Date: 1/10/2023  Medication: dapagliflozin (FARXIGA) 10 MG TABS tablet-APPROVED  Approved Dose/Quantity:   Reference #:     Insurance Company: [employer] - Phone [phone] Fax [phone]  Expected CoPay:       CoPay Card Available:      Foundation Assistance Needed:    Which Pharmacy is filling the prescription (Not needed for infusion/clinic administered): [facility] [id number] [place] [address]  Pharmacy Notified: Yes  Patient Notified: No    Pharmacy will notify patient when medication is ready.

## 2020-01-10 NOTE — TELEPHONE ENCOUNTER
Central Prior Authorization Team   Phone: [phone]      PA Initiation    Medication: dapagliflozin (FARXIGA) 10 MG TABS tablet-Initiated  Insurance Company: [employer] - Phone [phone] Fax [phone]  [facility] [id number] [place] [facility] [address]  Filling Pharmacy Phone: [phone]  Filling Pharmacy Fax:    Start Date: 1/10/2020

## 2020-01-10 NOTE — TELEPHONE ENCOUNTER
Reason for Call:  Other prescription    Detailed comments: patient will be out on Saturday the 11th and she will need it for sure by Sunday the 12th.    Phone Number Patient can be reached at: Home number on file [phone] (home)    Best Time: any    Can we leave a detailed message on this number? YES    Call taken on 1/10/2020 at 2:43 PM by Radha Perez

## 2020-01-15 ENCOUNTER — OFFICE VISIT (OUTPATIENT)
Dept: FAMILY MEDICINE | Facility: CLINIC | Age: 50
End: 2020-01-15
Payer: COMMERCIAL

## 2020-01-15 VITALS
DIASTOLIC BLOOD PRESSURE: 66 MMHG | WEIGHT: 293 LBS | HEART RATE: 95 BPM | SYSTOLIC BLOOD PRESSURE: 113 MMHG | BODY MASS INDEX: 51.07 KG/M2 | TEMPERATURE: 98.1 F | OXYGEN SATURATION: 98 % | RESPIRATION RATE: 16 BRPM

## 2020-01-15 DIAGNOSIS — G47.33 OSA (OBSTRUCTIVE SLEEP APNEA): Chronic | ICD-10-CM

## 2020-01-15 DIAGNOSIS — Z23 NEED FOR SHINGLES VACCINE: ICD-10-CM

## 2020-01-15 DIAGNOSIS — G25.81 RESTLESS LEGS SYNDROME (RLS): ICD-10-CM

## 2020-01-15 DIAGNOSIS — N89.8 VAGINAL ITCHING: ICD-10-CM

## 2020-01-15 DIAGNOSIS — Z13.6 CARDIOVASCULAR SCREENING; LDL GOAL LESS THAN 100: ICD-10-CM

## 2020-01-15 DIAGNOSIS — I10 ESSENTIAL HYPERTENSION WITH GOAL BLOOD PRESSURE LESS THAN 140/90: ICD-10-CM

## 2020-01-15 DIAGNOSIS — E11.65 TYPE 2 DIABETES MELLITUS WITH HYPERGLYCEMIA, WITHOUT LONG-TERM CURRENT USE OF INSULIN (H): Primary | ICD-10-CM

## 2020-01-15 DIAGNOSIS — Z12.11 SPECIAL SCREENING FOR MALIGNANT NEOPLASMS, COLON: ICD-10-CM

## 2020-01-15 DIAGNOSIS — Z13.21 ENCOUNTER FOR VITAMIN DEFICIENCY SCREENING: ICD-10-CM

## 2020-01-15 DIAGNOSIS — E66.01 MORBID OBESITY WITH BMI OF 50.0-59.9, ADULT (H): ICD-10-CM

## 2020-01-15 LAB
SPECIMEN SOURCE: NORMAL
WET PREP SPEC: NORMAL

## 2020-01-15 PROCEDURE — 87210 SMEAR WET MOUNT SALINE/INK: CPT | Performed by: NURSE PRACTITIONER

## 2020-01-15 PROCEDURE — 90750 HZV VACC RECOMBINANT IM: CPT | Performed by: NURSE PRACTITIONER

## 2020-01-15 PROCEDURE — 90471 IMMUNIZATION ADMIN: CPT | Performed by: NURSE PRACTITIONER

## 2020-01-15 PROCEDURE — 99214 OFFICE O/P EST MOD 30 MIN: CPT | Mod: 25 | Performed by: NURSE PRACTITIONER

## 2020-01-15 RX ORDER — GABAPENTIN 300 MG/1
CAPSULE ORAL
Qty: 270 CAPSULE | Refills: 3 | Status: SHIPPED | OUTPATIENT
Start: 2020-01-15 | End: 2021-10-14

## 2020-01-15 RX ORDER — ROPINIROLE 5 MG/1
5 TABLET, FILM COATED ORAL AT BEDTIME
Qty: 90 TABLET | Refills: 1 | Status: SHIPPED | OUTPATIENT
Start: 2020-01-15 | End: 2020-03-09

## 2020-01-15 RX ORDER — METFORMIN HCL 500 MG
500 TABLET, EXTENDED RELEASE 24 HR ORAL
Qty: 90 TABLET | Refills: 3 | Status: SHIPPED | OUTPATIENT
Start: 2020-01-15 | End: 2021-01-19

## 2020-01-15 RX ORDER — PHENTERMINE HYDROCHLORIDE 15 MG/1
15 CAPSULE ORAL EVERY MORNING
Qty: 30 CAPSULE | Refills: 0 | Status: SHIPPED | OUTPATIENT
Start: 2020-01-15 | End: 2020-02-14

## 2020-01-15 ASSESSMENT — PAIN SCALES - GENERAL: PAINLEVEL: NO PAIN (0)

## 2020-01-15 NOTE — PROGRESS NOTES
Subjective     Antonietta Christie is a 49 year old female who presents to clinic today for the following health issues:    HPI     Diabetes Follow-up      How often are you checking your blood sugar? Not at all    What concerns do you have today about your diabetes? None     Do you have any of these symptoms? (Select all that apply)  No numbness or tingling in feet.  No redness, sores or blisters on feet.  No complaints of excessive thirst.  No reports of blurry vision.  No significant changes to weight.    Have you had a diabetic eye exam in the last 12 months? Yes- Date of last eye exam: 12/2019        BP Readings from Last 2 Encounters:   01/15/20 113/66   09/11/19 134/77     Hemoglobin A1C (%)   Date Value   09/11/2019 6.5 (H)   01/25/2019 6.5 (H)     LDL Cholesterol Calculated (mg/dL)   Date Value   01/25/2019 60   01/31/2018 38         Hypertension Follow-up      Do you check your blood pressure regularly outside of the clinic? No     Are you following a low salt diet? No    Are your blood pressures ever more than 140 on the top number (systolic) OR more   than 90 on the bottom number (diastolic), for example 140/90? Does not monitor BP outside of office visit       How many servings of fruits and vegetables do you eat daily?  2-3    On average, how many sweetened beverages do you drink each day (Examples: soda, juice, sweet tea, etc.  Do NOT count diet or artificially sweetened beverages)?   0    How many days per week do you exercise enough to make your heart beat faster? 3 or less    How many minutes a day do you exercise enough to make your heart beat faster? 9 or less  How many days per week do you miss taking your medication? 1    What makes it hard for you to take your medications?  remembering to take    Medication Followup of Farxiga    Taking Medication as prescribed: yes    Side Effects:  Yes, yeast infection- currently having itching, no discharge.    Medication Helping Symptoms:  yes       Medication  Followup of Ropinirole     Taking Medication as prescribed: yes    Side Effects:  None    Medication Helping Symptoms:  yes         Hyperlipidemia Follow-Up      Are you regularly taking any medication or supplement to lower your cholesterol?   Yes- Lipitor 10 mg daily    Are you having muscle aches or other side effects that you think could be caused by your cholesterol lowering medication?  No      Patient Active Problem List   Diagnosis     GERD (gastroesophageal reflux disease)     CARDIOVASCULAR SCREENING; LDL GOAL LESS THAN 100     Bunion of great toe of left foot     Insomnia     Restless legs syndrome (RLS)     Peripheral edema     Carpal tunnel syndrome     Essential hypertension with goal blood pressure less than 140/90     Seborrheic keratosis     CHELO (obstructive sleep apnea)- Moderate (AHI 26)     Type 2 diabetes mellitus with hyperglycemia, without long-term current use of insulin (H)     Morbid obesity with BMI of 50.0-59.9, adult (H)     Past Surgical History:   Procedure Laterality Date     TONSILLECTOMY & ADENOIDECTOMY  1979       Social History     Tobacco Use     Smoking status: Never Smoker     Smokeless tobacco: Never Used   Substance Use Topics     Alcohol use: Yes     Comment: occasional     Family History   Problem Relation Age of Onset     Family History Negative No family hx of          Current Outpatient Medications   Medication Sig Dispense Refill     aspirin (ASA) 81 MG EC tablet Take 1 tablet (81 mg) by mouth daily 90 tablet 3     atorvastatin (LIPITOR) 10 MG tablet Take 1 tablet (10 mg) by mouth daily 90 tablet 3     gabapentin (NEURONTIN) 300 MG capsule Take one capsule in the morning and 2 at night by mouth 270 capsule 3     ketoconazole (NIZORAL) 2 % external shampoo Apply topically daily as needed for itching or irritation 120 mL 3     losartan (COZAAR) 100 MG tablet TAKE 1 TABLET BY MOUTH EVERY DAY 30 tablet 5     metFORMIN (GLUCOPHAGE-XR) 500 MG 24 hr tablet Take 1 tablet (500  mg) by mouth daily (with dinner) 90 tablet 3     omeprazole (PRILOSEC) 20 MG DR capsule Take 1 capsule (20 mg) by mouth daily 90 capsule 3     order for DME Equipment being ordered: Glucometer per insurance preference, lancets, test strips.  Patient to check sugars 2 times daily, 90 day supply for test strips and lancets, refill 3 times 1 Device 3     ORDER FOR DME Resmed S9 Auto CPAP 8-12cm H2O, Mirage Fx for her small nasal mask       rOPINIRole (REQUIP) 5 MG tablet Take 1 tablet (5 mg) by mouth At Bedtime 90 tablet 1     BP Readings from Last 3 Encounters:   01/15/20 113/66   09/11/19 134/77   02/15/19 144/72    Wt Readings from Last 3 Encounters:   01/15/20 (!) 156.9 kg (345 lb 12.8 oz)   09/11/19 (!) 165.3 kg (364 lb 6.4 oz)   02/15/19 (!) 162.4 kg (358 lb)           Reviewed and updated as needed this visit by Provider         Review of Systems   ROS COMP: Constitutional, HEENT, cardiovascular, pulmonary, gi and gu systems are negative, except as otherwise noted.      Objective    /66 (BP Location: Left arm, Patient Position: Chair, Cuff Size: Adult Large)   Pulse 95   Temp 98.1  F (36.7  C) (Oral)   Resp 16   Wt (!) 156.9 kg (345 lb 12.8 oz)   SpO2 98%   BMI 51.07 kg/m    Body mass index is 51.07 kg/m .  Physical Exam   GENERAL: healthy, alert and no distress  EYES: Eyes grossly normal to inspection, PERRL and conjunctivae and sclerae normal  HENT: ear canals and TM's normal, nose and mouth without ulcers or lesions  NECK: no adenopathy, no asymmetry, masses, or scars and thyroid normal to palpation  RESP: lungs clear to auscultation - no rales, rhonchi or wheezes  CV: regular rate and rhythm, normal S1 S2, no S3 or S4, no murmur, click or rub, no peripheral edema and peripheral pulses strong  ABDOMEN: soft, nontender, no hepatosplenomegaly, no masses and bowel sounds normal  MS: no gross musculoskeletal defects noted, no edema  SKIN: no suspicious lesions or rashes  NEURO: Normal strength and  tone, mentation intact and speech normal  BACK: no CVA tenderness, no paralumbar tenderness  PSYCH: mentation appears normal, affect normal/bright  LYMPH: normal ant/post cervical, supraclavicular nodes  Diabetic foot exam: normal DP and PT pulses, no trophic changes or ulcerative lesions, normal sensory exam and normal monofilament exam    Diagnostic Test Results:  Labs reviewed in Epic  none         Assessment & Plan     1. Type 2 diabetes mellitus with hyperglycemia, without long-term current use of insulin (H)  Getting her back on Metformin.  - Albumin Random Urine Quantitative with Creat Ratio  - TSH WITH FREE T4 REFLEX  - Hemoglobin A1c  - metFORMIN (GLUCOPHAGE-XR) 500 MG 24 hr tablet; Take 1 tablet (500 mg) by mouth daily (with dinner)  Dispense: 90 tablet; Refill: 3    2. Morbid obesity with BMI of 50.0-59.9, adult (H)  Benefits of weight loss reviewed in detail, encouraged her to cut back on the carbohydrates in the diet, consume more fruits and vegetables, drink plenty of water, avoid fruit juices, sodas, get 150 min moderate exercise/week.  Recheck weight in 6 months.      3. Restless legs syndrome (RLS)  Refilled Requip, tolerating well.  - rOPINIRole (REQUIP) 5 MG tablet; Take 1 tablet (5 mg) by mouth At Bedtime  Dispense: 90 tablet; Refill: 1    4. Vaginal itching    - Wet prep    5. CHELO (obstructive sleep apnea)- Moderate (AHI 26)  Continue CPAP    6. Essential hypertension with goal blood pressure less than 140/90  BP at goal  - Albumin Random Urine Quantitative with Creat Ratio    7. Special screening for malignant neoplasms, colon    - Fecal colorectal cancer screen (FIT); Future    8. Encounter for vitamin deficiency screening    - Vitamin D Deficiency    9. CARDIOVASCULAR SCREENING; LDL GOAL LESS THAN 100    - Lipid panel reflex to direct LDL Fasting; Future       Work on weight loss  Regular exercise  See Patient Instructions    No follow-ups on file.    RONY Kim [facility]  [facility]

## 2020-01-15 NOTE — PATIENT INSTRUCTIONS
Patient Education     Phentermine tablets or capsules  Brand Names: Adipex-P, Atti-Plex P, Atti-Plex P Spansule, Fastin, Lomaira, Pro-Fast, Sarah-8  What is this medicine?  PHENTERMINE (FEN ter meen) decreases your appetite. It is used with a reduced calorie diet and exercise to help you lose weight.  How should I use this medicine?  Take this medicine by mouth with a glass of water. Follow the directions on the prescription label. The instructions for use may differ based on the product and dose you are taking. Avoid taking this medicine in the evening. It may interfere with sleep. Take your doses at regular intervals. Do not take your medicine more often than directed.  Talk to your pediatrician regarding the use of this medicine in children. While this drug may be prescribed for children 17 years or older for selected conditions, precautions do apply.  What side effects may I notice from receiving this medicine?  Side effects that you should report to your doctor or health care professional as soon as possible:    allergic reactions like skin rash, itching or hives, swelling of the face, lips, or tongue)    anxiety    breathing problems    changes in vision    chest pain or chest tightness    depressed mood or other mood changes    hallucinations, loss of contact with reality    fast, irregular heartbeat    increased blood pressure    irritable    nervousness or restlessness    painful urination    palpitations    tremors    trouble sleeping    seizures    signs and symptoms of a stroke like changes in vision; confusion; trouble speaking or understanding; severe headaches; sudden numbness or weakness of the face, arm or leg; trouble walking; dizziness; loss of balance or coordination    unusually weak or tired    vomiting  Side effects that usually do not require medical attention (report to your doctor or health care professional if they continue or are bothersome):    constipation or diarrhea    dry  mouth    headache    nausea    stomach upset    sweating  What may interact with this medicine?  Do not take this medicine with any of the following medications:    MAOIs like Carbex, Eldepryl, Marplan, Nardil, and Parnate    medicines for colds or breathing difficulties like pseudoephedrine or phenylephrine    procarbazine    sibutramine    stimulant medicines for attention disorders, weight loss, or to stay awake  This medicine may also interact with the following medications:    certain medicines for depression, anxiety, or psychotic disturbances    linezolid    medicines for diabetes    medicines for high blood pressure  What if I miss a dose?  If you miss a dose, take it as soon as you can. If it is almost time for your next dose, take only that dose. Do not take double or extra doses.  Where should I keep my medicine?  Keep out of the reach of children. This medicine can be abused. Keep your medicine in a safe place to protect it from theft. Do not share this medicine with anyone. Selling or giving away this medicine is dangerous and against the law.  This medicine may cause accidental overdose and death if taken by other adults, children, or pets. Mix any unused medicine with a substance like cat litter or coffee grounds. Then throw the medicine away in a sealed container like a sealed bag or a coffee can with a lid. Do not use the medicine after the expiration date.  Store at room temperature between 20 and 25 degrees C (68 and 77 degrees F). Keep container tightly closed.  What should I tell my health care provider before I take this medicine?  They need to know if you have any of these conditions:    agitation or nervousness    diabetes    glaucoma    heart disease    high blood pressure    history of drug abuse or addiction    history of stroke    kidney disease    lung disease called Primary Pulmonary Hypertension (PPH)    taken an MAOI like Carbex, Eldepryl, Marplan, Nardil, or Parnate in last 14  days    taking stimulant medicines for attention disorders, weight loss, or to stay awake    thyroid disease    an unusual or allergic reaction to phentermine, other medicines, foods, dyes, or preservatives    pregnant or trying to get pregnant    breast-feeding  What should I watch for while using this medicine?  Notify your physician immediately if you become short of breath while doing your normal activities.  Do not take this medicine within 6 hours of bedtime. It can keep you from getting to sleep. Avoid drinks that contain caffeine and try to stick to a regular bedtime every night.  This medicine was intended to be used in addition to a healthy diet and exercise. The best results are achieved this way. This medicine is only indicated for short-term use. Eventually your weight loss may level out. At that point, the drug will only help you maintain your new weight. Do not increase or in any way change your dose without consulting your doctor.  You may get drowsy or dizzy. Do not drive, use machinery, or do anything that needs mental alertness until you know how this medicine affects you. Do not stand or sit up quickly, especially if you are an older patient. This reduces the risk of dizzy or fainting spells. Alcohol may increase dizziness and drowsiness. Avoid alcoholic drinks.  NOTE:This sheet is a summary. It may not cover all possible information. If you have questions about this medicine, talk to your doctor, pharmacist, or health care provider. Copyright  2019 [employer]

## 2020-01-16 ENCOUNTER — DOCUMENTATION ONLY (OUTPATIENT)
Dept: FAMILY MEDICINE | Facility: CLINIC | Age: 50
End: 2020-01-16

## 2020-01-16 DIAGNOSIS — Z13.21 ENCOUNTER FOR VITAMIN DEFICIENCY SCREENING: ICD-10-CM

## 2020-01-16 DIAGNOSIS — Z13.6 SCREENING FOR CARDIOVASCULAR CONDITION: Primary | ICD-10-CM

## 2020-01-16 DIAGNOSIS — E11.65 TYPE 2 DIABETES MELLITUS WITH HYPERGLYCEMIA, WITHOUT LONG-TERM CURRENT USE OF INSULIN (H): ICD-10-CM

## 2020-01-16 LAB
CHOLEST SERPL-MCNC: 153 MG/DL
CREAT UR-MCNC: 158 MG/DL
DEPRECATED CALCIDIOL+CALCIFEROL SERPL-MC: 22 UG/L (ref 20–75)
HBA1C MFR BLD: 6 % (ref 0–5.6)
HDLC SERPL-MCNC: 38 MG/DL
LDLC SERPL CALC-MCNC: 76 MG/DL
MICROALBUMIN UR-MCNC: 21 MG/L
MICROALBUMIN/CREAT UR: 13.1 MG/G CR (ref 0–25)
NONHDLC SERPL-MCNC: 115 MG/DL
TRIGL SERPL-MCNC: 196 MG/DL
TSH SERPL DL<=0.005 MIU/L-ACNC: 3.01 MU/L (ref 0.4–4)

## 2020-01-16 PROCEDURE — 84443 ASSAY THYROID STIM HORMONE: CPT | Performed by: NURSE PRACTITIONER

## 2020-01-16 PROCEDURE — 36415 COLL VENOUS BLD VENIPUNCTURE: CPT | Performed by: NURSE PRACTITIONER

## 2020-01-16 PROCEDURE — 80061 LIPID PANEL: CPT | Performed by: NURSE PRACTITIONER

## 2020-01-16 PROCEDURE — 82043 UR ALBUMIN QUANTITATIVE: CPT | Performed by: NURSE PRACTITIONER

## 2020-01-16 PROCEDURE — 83036 HEMOGLOBIN GLYCOSYLATED A1C: CPT | Performed by: NURSE PRACTITIONER

## 2020-01-16 PROCEDURE — 82306 VITAMIN D 25 HYDROXY: CPT | Performed by: NURSE PRACTITIONER

## 2020-02-06 DIAGNOSIS — E11.65 TYPE 2 DIABETES MELLITUS WITH HYPERGLYCEMIA, WITHOUT LONG-TERM CURRENT USE OF INSULIN (H): ICD-10-CM

## 2020-02-06 NOTE — TELEPHONE ENCOUNTER
"Requested Prescriptions   Pending Prescriptions Disp Refills     atorvastatin (LIPITOR) 10 MG tablet [Pharmacy Med Name: ATORVASTATIN 10 MG TABLET]  Last Written Prescription Date:  01/25/19  Last Fill Quantity: 90,  # refills: 3   Last Office Visit with [facility] or [facility] prescribing provider:  01/15/2020-Thein   Future Office Visit:    30 tablet 11     Sig: TAKE 1 TABLET BY MOUTH EVERY DAY       Statins Protocol Passed - 2/6/2020  4:33 AM        Passed - LDL on file in past 12 months     Recent Labs   Lab Test 01/16/20  0731   LDL 76             Passed - No abnormal creatine kinase in past 12 months     No lab results found.             Passed - Recent (12 mo) or future (30 days) visit within the authorizing provider's specialty     Patient has had an office visit with the authorizing provider or a provider within the authorizing providers department within the previous 12 mos or has a future within next 30 days. See \"Patient Info\" tab in inbasket, or \"Choose Columns\" in Meds & Orders section of the refill encounter.              Passed - Medication is active on med list        Passed - Patient is age 18 or older        Passed - No active pregnancy on record        Passed - No positive pregnancy test in past 12 months        ASPIRIN LOW DOSE 81 MG EC tablet [Pharmacy Med Name: ASPIRIN EC 81 MG TABLET]  Last Written Prescription Date:  01/25/19  Last Fill Quantity: 90,  # refills: 3   [place] [facility] prescribing provider:  01/15/2020-thein   Future Office Visit:    30 tablet 11     Sig: TAKE 1 TABLET BY MOUTH DAILY       Analgesics (Non-Narcotic Tylenol and ASA Only) Passed - 2/6/2020  4:33 AM        Passed - Recent (12 mo) or future (30 days) visit within the authorizing provider's specialty     Patient has had an office visit with the authorizing provider or a provider within the authorizing providers department within the previous 12 mos or has a future within next 30 days. See " "\"Patient Info\" tab in inbasket, or \"Choose Columns\" in Meds & Orders section of the refill encounter.              Passed - Patient is age 20 years or older     If ASA is flagged for ages under 20 years old. Forward to provider for confirmation Ryes Syndrome is not a concern.              Passed - Medication is active on med list          "

## 2020-02-10 RX ORDER — ATORVASTATIN CALCIUM 10 MG/1
TABLET, FILM COATED ORAL
Qty: 30 TABLET | Refills: 11 | Status: SHIPPED | OUTPATIENT
Start: 2020-02-10 | End: 2021-02-04

## 2020-02-10 RX ORDER — ASPIRIN 81 MG/1
TABLET, COATED ORAL
Qty: 30 TABLET | Refills: 11 | Status: SHIPPED | OUTPATIENT
Start: 2020-02-10 | End: 2021-02-18

## 2020-02-10 NOTE — TELEPHONE ENCOUNTER
Prescription approved per [facility] Refill Protocol.    Love Narayan RN  [place]

## 2020-02-14 ENCOUNTER — OFFICE VISIT (OUTPATIENT)
Dept: FAMILY MEDICINE | Facility: CLINIC | Age: 50
End: 2020-02-14
Payer: COMMERCIAL

## 2020-02-14 VITALS
DIASTOLIC BLOOD PRESSURE: 78 MMHG | WEIGHT: 293 LBS | HEART RATE: 85 BPM | OXYGEN SATURATION: 96 % | HEIGHT: 69 IN | BODY MASS INDEX: 43.4 KG/M2 | RESPIRATION RATE: 18 BRPM | SYSTOLIC BLOOD PRESSURE: 135 MMHG

## 2020-02-14 DIAGNOSIS — E66.01 MORBID OBESITY WITH BMI OF 50.0-59.9, ADULT (H): ICD-10-CM

## 2020-02-14 PROCEDURE — 99213 OFFICE O/P EST LOW 20 MIN: CPT | Performed by: FAMILY MEDICINE

## 2020-02-14 RX ORDER — PHENTERMINE HYDROCHLORIDE 15 MG/1
15 CAPSULE ORAL EVERY MORNING
Qty: 30 CAPSULE | Refills: 0 | Status: SHIPPED | OUTPATIENT
Start: 2020-02-14 | End: 2020-07-30

## 2020-02-14 ASSESSMENT — PATIENT HEALTH QUESTIONNAIRE - PHQ9: SUM OF ALL RESPONSES TO PHQ QUESTIONS 1-9: 3

## 2020-02-14 ASSESSMENT — MIFFLIN-ST. JEOR: SCORE: 2271.97

## 2020-02-14 NOTE — PROGRESS NOTES
Subjective     Antonietta Christie is a 49 year old female who presents to clinic today for the following health issues:    HPI   Medication Followup of Phentermine    Taking Medication as prescribed: yes    Side Effects:  None    Medication Helping Symptoms:  Not helping with forgetting about food   Took 15 years ago, helped her with weight loss  Started phentermine one month ago   No side effects   Patient denies anxiety, tremor , hypertension.   She lost 5 lbs in the last month    Following with national weight [facility] wellness     Lab Results   Component Value Date    A1C 6.0 01/16/2020    A1C 6.5 09/11/2019    A1C 6.5 01/25/2019    A1C 7.0 05/10/2018    A1C 7.4 02/19/2018           Wt Readings from Last 4 Encounters:   02/14/20 (!) 158.8 kg (350 lb)   01/15/20 (!) 156.9 kg (345 lb 12.8 oz)   09/11/19 (!) 165.3 kg (364 lb 6.4 oz)   02/15/19 (!) 162.4 kg (358 lb)           Patient Active Problem List   Diagnosis     GERD (gastroesophageal reflux disease)     CARDIOVASCULAR SCREENING; LDL GOAL LESS THAN 100     Bunion of great toe of left foot     Insomnia     Restless legs syndrome (RLS)     Peripheral edema     Carpal tunnel syndrome     Essential hypertension with goal blood pressure less than 140/90     Seborrheic keratosis     CHELO (obstructive sleep apnea)- Moderate (AHI 26)     Type 2 diabetes mellitus with hyperglycemia, without long-term current use of insulin (H)     Morbid obesity with BMI of 50.0-59.9, adult (H)     Past Surgical History:   Procedure Laterality Date     TONSILLECTOMY & ADENOIDECTOMY  1979       Social History     Tobacco Use     Smoking status: Never Smoker     Smokeless tobacco: Never Used   Substance Use Topics     Alcohol use: Yes     Comment: occasional     Family History   Problem Relation Age of Onset     Hyperlipidemia Father      Family History Negative No family hx of          Current Outpatient Medications   Medication Sig Dispense Refill     ASPIRIN LOW DOSE 81 MG EC tablet TAKE 1  TABLET BY MOUTH DAILY 30 tablet 11     atorvastatin (LIPITOR) 10 MG tablet TAKE 1 TABLET BY MOUTH EVERY DAY 30 tablet 11     gabapentin (NEURONTIN) 300 MG capsule Take one capsule in the morning and 2 at night by mouth 270 capsule 3     ketoconazole (NIZORAL) 2 % external shampoo Apply topically daily as needed for itching or irritation 120 mL 3     losartan (COZAAR) 100 MG tablet TAKE 1 TABLET BY MOUTH EVERY DAY 30 tablet 5     metFORMIN (GLUCOPHAGE-XR) 500 MG 24 hr tablet Take 1 tablet (500 mg) by mouth daily (with dinner) 90 tablet 3     omeprazole (PRILOSEC) 20 MG DR capsule Take 1 capsule (20 mg) by mouth daily 90 capsule 3     order for DME Equipment being ordered: Glucometer per insurance preference, lancets, test strips.  Patient to check sugars 2 times daily, 90 day supply for test strips and lancets, refill 3 times 1 Device 3     ORDER FOR DME Resmed S9 Auto CPAP 8-12cm H2O, Mirage Fx for her small nasal mask       phentermine (ADIPEX-P) 15 MG capsule Take 1 capsule (15 mg) by mouth every morning 30 capsule 0     rOPINIRole (REQUIP) 5 MG tablet Take 1 tablet (5 mg) by mouth At Bedtime 90 tablet 1     Allergies   Allergen Reactions     Lisinopril Cough     No Clinical Screening - See Comments Other (See Comments)     Hay fever (fall)--runny nose, sneezing, itchy eyes  Manganese violet dye in make up--red, itchy, mattery eyes     Recent Labs   Lab Test 01/16/20  0731 09/11/19  0843 01/25/19  0800  01/31/18  1651  02/22/16  0922 08/21/15  1426   A1C 6.0* 6.5* 6.5*   < > 7.2*   < > 7.9*  --    LDL 76  --  60  --  38   < >  --   --    HDL 38*  --  42*  --  45*   < >  --   --    TRIG 196*  --  133  --  179*   < >  --   --    ALT  --   --   --   --  64*  --  137* 96*   CR  --  0.77 0.76  --  0.71   < > 0.73 0.78   GFRESTIMATED  --  90 >90  --  88   < > 85 80   GFRESTBLACK  --  >90 >90  --  >90   < > >90   GFR Calc   >90   GFR Calc     POTASSIUM  --  4.2 4.1  --  4.2   < > 4.7 3.8  "  TSH 3.01  --   --   --  3.43  --  1.30  --     < > = values in this interval not displayed.      BP Readings from Last 3 Encounters:   02/14/20 135/78   01/15/20 113/66   09/11/19 134/77    Wt Readings from Last 3 Encounters:   02/14/20 (!) 158.8 kg (350 lb)   01/15/20 (!) 156.9 kg (345 lb 12.8 oz)   09/11/19 (!) 165.3 kg (364 lb 6.4 oz)                    Reviewed and updated as needed this visit by Provider  Tobacco  Allergies  Meds  Problems  Med Hx  Surg Hx  Fam Hx         Review of Systems   ROS COMP: Constitutional, HEENT, cardiovascular, pulmonary, gi and gu systems are negative, except as otherwise noted.      Objective    /78   Pulse 85   Resp 18   Ht 1.753 m (5' 9\")   Wt (!) 158.8 kg (350 lb)   SpO2 96%   BMI 51.69 kg/m    Body mass index is 51.69 kg/m .  Physical Exam   GENERAL: healthy, alert and no distress  NECK: no adenopathy, no asymmetry, masses, or scars and thyroid normal to palpation  RESP: lungs clear to auscultation - no rales, rhonchi or wheezes  CV: regular rate and rhythm, normal S1 S2, no S3 or S4, no murmur, click or rub, no peripheral edema and peripheral pulses strong  ABDOMEN: soft, nontender, no hepatosplenomegaly, no masses and bowel sounds normal  MS: no gross musculoskeletal defects noted, no edema            Assessment & Plan     1. Morbid obesity with BMI of 50.0-59.9, adult (H)  Patient is here requesting a refill on phentermine for weight loss.  Patient states that she took phentermine 15 years ago, helped her with weight loss  Started phentermine one month ago   No side effects   Patient denies anxiety, tremor , hypertension.   She lost 5 lbs in the last month    Following with [employer] [facility].  Patient is motivated to lose weight. she is making dietary changes to lose weight.  Discussed side effects of medications.  Refilled phentermine for 1 month.  Advised patient to see her provider if she needs more refills.  - phentermine (ADIPEX-P) 15 MG " capsule; Take 1 capsule (15 mg) by mouth every morning  Dispense: 30 capsule; Refill: 0           Return in about 4 weeks (around 3/13/2020).  Patient verbalized understanding and agreed on the plan of care.  All questions answered.  Jc Narayan MD  [facility]

## 2020-03-09 ENCOUNTER — MYC REFILL (OUTPATIENT)
Dept: FAMILY MEDICINE | Facility: CLINIC | Age: 50
End: 2020-03-09

## 2020-03-09 DIAGNOSIS — G25.81 RESTLESS LEGS SYNDROME (RLS): ICD-10-CM

## 2020-03-09 NOTE — TELEPHONE ENCOUNTER
"Requested Prescriptions   Pending Prescriptions Disp Refills     rOPINIRole (REQUIP) 5 MG tablet 90 tablet 1     Sig: Take 1 tablet (5 mg) by mouth At Bedtime       Antiparkinson's Agents Protocol Failed - 3/9/2020  5:30 PM        Failed - CBC on record in past 12 months     Recent Labs   Lab Test 08/21/15  1426   WBC 7.7   RBC 4.68   HGB 13.3   HCT 41.1                    Failed - ALT on record in past 12 months         Recent Labs   Lab Test 01/31/18  1651   ALT 64*             Passed - Blood pressure under 140/90 in past 12 months     BP Readings from Last 3 Encounters:   02/14/20 135/78   01/15/20 113/66   09/11/19 134/77                 Passed - Serum Creatinine on file in past 12 months     Recent Labs   Lab Test 09/11/19  0843   CR 0.77             Passed - Medication is active on med list        Passed - Patient is age 18 or older        Passed - No active pregnancy on record        Passed - No positive pregnancy test in the past 12 months        Passed - Recent (6 mo) or future (30 days) visit within the authorizing provider's specialty     Patient had office visit in the last 6 months or has a visit in the next 30 days with authorizing provider or within the authorizing provider's specialty.  See \"Patient Info\" tab in inbasket, or \"Choose Columns\" in Meds & Orders section of the refill encounter.               rOPINIRole (REQUIP) 5 MG tablet  Last Written Prescription Date:  1/15/2020  Last Fill Quantity: 90,  # refills: 1   Last office visit: 2/14/2020 with prescribing provider:  SHOLA Painter   Future Office Visit:      "

## 2020-03-12 RX ORDER — ROPINIROLE 5 MG/1
5 TABLET, FILM COATED ORAL AT BEDTIME
Qty: 90 TABLET | Refills: 0 | Status: SHIPPED | OUTPATIENT
Start: 2020-03-12 | End: 2020-07-03

## 2020-03-12 NOTE — TELEPHONE ENCOUNTER
Remaining refill sent to patient's requested pharmacy.   Maryse Nassar RN  [facility]

## 2020-06-05 DIAGNOSIS — Z12.11 SCREEN FOR COLON CANCER: Primary | ICD-10-CM

## 2020-06-12 ENCOUNTER — E-VISIT (OUTPATIENT)
Dept: FAMILY MEDICINE | Facility: CLINIC | Age: 50
End: 2020-06-12
Payer: COMMERCIAL

## 2020-06-12 DIAGNOSIS — N76.0 VAGINITIS AND VULVOVAGINITIS: Primary | ICD-10-CM

## 2020-06-12 PROCEDURE — 99421 OL DIG E/M SVC 5-10 MIN: CPT | Performed by: NURSE PRACTITIONER

## 2020-06-12 RX ORDER — FLUCONAZOLE 150 MG/1
150 TABLET ORAL ONCE
Qty: 1 TABLET | Refills: 0 | Status: SHIPPED | OUTPATIENT
Start: 2020-06-12 | End: 2020-07-30

## 2020-06-12 NOTE — PATIENT INSTRUCTIONS
Thank you for choosing us for your care. I have placed an order for a prescription so that you can start treatment. View your full visit summary for details by clicking on the link below. Your pharmacist will able to address any questions you may have about the medication.     If you re not feeling better within 2-3 days, please schedule an appointment.  You can schedule an appointment right here in [employer], or call [phone]  If the visit is for the same symptoms as your e-visit, we ll refund the cost of your e-visit if seen within seven days.      Yeast Infection (Candida Vaginal Infection)    You have a Candida vaginal infection. This is also known as a yeast infection. It is most often caused by a type of yeast (fungus) called Candida. Candida are normally found in the vagina. But if they increase in number, this can lead to infection and cause symptoms.  Symptoms of a yeast infection can include:    Clumpy or thin, white discharge, which may look like cottage cheese    Itching or burning    Burning with urination  Certain factors can make a yeast infection more likely. These can include:    Taking certain medicines, such as antibiotics or birth control pills    Pregnancy    Diabetes    Weak immune system  A yeast infection is most often treated with antifungal medicine. This may be given as a vaginal cream or pills you take by mouth. Treatment may last for about 1 to 7 days. Women with severe or recurrent infections may need longer courses of treatment.  Home care    If you re prescribed medicine, be sure to use it as directed. Finish all of the medicine, even if your symptoms go away. Note: Don t try to treat yourself using over-the-counter products without talking to your provider first. He or she will let you know if this is a good option for you.    Ask your provider what steps you can take to help reduce your risk of having a yeast infection in the future.  Follow-up care  Follow up with your  healthcare provider, or as directed.  When to seek medical advice  Call your healthcare provider right away if:    You have a fever of 100.4 F (38 C) or higher, or as directed by your provider.    Your symptoms worsen, or they don t go away within a few days of starting treatment.    You have new pain in the lower belly or pelvic region.    You have side effects that bother you or a reaction to the cream or pills you re prescribed.    You or any partners you have sex with have new symptoms, such as a rash, joint pain, or sores.  Date Last Reviewed: 10/1/2017    8548-2661 [employer] [address] [place] [address]. All rights reserved. This information is not intended as a substitute for professional medical care. Always follow your healthcare professional's instructions.          Preventing Vaginitis     Use mild, unscented soap when you bathe or shower to avoid irritating your vagina.    Vaginitis is irritation or infection of the vagina or vulva (the outside opening of the vagina). Vaginitis can be caused by bacteria, viruses, parasites, or yeast. Chemicals (such as in perfumes or soaps or in spermicides) can sometimes be a cause. Vaginitis can be caused by hormone changes in pregnancy or with menopause. You can help prevent vaginitis. Follow the tips below. And see your healthcare provider if you have any symptoms.  Hygiene    Avoid chemicals. Do not use vaginal sprays. Do not use scented toilet paper or tampons that are scented. Sprays and scents have chemicals that can irritate your vagina.    Do not douche unless you are told to by your healthcare provider. Douching is rarely needed. And it upsets the normal balance in the vagina.    Wash yourself well. Wash the outer vaginal area (vulva) every day with mild, unscented soap. Keep it as dry as possible.    Wipe correctly. Make sure to wipe from front to back after a bowel movement. This helps keep from spreading bacteria from your anus  to your vagina.    Change your tampon often. During your period, make sure to change your tampon as often as directed on the package. This allows the normal flow of vaginal discharge and blood.  Lifestyle    Limit your number of sexual partners. The more partners you have, the greater your risk of infection. Using condoms helps reduce your risk.    Get enough sleep. Sleep helps keep your body s immune system healthy. This helps you fight infection.    Lose weight, if needed. Excess weight can reduce air circulation around your vagina. This can increase your risk of infection.    Exercise regularly. Regular activity helps keep your body healthy.    Take antibiotics only as directed. Antibiotics can change the normal chemical balance in the vagina.    Clothing    Don t sit in wet clothes. Yeast thrives when it s warm and damp.    Don t wear tight pants. And don t wear tights, leggings, or hose without a cotton crotch. These types of clothing trap warmth and moisture.    Wear cotton underwear. Cotton lets air circulate around the vagina.  Symptoms of vaginitis    Irritation, swelling, or itching of the genital area    Vaginal discharge    Bad vaginal odor    Pain or burning during urination   Date Last Reviewed: 12/1/2016 2000-2019 [employer]. [address] [place] [address], [place] [address]. All rights reserved. This information is not intended as a substitute for professional medical care. Always follow your healthcare professional's instructions.

## 2020-07-02 DIAGNOSIS — G25.81 RESTLESS LEGS SYNDROME (RLS): ICD-10-CM

## 2020-07-03 RX ORDER — ROPINIROLE 5 MG/1
5 TABLET, FILM COATED ORAL AT BEDTIME
Qty: 30 TABLET | Refills: 0 | Status: SHIPPED | OUTPATIENT
Start: 2020-07-03 | End: 2020-07-30

## 2020-07-03 NOTE — TELEPHONE ENCOUNTER
"Routing refill request to provider for review/approval because:  Labs not current:  CBC, ALT      Requested Prescriptions   Pending Prescriptions Disp Refills     rOPINIRole (REQUIP) 5 MG tablet [Pharmacy Med Name: ROPINIROLE HCL 5 MG TABLET] 30 tablet 2     Sig: TAKE 1 TABLET BY MOUTH AT BEDTIME       Antiparkinson's Agents Protocol Failed - 7/3/2020  2:44 PM        Failed - CBC on record in past 12 months     Recent Labs   Lab Test 08/21/15  1426   WBC 7.7   RBC 4.68   HGB 13.3   HCT 41.1                    Failed - ALT on record in past 12 months         Recent Labs   Lab Test 01/31/18  1651   ALT 64*             Passed - Blood pressure under 140/90 in past 12 months     BP Readings from Last 3 Encounters:   02/14/20 135/78   01/15/20 113/66   09/11/19 134/77                 Passed - Serum Creatinine on file in past 12 months     Recent Labs   Lab Test 09/11/19  0843   CR 0.77       Ok to refill medication if creatinine is low          Passed - Medication is active on med list        Passed - Patient is age 18 or older        Passed - No active pregnancy on record        Passed - No positive pregnancy test in the past 12 months        Passed - Recent (6 mo) or future (30 days) visit within the authorizing provider's specialty     Patient had office visit in the last 6 months or has a visit in the next 30 days with authorizing provider or within the authorizing provider's specialty.  See \"Patient Info\" tab in inbasket, or \"Choose Columns\" in Meds & Orders section of the refill encounter.                     Parul Serrano RN, BSN, PHN    "

## 2020-07-26 DIAGNOSIS — G25.81 RESTLESS LEGS SYNDROME (RLS): ICD-10-CM

## 2020-07-29 NOTE — TELEPHONE ENCOUNTER
"Routing refill request to provider for review/approval because:  Labs not current:  CBC, ALT      Requested Prescriptions   Pending Prescriptions Disp Refills     rOPINIRole (REQUIP) 5 MG tablet [Pharmacy Med Name: ROPINIROLE HCL 5 MG TABLET] 30 tablet 0     Sig: TAKE 1 TABLET (5 MG) BY MOUTH AT BEDTIME NEEDS VISIT AND/OR LABS. CAN USE VIRTUAL VISIT.       Antiparkinson's Agents Protocol Failed - 7/29/2020  4:01 PM        Failed - CBC on record in past 12 months     Recent Labs   Lab Test 08/21/15  1426   WBC 7.7   RBC 4.68   HGB 13.3   HCT 41.1                    Failed - ALT on record in past 12 months         Recent Labs   Lab Test 01/31/18  1651   ALT 64*             Passed - Blood pressure under 140/90 in past 12 months     BP Readings from Last 3 Encounters:   02/14/20 135/78   01/15/20 113/66   09/11/19 134/77                 Passed - Serum Creatinine on file in past 12 months     Recent Labs   Lab Test 09/11/19  0843   CR 0.77       Ok to refill medication if creatinine is low          Passed - Medication is active on med list        Passed - Patient is age 18 or older        Passed - No active pregnancy on record        Passed - No positive pregnancy test in the past 12 months        Passed - Recent (6 mo) or future (30 days) visit within the authorizing provider's specialty     Patient had office visit in the last 6 months or has a visit in the next 30 days with authorizing provider or within the authorizing provider's specialty.  See \"Patient Info\" tab in inbasket, or \"Choose Columns\" in Meds & Orders section of the refill encounter.                   Parul Serrano RN, BSN, PHN    "

## 2020-07-30 ENCOUNTER — VIRTUAL VISIT (OUTPATIENT)
Dept: FAMILY MEDICINE | Facility: CLINIC | Age: 50
End: 2020-07-30
Payer: COMMERCIAL

## 2020-07-30 DIAGNOSIS — G25.81 RESTLESS LEGS SYNDROME (RLS): ICD-10-CM

## 2020-07-30 DIAGNOSIS — Z11.59 NEED FOR HEPATITIS B SCREENING TEST: ICD-10-CM

## 2020-07-30 DIAGNOSIS — I10 ESSENTIAL HYPERTENSION WITH GOAL BLOOD PRESSURE LESS THAN 140/90: ICD-10-CM

## 2020-07-30 DIAGNOSIS — Z12.11 SCREEN FOR COLON CANCER: ICD-10-CM

## 2020-07-30 DIAGNOSIS — K21.9 GASTROESOPHAGEAL REFLUX DISEASE, ESOPHAGITIS PRESENCE NOT SPECIFIED: Chronic | ICD-10-CM

## 2020-07-30 DIAGNOSIS — E11.65 TYPE 2 DIABETES MELLITUS WITH HYPERGLYCEMIA, WITHOUT LONG-TERM CURRENT USE OF INSULIN (H): Primary | ICD-10-CM

## 2020-07-30 PROCEDURE — 99214 OFFICE O/P EST MOD 30 MIN: CPT | Mod: 95 | Performed by: NURSE PRACTITIONER

## 2020-07-30 RX ORDER — ROPINIROLE 5 MG/1
5 TABLET, FILM COATED ORAL AT BEDTIME
Qty: 30 TABLET | Refills: 5 | Status: SHIPPED | OUTPATIENT
Start: 2020-07-30 | End: 2021-01-20

## 2020-07-30 RX ORDER — LOSARTAN POTASSIUM 100 MG/1
100 TABLET ORAL DAILY
Qty: 30 TABLET | Refills: 5 | Status: SHIPPED | OUTPATIENT
Start: 2020-07-30 | End: 2021-02-04

## 2020-07-30 ASSESSMENT — PATIENT HEALTH QUESTIONNAIRE - PHQ9: SUM OF ALL RESPONSES TO PHQ QUESTIONS 1-9: 2

## 2020-07-30 NOTE — PATIENT INSTRUCTIONS
At [facility], we strive to deliver an exceptional experience to you, every time we see you.  If you receive a survey in the mail, please send us back your thoughts. We really do value your feedback.    Based on your medical history, these are the current health maintenance/preventive care services that you are due for (some may have been done at this visit.)  Health Maintenance Due   Topic Date Due     EYE EXAM  1970     COLORECTAL CANCER SCREENING  02/25/1979     HIV SCREENING  02/25/1984     HEPATITIS B IMMUNIZATION (1 of 3 - Risk 3-dose series) 02/25/1988     BMP  03/11/2020     A1C  07/16/2020     ZOSTER IMMUNIZATION (2 of 2) 03/11/2020     PHQ-9  08/14/2020         Suggested websites for health information:  Www.DigiPath.[employer]icon : Up to date and easily searchable information on multiple topics.  Www.RelayFoods.gov : medication info, interactive tutorials, watch real surgeries online  Www.familydoctor.org : good info from the Academy of Family Physicians  Www.cdc.gov : public health info, travel advisories, epidemics (H1N1)  Www.aap.org : children's health info, normal development, vaccinations  Www.health.[facility].[place].us : [place] dept of health, public health issues in [place], N1N1    Your care team:                            Family Medicine Internal Medicine   MD Amarjit Ortega MD Shantel Branch-Fleming, MD Katya Georgiev PA-C Nam Ho, MD Pediatrics   KALI Monroe, MD Kayla Medina CNP, MD Deborah Mielke, MD Kim Thein, APRN CNP      Clinic hours: Monday - Thursday 7 am-7 pm; Fridays 7 am-5 pm.   Urgent care: Monday - Friday 11 am-9 pm; Saturday and Sunday 9 am-5 pm.  Pharmacy : Monday -Thursday 8 am-8 pm; Friday 8 am-6 pm; Saturday and Sunday 9 am-5 pm.     Clinic: [phone]   Pharmacy: [phone]    Patient Education     Diabetes: Learning About Serving and Portion Sizes     A good rule of  thumb: Devote half your plate to vegetables and green salad. Split the other half between protein and starchy carbohydrates. Fruit makes a good dessert.   Servings and portions. What s the difference? These terms can be very confusing. But learning to measure serving sizes can help you figure out how many carbohydrates ( carbs ) and other foods you eat each day. They are also powerful tools for managing your weight.  Servings and portions  Many different words are used to describe amounts of food. If your healthcare provider uses a term you re not sure of, don t be afraid to ask. It helps to know the difference between servings and portions:    A serving size is a fixed size. Food producers use this term to describe their products. For example, the label on a cereal box could say that 1 cup of dry cereal = 1 serving.    A portion (also called a  helping ) is how much you eat or how much you put on your plate at a meal. For example, you might eat 2 cups of cereal at breakfast.  Watching serving sizes  The portion you choose to eat (such as 2 cups of cereal) may be more than 1 serving as listed on the food label (such as 1 cup of cereal). That s why it helps to measure or weigh the food you eat. Because the food label values are based on servings, you ll need to know how many servings you eat at 1 sitting.  When you re planning for a snack or a meal, keep servings in mind. If you don t have measuring cups or a scale handy, there are ways to  eyeball  serving sizes, such as comparing your food to the size of your hand (see pictures above).       1 tablespoon is about the size of your thumb.  1 teaspoon is about the size of the tip of your thumb.  1 serving of meat or poultry is the size of the palm of your hand.   Managing portion sizes  If your weight is a concern, reducing your portions can help. You can eat more than one serving of a food at once. But to keep from eating too much at one meal, learn how to manage  your portions. A portion is the amount of each type of food on your plate.   Date Last Reviewed: 3/1/2016    1013-9564 [employer]. [address] [facility], [place] [address]. All rights reserved. This information is not intended as a substitute for professional medical care. Always follow your healthcare professional's instructions.           Patient Education     Diabetes: The Benefits of Exercise    Exercise can lower blood sugar, help control weight, and boost your mood. It can also improve blood flow, lower blood pressure, help you use oxygen better, and improve heart health. Even a small amount of regular activity can have a big impact on your health.  What can exercise help?    Blood sugar. Regular exercise improves blood sugar control by helping your body use insulin.    Mental and emotional health. Physical activity eases stress and helps you sleep better.    Heart health. With regular exercise, you can reduce your risk for heart disease and high blood pressure. You can also improve your cholesterol and triglyceride levels. Muscles after exercise are better able to use up any stored fat.    Weight. Exercise helps you lose fat, gain muscle, and control your weight.    Health of blood vessels and nerves. Activity helps lower blood sugar. This helps prevent damage to blood vessels and nerves that can cause problems with your brain, eyes, feet, and legs.    Finances. If you manage your blood sugar, you may spend less on health care.    2 types of exercise  Two types of exercise help your body use blood sugar. Experts advise both types of exercise for people with diabetes:    Aerobic exercise. This is a rhythmic, repeated, continued movement of large muscle groups for at least 10 minutes at a time. You should do this about 30 minutes a day on most days of the week. Examples include walking, biking, jogging, swimming, water aerobics, and many other sports.    Strength training. This is when you use  muscles to move weight or work against resistance. You can do it with free weights, machines, resistance tubing, or your own body weight. Aim for 2 to 3 sessions of resistance exercise each week. It s best to skip a day in between.  A goal to shoot for  Your main goal is to become more active. Even a little bit helps. Choose an activity that you like. Walking is one great form of exercise that everyone can do. Talk with your healthcare provider about any limits you may have before starting with an exercise program. Then aim for 150 minutes a week of physical activity. Don t let more than 2 days go by without exercise. When you are sitting for long periods of time, get up for short sessions of light activity every 30 minutes.  Adding activity to your day  Being more active doesn t have to be hard work. Try these to get more activity into your day:    Take the stairs instead of the elevator.    Garden, and do housework and yard work.    Choose a parking space farther from the store.    Walk to talk to a co-worker instead of calling.    Take a 10-minute walk around the block at lunch.    Walk to a bus stop a little farther from your home or office.    Walk the dog after dinner.  Date Last Reviewed: 9/1/2018 2000-2019 [employer]. [address] [place] [address], [place] [address]. All rights reserved. This information is not intended as a substitute for professional medical care. Always follow your healthcare professional's instructions.           Patient Education     Established High Blood Pressure    High blood pressure (hypertension) is a chronic disease. Often, healthcare providers don t know what causes it. But it can be caused by certain health conditions and medicines.  If you have high blood pressure, you may not have any symptoms. If you do have symptoms, they may include headache, dizziness, changes in your vision, chest pain, and shortness of breath. But even without symptoms, high blood pressure  that s not treated raises your risk for heart attack, heart failure, and stroke. High blood pressure is a serious health risk and shouldn t be ignored.  Blood pressure measurements are given as 2 numbers. Systolic blood pressure is the upper number. This is the pressure when the heart contracts. Diastolic blood pressure is the lower number. This is the pressure when the heart relaxes between beats. You will see your blood pressure readings written together. For example, a person with a systolic pressure of 118 and a diastolic pressure of 78 will have 118/78 written in the medical record.  Blood pressure is categorized as normal, elevated, or stage 1 or stage 2 high blood pressure:    Normal blood pressure is systolic of less than 120 and diastolic of less than 80 (120/80)    Elevated blood pressure is systolic of 120 to 129 and diastolic less than 80    Stage 1 high blood pressure is systolic is 130 to 139 or diastolic between 80 to 89    Stage 2 high blood pressure is when systolic is 140 or higher or the diastolic is 90 or higher  Home care  If you have high blood pressure, follow these home care guidelines to help lower your blood pressure. If you are taking medicines for high blood pressure, these methods may reduce or end your need for medicines in the future.    Start a weight-loss program if you are overweight.    Cut back on how much salt you get in your diet. Here s how to do this:  ? Don t eat foods that have a lot of salt. These include olives, pickles, smoked meats, and salted potato chips.  ? Don t add salt to your food at the table.  ? Use only small amounts of salt when cooking.    Start an exercise program. Talk with your healthcare provider about the type of exercise program that would be best for you. It doesn't have to be hard. Even brisk walking for 20 minutes 3 times a week is a good form of exercise.    Don t take medicines that stimulate the heart. This includes many over-the-counter cold and  sinus decongestant pills and sprays, as well as diet pills. Check the warnings about high blood pressure on the label. Before buying any over-the-counter medicines or supplements, always ask the pharmacist about the product's potential interaction with your high blood pressure and your high blood pressure medicines.    Stimulants such as amphetamine or cocaine could be deadly for someone with high blood pressure. Never take these.    Limit how much caffeine you get in your diet. Switch to caffeine-free products.    Stop smoking. If you are a long-time smoker, this can be hard. Talk to your healthcare provider about medicines and nicotine replacement options to help you. Also, enroll in a stop-smoking program to make it more likely that you will quit for good.    Learn how to handle stress. This is an important part of any program to lower blood pressure. Learn about relaxation methods like meditation, yoga, or biofeedback.    If your provider prescribed medicines, take them exactly as directed. Missing doses may cause your blood pressure get out of control.    If you miss a dose or doses, check with your healthcare provider or pharmacist about what to do.    Consider buying an automatic blood pressure machine to check your blood pressure at home. Ask your provider for a recommendation. You can get one of these at most pharmacies.     The American Heart Association recommends the following guidelines for home blood pressure monitoring:    Don't smoke or drink coffee for 30 minutes before taking your blood pressure.    Go to the bathroom before the test.    Relax for 5 minutes before taking the measurement.    Sit with your back supported (don't sit on a couch or soft chair); keep your feet on the floor uncrossed. Place your arm on a solid flat surface (like a table) with the upper part of the arm at heart level. Place the middle of the cuff directly above the bend of the elbow. Check the monitor's instruction manual  for an illustration.    Take multiple readings. When you measure, take 2 to 3 readings one minute apart and record all of the results.    Take your blood pressure at the same time every day, or as your healthcare provider recommends.    Record the date, time, and blood pressure reading.    Take the record with you to your next medical appointment. If your blood pressure monitor has a built-in memory, simply take the monitor with you to your next appointment.    Call your provider if you have several high readings. Don't be frightened by a single high blood pressure reading, but if you get several high readings, check in with your healthcare provider.    Note: When blood pressure reaches a systolic (top number) of 180 or higher OR diastolic (bottom number) of 110 or higher, seek emergency medical treatment.  Follow-up care  You will need to see your healthcare provider regularly. This is to check your blood pressure and to make changes to your medicines. Make a follow-up appointment as directed. Bring the record of your home blood pressure readings to the appointment.  When to seek medical advice  Call your healthcare provider right away if any of these occur:    Blood pressure reaches a systolic (upper number) of 180 or higher OR a diastolic (bottom number) of 110 or higher    Chest pain or shortness of breath    Severe headache    Throbbing or rushing sound in the ears    Nosebleed    Sudden severe pain in your belly (abdomen)    Extreme drowsiness, confusion, or fainting    Dizziness or spinning sensation (vertigo)    Weakness of an arm or leg or one side of the face    You have problems speaking or seeing   Date Last Reviewed: 12/1/2016 2000-2019 [employer]. [address] [place] [address], [place] [address]. All rights reserved. This information is not intended as a substitute for professional medical care. Always follow your healthcare professional's instructions.

## 2020-07-30 NOTE — PROGRESS NOTES
"Antonietta Christie is a 50 year old female who is being evaluated via a billable video visit.      The patient has been notified of following:     \"This video visit will be conducted via a call between you and your physician/provider. We have found that certain health care needs can be provided without the need for an in-person physical exam.  This service lets us provide the care you need with a video conversation.  If a prescription is necessary we can send it directly to your pharmacy.  If lab work is needed we can place an order for that and you can then stop by our lab to have the test done at a later time.    Video visits are billed at different rates depending on your insurance coverage.  Please reach out to your insurance provider with any questions.    If during the course of the call the physician/provider feels a video visit is not appropriate, you will not be charged for this service.\"    Patient has given verbal consent for Video visit? Yes  How would you like to obtain your AVS? MyChart  If you are dropped from the video visit, the video invite should be resent to: Text to cell phone: [phone]  Will anyone else be joining your video visit? No    Subjective     Antonietta Christie is a 50 year old female who presents today via video visit for the following health issues:    HPI    Diabetes Follow-up      How often are you checking your blood sugar? Not at all    What concerns do you have today about your diabetes? None     Do you have any of these symptoms? (Select all that apply)  No numbness or tingling in feet.  No redness, sores or blisters on feet.  No complaints of excessive thirst.  No reports of blurry vision.  No significant changes to weight.    Have you had a diabetic eye exam in the last 12 months? Yes- 12/2019  She thinks she lost her glucometer or packed it away and hasn't shala able to find it. Has not checked sugar in over a month      BP Readings from Last 2 Encounters:   02/14/20 135/78 "   01/15/20 113/66     Hemoglobin A1C (%)   Date Value   01/16/2020 6.0 (H)   09/11/2019 6.5 (H)     LDL Cholesterol Calculated (mg/dL)   Date Value   01/16/2020 76   01/25/2019 60               Hypertension Follow-up      Do you check your blood pressure regularly outside of the clinic? No     Are you following a low salt diet? Yes    Are your blood pressures ever more than 140 on the top number (systolic) OR more   than 90 on the bottom number (diastolic), for example 140/90? No      How many servings of fruits and vegetables do you eat daily?  4 or more    On average, how many sweetened beverages do you drink each day (Examples: soda, juice, sweet tea, etc.  Do NOT count diet or artificially sweetened beverages)?   0    How many days per week do you exercise enough to make your heart beat faster? 3 or less    How many minutes a day do you exercise enough to make your heart beat faster? 9 or less    How many days per week do you miss taking your medication? 0    Medication Followup of Ropinrole    Taking Medication as prescribed: yes    Side Effects:  None    Medication Helping Symptoms:  yes       Video Start Time: 8:41 AM      Patient Active Problem List   Diagnosis     GERD (gastroesophageal reflux disease)     CARDIOVASCULAR SCREENING; LDL GOAL LESS THAN 100     Bunion of great toe of left foot     Insomnia     Restless legs syndrome (RLS)     Peripheral edema     Carpal tunnel syndrome     Essential hypertension with goal blood pressure less than 140/90     Seborrheic keratosis     CHELO (obstructive sleep apnea)- Moderate (AHI 26)     Type 2 diabetes mellitus with hyperglycemia, without long-term current use of insulin (H)     Morbid obesity with BMI of 50.0-59.9, adult (H)     Past Surgical History:   Procedure Laterality Date     TONSILLECTOMY & ADENOIDECTOMY  1979       Social History     Tobacco Use     Smoking status: Never Smoker     Smokeless tobacco: Never Used   Substance Use Topics     Alcohol use:  Yes     Comment: occasional     Family History   Problem Relation Age of Onset     Hyperlipidemia Father      Family History Negative No family hx of          Current Outpatient Medications   Medication Sig Dispense Refill     ASPIRIN LOW DOSE 81 MG EC tablet TAKE 1 TABLET BY MOUTH DAILY 30 tablet 11     atorvastatin (LIPITOR) 10 MG tablet TAKE 1 TABLET BY MOUTH EVERY DAY 30 tablet 11     gabapentin (NEURONTIN) 300 MG capsule Take one capsule in the morning and 2 at night by mouth 270 capsule 3     ketoconazole (NIZORAL) 2 % external shampoo Apply topically daily as needed for itching or irritation 120 mL 3     losartan (COZAAR) 100 MG tablet TAKE 1 TABLET BY MOUTH EVERY DAY 30 tablet 5     metFORMIN (GLUCOPHAGE-XR) 500 MG 24 hr tablet Take 1 tablet (500 mg) by mouth daily (with dinner) 90 tablet 3     omeprazole (PRILOSEC) 20 MG DR capsule Take 1 capsule (20 mg) by mouth daily 90 capsule 3     order for DME Equipment being ordered: Glucometer per insurance preference, lancets, test strips.  Patient to check sugars 2 times daily, 90 day supply for test strips and lancets, refill 3 times 1 Device 3     ORDER FOR DME Resmed S9 Auto CPAP 8-12cm H2O, [place] [employer] for her small nasal mask       rOPINIRole (REQUIP) 5 MG tablet Take 1 tablet (5 mg) by mouth At Bedtime Needs visit and/or labs, which can be in person but a virtual visit may be acceptable, for more. 30 tablet 0     BP Readings from Last 3 Encounters:   02/14/20 135/78   01/15/20 113/66   09/11/19 134/77    Wt Readings from Last 3 Encounters:   02/14/20 (!) 158.8 kg (350 lb)   01/15/20 (!) 156.9 kg (345 lb 12.8 oz)   09/11/19 (!) 165.3 kg (364 lb 6.4 oz)                    Reviewed and updated as needed this visit by Provider         Review of Systems   Constitutional, HEENT, cardiovascular, pulmonary, gi and gu systems are negative, except as otherwise noted.      Objective             Physical Exam     GENERAL: Healthy, alert and no distress  EYES: Eyes grossly  normal to inspection.  No discharge or erythema, or obvious scleral/conjunctival abnormalities.  RESP: No audible wheeze, cough, or visible cyanosis.  No visible retractions or increased work of breathing.    SKIN: Visible skin clear. No significant rash, abnormal pigmentation or lesions.  NEURO: Cranial nerves grossly intact.  Mentation and speech appropriate for age.  PSYCH: Mentation appears normal, affect normal/bright, judgement and insight intact, normal speech and appearance well-groomed.      Diagnostic Test Results:  Labs reviewed in Epic  No results found for any visits on 07/30/20.        Assessment & Plan     1. Type 2 diabetes mellitus with hyperglycemia, without long-term current use of insulin (H)  Rx for new glucometer, continue on Metformin 500 mg daily for now, adjust dose as indicated, offered CDE referral but she declined for now  - HEMOGLOBIN A1C; Future  - order for DME; Equipment being ordered: Glucometer per insurance preference, lancets, test strips.  Patient to check sugars 2 times daily, 90 day supply for test strips and lancets, refill 3 times  Dispense: 1 Device; Refill: 3    2. Essential hypertension with goal blood pressure less than 140/90  Prescription for home BP monitor as well, reviewed BP goal of <140/90  - Home Blood Pressure Monitor Order for DME - ONLY FOR DME; Future  - losartan (COZAAR) 100 MG tablet; Take 1 tablet (100 mg) by mouth daily  Dispense: 30 tablet; Refill: 5    3. Restless legs syndrome (RLS)  Refill Requip which is working very well fo rher RLS symptoms, no adverse effects.  - rOPINIRole (REQUIP) 5 MG tablet; Take 1 tablet (5 mg) by mouth At Bedtime Needs visit and/or labs, which can be in person but a virtual visit may be acceptable, for more.  Dispense: 30 tablet; Refill: 5    4. Gastroesophageal reflux disease, esophagitis presence not specified  Refilled Prilosec, discussed GERD trigger/avoidance, benefits of weight loss.  - omeprazole (PRILOSEC) 20 MG   "capsule; Take 1 capsule (20 mg) by mouth daily  Dispense: 90 capsule; Refill: 3    5.  Need for hepatitis B screening test  Patient reports history of autoimmune hepatitis about 25 years ago.  She is unsure if she ever received Hep B series.  - Hepatitis B core antibody; Future  - Hepatitis B surface antigen; Future  - Hepatitis B Surface Antibody; Future  - **Comprehensive metabolic panel FUTURE anytime; Future    6. Screen for colon cancer    - Fecal colorectal cancer screen FIT - Future (S+30); Future     BMI:   Estimated body mass index is 51.69 kg/m  as calculated from the following:    Height as of 2/14/20: 1.753 m (5' 9\").    Weight as of 2/14/20: 158.8 kg (350 lb).   Weight management plan: Discussed healthy diet and exercise guidelines        Work on weight loss  Regular exercise  See Patient Instructions    No follow-ups on file.    RONY Kim CNP  [facility]      Video-Visit Details    Type of service:  Video Visit    Video End Time:8:59 AM    Originating Location (pt. Location): Home    Distant Location (provider location):  [facility]     Platform used for Video Visit: Zechariah    No follow-ups on file.       RONY Kim CNP        "

## 2020-07-31 RX ORDER — ROPINIROLE 5 MG/1
TABLET, FILM COATED ORAL
Qty: 30 TABLET | Refills: 0 | OUTPATIENT
Start: 2020-07-31

## 2020-09-22 ENCOUNTER — TELEPHONE (OUTPATIENT)
Dept: FAMILY MEDICINE | Facility: CLINIC | Age: 50
End: 2020-09-22

## 2020-09-22 NOTE — TELEPHONE ENCOUNTER
Plan does not cover this medication. Please call plan at [phone] to initiate prior authorization or call/fax pharmacy to change medication at [phone]. Patient ID # is [id number].              [facility]

## 2020-09-23 NOTE — TELEPHONE ENCOUNTER
Antonietta  returned call    Best number to reach caller: Home number on file [phone] (home)    Is it ok to leave a detailed message: YES

## 2020-09-23 NOTE — TELEPHONE ENCOUNTER
This writer attempted to contact patient on 09/23/20      Reason for call medication and left message.      If patient calls back:   2nd floor [facility] Team (MA/TC) called. Inform patient that someone from the team will contact them, document that pt called and route to care team.         Amanda Fenton MA

## 2020-10-05 NOTE — TELEPHONE ENCOUNTER
This writer attempted to contact patient on 10/05/20      Reason for call need to purchase over the counter and left detailed message.      If patient calls back:   2nd floor [facility] Team (MA/TC) called. Inform patient that someone from the team will contact them, document that pt called and route to care team.         Annelise Solis MA

## 2020-12-28 ENCOUNTER — TRANSFERRED RECORDS (OUTPATIENT)
Dept: MULTI SPECIALTY CLINIC | Facility: CLINIC | Age: 50
End: 2020-12-28

## 2020-12-28 LAB — RETINOPATHY: NORMAL

## 2021-01-15 ENCOUNTER — HEALTH MAINTENANCE LETTER (OUTPATIENT)
Age: 51
End: 2021-01-15

## 2021-01-16 DIAGNOSIS — G25.81 RESTLESS LEGS SYNDROME (RLS): ICD-10-CM

## 2021-01-18 DIAGNOSIS — E11.65 TYPE 2 DIABETES MELLITUS WITH HYPERGLYCEMIA, WITHOUT LONG-TERM CURRENT USE OF INSULIN (H): ICD-10-CM

## 2021-01-18 NOTE — TELEPHONE ENCOUNTER
Routing refill request to provider for review/approval because:  Lynette given x1 and patient did not follow up, please advise    Trinity Rudd RN, BSN, CMSRN  [facility]

## 2021-01-19 RX ORDER — METFORMIN HCL 500 MG
TABLET, EXTENDED RELEASE 24 HR ORAL
Qty: 30 TABLET | Refills: 2 | Status: SHIPPED | OUTPATIENT
Start: 2021-01-19 | End: 2021-02-05 | Stop reason: DRUGHIGH

## 2021-01-19 NOTE — TELEPHONE ENCOUNTER
Medication is being filled for 1 time refill only due to:  Lynette given **Pt needs follow-up/labs for further refills**    Trinity Rudd RN, BSN, CMSRN  [facility]

## 2021-01-20 RX ORDER — ROPINIROLE 5 MG/1
TABLET, FILM COATED ORAL
Qty: 30 TABLET | Refills: 0 | Status: SHIPPED | OUTPATIENT
Start: 2021-01-20 | End: 2021-02-04

## 2021-01-24 ENCOUNTER — HEALTH MAINTENANCE LETTER (OUTPATIENT)
Age: 51
End: 2021-01-24

## 2021-02-02 ENCOUNTER — VIRTUAL VISIT (OUTPATIENT)
Dept: FAMILY MEDICINE | Facility: CLINIC | Age: 51
End: 2021-02-02
Payer: COMMERCIAL

## 2021-02-02 ENCOUNTER — MYC MEDICAL ADVICE (OUTPATIENT)
Dept: FAMILY MEDICINE | Facility: CLINIC | Age: 51
End: 2021-02-02

## 2021-02-02 VITALS — WEIGHT: 293 LBS | HEIGHT: 69 IN | BODY MASS INDEX: 43.4 KG/M2

## 2021-02-02 DIAGNOSIS — B37.31 YEAST INFECTION OF THE VAGINA: Primary | ICD-10-CM

## 2021-02-02 PROCEDURE — 99213 OFFICE O/P EST LOW 20 MIN: CPT | Mod: TEL | Performed by: PHYSICIAN ASSISTANT

## 2021-02-02 RX ORDER — FLUCONAZOLE 150 MG/1
150 TABLET ORAL ONCE
Qty: 2 TABLET | Refills: 0 | Status: SHIPPED | OUTPATIENT
Start: 2021-02-02 | End: 2021-02-02

## 2021-02-02 ASSESSMENT — MIFFLIN-ST. JEOR: SCORE: 2403.05

## 2021-02-02 NOTE — PROGRESS NOTES
"Antonietta is a 50 year old who is being evaluated via a billable telephone visit.      What phone number would you like to be contacted at? [phone]  How would you like to obtain your AVS? MyChart  Assessment & Plan   Problem List Items Addressed This Visit     None      Visit Diagnoses     Yeast infection of the vagina    -  Primary    Relevant Medications    fluconazole (DIFLUCAN) 150 MG tablet               Diflucan 150 mg once, may repeat in 3 days as needed             6 minutes spent on the date of the encounter doing patient visit and documentation          BMI:   Estimated body mass index is 56.12 kg/m  as calculated from the following:    Height as of this encounter: 1.753 m (5' 9\").    Weight as of this encounter: 172.4 kg (380 lb).             Return in about 1 week (around 2/9/2021), or if symptoms worsen or fail to improve.    KALI Hernandez [facility] OMER Mane is a 50 year old who presents to clinic today for the following health issues     HPI       Vaginal Symptoms  Onset/Duration: x 1 week   Description:  Vaginal Discharge: clear   Itching (Pruritis): YES  Burning sensation:  YES  Odor: no  Accompanying Signs & Symptoms:  Urinary symptoms: no  Abdominal pain: no  Fever: no  History:   Sexually active: no  New Partner: no  Possibility of Pregnancy:  no  Recent antibiotic use: no  Previous vaginitis issues: no  Precipitating or alleviating factors: none   Therapies tried and outcome: generic store brand vaginal insert, topical solution for itching           Review of Systems   Constitutional, HEENT, cardiovascular, pulmonary, gi and gu systems are negative, except as otherwise noted.      Objective           Vitals:  No vitals were obtained today due to virtual visit.    Physical Exam   healthy, alert and no distress  PSYCH: Alert and oriented times 3; coherent speech, normal   rate and volume, able to articulate logical thoughts, able   to " abstract reason, no tangential thoughts, no hallucinations   or delusions  Her affect is normal  RESP: No cough, no audible wheezing, able to talk in full sentences  Remainder of exam unable to be completed due to telephone visits                Phone call duration: 6 minutes

## 2021-02-02 NOTE — TELEPHONE ENCOUNTER
E-visit/Virtual visit/Telephone visit instructions given to Antonietta to further discuss possible yeast infection.     Parul Serrano RN, BSN, PHN

## 2021-02-04 ENCOUNTER — OFFICE VISIT (OUTPATIENT)
Dept: FAMILY MEDICINE | Facility: CLINIC | Age: 51
End: 2021-02-04
Payer: COMMERCIAL

## 2021-02-04 ENCOUNTER — TELEPHONE (OUTPATIENT)
Dept: FAMILY MEDICINE | Facility: CLINIC | Age: 51
End: 2021-02-04

## 2021-02-04 VITALS
RESPIRATION RATE: 16 BRPM | SYSTOLIC BLOOD PRESSURE: 136 MMHG | HEART RATE: 94 BPM | HEIGHT: 69 IN | WEIGHT: 293 LBS | OXYGEN SATURATION: 95 % | BODY MASS INDEX: 43.4 KG/M2 | TEMPERATURE: 98.6 F | DIASTOLIC BLOOD PRESSURE: 88 MMHG

## 2021-02-04 DIAGNOSIS — Z23 NEED FOR IMMUNIZATION AGAINST INFLUENZA: ICD-10-CM

## 2021-02-04 DIAGNOSIS — I10 ESSENTIAL HYPERTENSION WITH GOAL BLOOD PRESSURE LESS THAN 140/90: ICD-10-CM

## 2021-02-04 DIAGNOSIS — Z23 NEED FOR SHINGLES VACCINE: ICD-10-CM

## 2021-02-04 DIAGNOSIS — E11.65 TYPE 2 DIABETES MELLITUS WITH HYPERGLYCEMIA, WITHOUT LONG-TERM CURRENT USE OF INSULIN (H): ICD-10-CM

## 2021-02-04 DIAGNOSIS — Z11.59 NEED FOR HEPATITIS C SCREENING TEST: ICD-10-CM

## 2021-02-04 DIAGNOSIS — Z12.31 ENCOUNTER FOR SCREENING MAMMOGRAM FOR BREAST CANCER: ICD-10-CM

## 2021-02-04 DIAGNOSIS — K21.9 GASTROESOPHAGEAL REFLUX DISEASE, UNSPECIFIED WHETHER ESOPHAGITIS PRESENT: Chronic | ICD-10-CM

## 2021-02-04 DIAGNOSIS — G25.81 RESTLESS LEGS SYNDROME (RLS): ICD-10-CM

## 2021-02-04 DIAGNOSIS — Z11.4 SCREENING FOR HIV (HUMAN IMMUNODEFICIENCY VIRUS): ICD-10-CM

## 2021-02-04 DIAGNOSIS — Z00.00 ROUTINE GENERAL MEDICAL EXAMINATION AT A HEALTH CARE FACILITY: Primary | ICD-10-CM

## 2021-02-04 DIAGNOSIS — E78.5 HYPERLIPIDEMIA LDL GOAL <100: ICD-10-CM

## 2021-02-04 DIAGNOSIS — G47.33 OSA (OBSTRUCTIVE SLEEP APNEA): Chronic | ICD-10-CM

## 2021-02-04 LAB
ANION GAP SERPL CALCULATED.3IONS-SCNC: 4 MMOL/L (ref 3–14)
BUN SERPL-MCNC: 12 MG/DL (ref 7–30)
CALCIUM SERPL-MCNC: 8.9 MG/DL (ref 8.5–10.1)
CHLORIDE SERPL-SCNC: 104 MMOL/L (ref 94–109)
CHOLEST SERPL-MCNC: 128 MG/DL
CO2 SERPL-SCNC: 28 MMOL/L (ref 20–32)
CREAT SERPL-MCNC: 0.66 MG/DL (ref 0.52–1.04)
CREAT UR-MCNC: 163 MG/DL
ERYTHROCYTE [DISTWIDTH] IN BLOOD BY AUTOMATED COUNT: 13 % (ref 10–15)
GFR SERPL CREATININE-BSD FRML MDRD: >90 ML/MIN/{1.73_M2}
GLUCOSE SERPL-MCNC: 234 MG/DL (ref 70–99)
HBA1C MFR BLD: 9.2 % (ref 0–5.6)
HCT VFR BLD AUTO: 42.5 % (ref 35–47)
HDLC SERPL-MCNC: 41 MG/DL
HGB BLD-MCNC: 13.4 G/DL (ref 11.7–15.7)
LDLC SERPL CALC-MCNC: 64 MG/DL
MCH RBC QN AUTO: 28.4 PG (ref 26.5–33)
MCHC RBC AUTO-ENTMCNC: 31.5 G/DL (ref 31.5–36.5)
MCV RBC AUTO: 90 FL (ref 78–100)
MICROALBUMIN UR-MCNC: 42 MG/L
MICROALBUMIN/CREAT UR: 26.01 MG/G CR (ref 0–25)
NONHDLC SERPL-MCNC: 87 MG/DL
PLATELET # BLD AUTO: 216 10E9/L (ref 150–450)
POTASSIUM SERPL-SCNC: 4.4 MMOL/L (ref 3.4–5.3)
RBC # BLD AUTO: 4.72 10E12/L (ref 3.8–5.2)
SODIUM SERPL-SCNC: 136 MMOL/L (ref 133–144)
TRIGL SERPL-MCNC: 117 MG/DL
WBC # BLD AUTO: 5 10E9/L (ref 4–11)

## 2021-02-04 PROCEDURE — 99214 OFFICE O/P EST MOD 30 MIN: CPT | Mod: 25 | Performed by: NURSE PRACTITIONER

## 2021-02-04 PROCEDURE — 83036 HEMOGLOBIN GLYCOSYLATED A1C: CPT | Performed by: NURSE PRACTITIONER

## 2021-02-04 PROCEDURE — 90750 HZV VACC RECOMBINANT IM: CPT | Performed by: NURSE PRACTITIONER

## 2021-02-04 PROCEDURE — 82043 UR ALBUMIN QUANTITATIVE: CPT | Performed by: NURSE PRACTITIONER

## 2021-02-04 PROCEDURE — 80048 BASIC METABOLIC PNL TOTAL CA: CPT | Performed by: NURSE PRACTITIONER

## 2021-02-04 PROCEDURE — 99396 PREV VISIT EST AGE 40-64: CPT | Mod: 25 | Performed by: NURSE PRACTITIONER

## 2021-02-04 PROCEDURE — 87389 HIV-1 AG W/HIV-1&-2 AB AG IA: CPT | Performed by: NURSE PRACTITIONER

## 2021-02-04 PROCEDURE — 36415 COLL VENOUS BLD VENIPUNCTURE: CPT | Performed by: NURSE PRACTITIONER

## 2021-02-04 PROCEDURE — 99207 PR FOOT EXAM NO CHARGE: CPT | Mod: 25 | Performed by: NURSE PRACTITIONER

## 2021-02-04 PROCEDURE — 80061 LIPID PANEL: CPT | Performed by: NURSE PRACTITIONER

## 2021-02-04 PROCEDURE — 86803 HEPATITIS C AB TEST: CPT | Performed by: NURSE PRACTITIONER

## 2021-02-04 PROCEDURE — 90471 IMMUNIZATION ADMIN: CPT | Performed by: NURSE PRACTITIONER

## 2021-02-04 PROCEDURE — 85027 COMPLETE CBC AUTOMATED: CPT | Performed by: NURSE PRACTITIONER

## 2021-02-04 RX ORDER — ROPINIROLE 5 MG/1
TABLET, FILM COATED ORAL
Qty: 30 TABLET | Refills: 11 | Status: SHIPPED | OUTPATIENT
Start: 2021-02-04 | End: 2022-01-31

## 2021-02-04 RX ORDER — ATORVASTATIN CALCIUM 10 MG/1
10 TABLET, FILM COATED ORAL DAILY
Qty: 30 TABLET | Refills: 11 | Status: SHIPPED | OUTPATIENT
Start: 2021-02-04 | End: 2021-12-28

## 2021-02-04 RX ORDER — LOSARTAN POTASSIUM 100 MG/1
100 TABLET ORAL DAILY
Qty: 30 TABLET | Refills: 5 | Status: SHIPPED | OUTPATIENT
Start: 2021-02-04 | End: 2021-09-13

## 2021-02-04 ASSESSMENT — MIFFLIN-ST. JEOR: SCORE: 2434.8

## 2021-02-04 ASSESSMENT — PAIN SCALES - GENERAL: PAINLEVEL: NO PAIN (0)

## 2021-02-04 NOTE — NURSING NOTE
Prior to immunization administration, verified patients identity using patient s name and date of birth. Please see Immunization Activity for additional information.     Screening Questionnaire for Adult Immunization    Are you sick today?   No   Do you have allergies to medications, food, a vaccine component or latex?   No   Have you ever had a serious reaction after receiving a vaccination?   No   Do you have a long-term health problem with heart, lung, kidney, or metabolic disease (e.g., diabetes), asthma, a blood disorder, no spleen, complement component deficiency, a cochlear implant, or a spinal fluid leak?  Are you on long-term aspirin therapy?   Yes   Do you have cancer, leukemia, HIV/AIDS, or any other immune system problem?   No   Do you have a parent, brother, or sister with an immune system problem?   No   In the past 3 months, have you taken medications that affect  your immune system, such as prednisone, other steroids, or anticancer drugs; drugs for the treatment of rheumatoid arthritis, Crohn s disease, or psoriasis; or have you had radiation treatments?   No   Have you had a seizure, or a brain or other nervous system problem?   No   During the past year, have you received a transfusion of blood or blood    products, or been given immune (gamma) globulin or antiviral drug?   No   For women: Are you pregnant or is there a chance you could become       pregnant during the next month?   No   Have you received any vaccinations in the past 4 weeks?   Yes, flu shot 1/21/21     Immunization questionnaire was positive for at least one answer.  Notified Freya Painter.        Per orders of Freya Painter, injection of Shingrix given by Seng Batista. Patient instructed to remain in clinic for 15 minutes afterwards, and to report any adverse reaction to me immediately.       Screening performed by Seng Batista on 2/4/2021

## 2021-02-04 NOTE — PATIENT INSTRUCTIONS
Preventive Health Recommendations  Female Ages 50 - 64    Yearly exam: See your health care provider every year in order to  o Review health changes.   o Discuss preventive care.    o Review your medicines if your doctor has prescribed any.      Get a Pap test every three years (unless you have an abnormal result and your provider advises testing more often).    If you get Pap tests with HPV test, you only need to test every 5 years, unless you have an abnormal result.     You do not need a Pap test if your uterus was removed (hysterectomy) and you have not had cancer.    You should be tested each year for STDs (sexually transmitted diseases) if you're at risk.     Have a mammogram every 1 to 2 years.    Have a colonoscopy at age 50, or have a yearly FIT test (stool test). These exams screen for colon cancer.      Have a cholesterol test every 5 years, or more often if advised.    Have a diabetes test (fasting glucose) every three years. If you are at risk for diabetes, you should have this test more often.     If you are at risk for osteoporosis (brittle bone disease), think about having a bone density scan (DEXA).    Shots: Get a flu shot each year. Get a tetanus shot every 10 years.    Nutrition:     Eat at least 5 servings of fruits and vegetables each day.    Eat whole-grain bread, whole-wheat pasta and brown rice instead of white grains and rice.    Get adequate Calcium and Vitamin D.     Lifestyle    Exercise at least 150 minutes a week (30 minutes a day, 5 days a week). This will help you control your weight and prevent disease.    Limit alcohol to one drink per day.    No smoking.     Wear sunscreen to prevent skin cancer.     See your dentist every six months for an exam and cleaning.    See your eye doctor every 1 to 2 years.  At [facility], we strive to deliver an exceptional experience to you, every time we see you. If you receive a survey, please complete it as we do  value your feedback.  If you have MyChart, you can expect to receive results automatically within 24 hours of their completion.  Your provider will send a note interpreting your results as well.   If you do not have MyChart, you should receive your results in about a week by mail.    Your care team:                            Family Medicine Internal Medicine   MD Amarjit Ortega MD Shantel Branch-Fleming, MD Srinivasa Vaka, MD Katya Belousova, KALI Rosario, APRN STEVE Osorio, MD Pediatrics   Izaiah Enriquez, KALI Silva, MD Bev Del Rio APRN CNP   MD Kayla Trinh MD Deborah Mielke, MD Kim Thein, APRN CNP      Clinic hours: Monday - Thursday 7 am-6 pm; Fridays 7 am-5 pm.   Urgent care: Monday - Friday 11 am-9 pm; Saturday and Sunday 9 am-5 pm.    Clinic: [phone]       [facility]: Monday - Thursday 8 am - 7 pm; Friday 8 am - 6 pm  [facility]: [phone]     Use [email or web address] for 24/7 diagnosis and treatment of dozens of conditions.    Patient Education     Prevention Guidelines, Women Ages 50 to 64  Screening tests and vaccines are an important part of managing your health. A screening test is done to find possible disorders or diseases in people who don't have any symptoms. The goal is to find a disease early so lifestyle changes can be made and you can be watched more closely to reduce the risk of disease, or to detect it early enough to treat it most effectively. Screening tests are not considered diagnostic, but are used to determine if more testing is needed. Health counseling is essential, too. Below are guidelines for these, for women ages 50 to 64. Keep in mind that screening advice varies among expert groups. Talk with your healthcare provider about which tests are best for you and to make sure you re up to date on what you need.  Screening Who needs it  How often   Type 2 diabetes or prediabetes All women beginning at age 45 and women without symptoms at any age who are overweight or obese and have 1 or more additional risk factors for diabetes. At  least every 3 years   Type 2 diabetes or prediabetes All women diagnosed with gestational diabetes Lifelong testing at least every 3 years   Type 2 diabetes All women with prediabetes Every year   Unhealthy alcohol use All women in this age group At routine exams   Blood pressure All women in this age group Yearly checkup if your blood pressure is normal  Normal blood pressure is less than 120/80 mm Hg  If your blood pressure reading is higher than normal, follow the advice of your healthcare provider   Breast cancer All women at average risk in this age group Yearly mammogram should be done until age 54. At age 55, you can switch to every other year or choose to continue yearly.  All women should know how their breasts normally look and feel and know the possible benefits and risks of breast cancer screening with mammograms.   Cervical cancer All women in this age group, except women who have had a complete hysterectomy Pap test every 3 years or Pap test with human papillomavirus (HPV) test every 5 years   Chlamydia Women who are sexually active and at increased risk for infection At yearly routine exams   Colorectal cancer All women at average risk in this age group Multiple tests are available and are used at different times. Possible tests include:    Flexible sigmoidoscopy every 5 years, or    Colonoscopy every 10 years, or    CT colonography (virtual colonoscopy) every 5 years, or    Yearly fecal occult blood test, or    Yearly fecal immunochemical test every year, or    Stool DNA test, every 3 years  If you choose a test other than a colonoscopy and have an abnormal test result, you will need to follow up with a colonoscopy. Screening advice varies among expert groups. Talk with your healthcare provider about  which tests are best for you.  Some people should be screened using a different schedule because of their personal or family health history. Talk with your healthcare provider about your health history.   Depression All women in this age group At routine exams   Gonorrhea Sexually active women at increased risk for infection At yearly routine exams   Hepatitis C Anyone at increased risk; 1 time for those born between 1945 and 1965 At routine exams   High cholesterol or triglycerides All women in this age group who are at risk for coronary artery disease At least every 5 years; talk with your healthcare provider about your risk   HIV All women At least once during your lifetime; yearly if at high risk   Lung cancer Women between the ages of 55 to 74 who are in fairly good health and are at higher risk for lung cancer       Currently smoke or have  quit within past 15 years       30-pack-year smoking history  , Eligibility criteria and age limit (possibly up to age 80) may vary across major organizations Yearly lung cancer screening with a low-dose CT scan (LDCT) Talk with your healthcare provider for more information.   Obesity All women in this age group At yearly routine exams   Osteoporosis Women who are postmenopausal Talk with your healthcare provider   Syphilis Women at increased risk for infection At routine exams; talk with your healthcare provider   Tuberculosis Women at increased risk for infection Talk with your healthcare provider   Vision All women in this age group Talk with your healthcare provider   Vaccine Who needs it How often   Chickenpox (varicella) All women in this age group who have no record of this infection or vaccine 2 doses; the second dose should be given at least 4 weeks after the first dose   Hepatitis A Women at increased risk for infection 2 or 3 doses (depending on the vaccine) given at least 6 months apart; talk with your healthcare provider   Hepatitis B Women at increased risk  for infection 2 or 3 doses (depending on the vaccine) ; second dose should be given 1 month after the first dose; if a third dose, it should be given at least 2 months after the second dose and at least 4 months after the first dose; talk with your healthcare provider   Haemophilus influenzae Type B (HIB) Women at increased risk for infection 1 or 3 doses; talk with your healthcare provider   Influenza (flu) All women in this age group Once a year   Measles, mumps, rubella (MMR) Women in this age group born in 1957 or later who have no record of these infections or vaccines 1 or 2doses   Meningococcal Women at increased risk for infection 1 or more doses; talk with your healthcare provider   Pneumococcal conjugate vaccine (PCV13) and pneumococcal polysaccharide vaccine (PPSV23) Women at increased risk for infection PCV13: 1 dose ages 19 to 64 (protects against 13 types of pneumococcal bacteria)  PPSV23: 1 or 2 doses through age 64(protects against 23 types of pneumococcal bacteria)  Talk with your healthcare provider   Tetanus/diphtheria/pertussis (Td/Tdap) booster All women in this age group Td every 10 years, or a 1-time dose of Tdap instead of a Td booster after age 18, then Td every 10 years   Zoster (Shingles) All women ages 50 and older 2 vaccines are available:       Recombinant zoster vaccine (RZV; Shingrix) is recommended as the preferred shingles vaccine. It's given in a series of 2 doses The 2nd dose is given 2 to 6 months after the first. This is given even if you've had shingles before or or had a previous Zoster live vaccine (ZVL; Zostavax).    Zoster live vaccine (ZVL; Zostavax) may be given to healthy adults over age 60. It's given in one dose      Counseling Who needs it How often   BRCA gene mutation testing for breast and ovarian cancer susceptibility Women with increased risk for having gene mutation When your risk is known; talk with your healthcare provider   Breast cancer and chemoprevention  Women at high risk for breast cancer When your risk is known; talk with your healthcare provider   Diet and exercise Women who are overweight or obese When diagnosed, and then at routine exams   Sexually transmitted infection prevention Women at increased risk for infection At routine exams; talk with your healthcare provider   Use of daily aspirin Women ages 50 and up who are at high risk for cardiovascular health problems and not at increased risk for bleeding as identified by their healthcare provider When your risk is known; talk with your healthcare provider   Use of tobacco and the health effects it can cause All women in this age group Every exam   [employer] last reviewed this educational content on 6/1/2020 2000-2020 [employer]. [address] [place] [address], [place] [address]. All rights reserved. This information is not intended as a substitute for professional medical care. Always follow your healthcare professional's instructions.           Patient Education     Diabetes: Keeping Feet Healthy    Diabetes can damage nerves in your feet and cause weakness, numbness, and pain (neuropathy). This makes it hard to feel injuries or sore spots. Diabetes can also change blood flow. This can make it harder for small problems, like a blister, to heal. In fact, small injuries can quickly become serious infections that send you to the hospital. Practice self-care to protect your feet and keep them healthy.    Take special care  These tips can help you care for your feet:    Check your feet daily for problems such as swelling, redness, and blisters. Also looks for cracks, dry skin, or numbness. Use a mirror to see the bottoms of your feet. Or, ask for help. Try to check your feet at the same time every day.    Manage your diabetes. Check and control your blood sugar. Take all your medicines as prescribed. Call your provider if you have trouble controlling your blood sugar.    Don't walk barefoot, even  indoors. Always wear socks inside your shoes.     Wash your feet with warm water and mild soap. Check the water temperature before putting your foot in the water. Dry well, especially between toes.    Don t treat in-grown toenails, corns, or calluses yourself. Talk with your healthcare provider or foot doctor (podiatrist) if you need help trimming your toenails. Ask your podiatrist for a foot care plan.    Use moisturizing cream or lotion if you have dry skin. But, don t use it between toes.    Don t use heating pads on your feet. If you have nerve damage (neuropathy), you could get a burn and not feel it.    Stop smoking. Smoking limits blood flow. It can make it harder for wounds to heal.    Don't use sharp blades to trim your nails. Use a nail clipper and file instead.   Have regular checkups  Foot problems can develop fast. So be sure to follow your healthcare team s schedule for checkups. During office visits, take off your shoes and socks as soon as you get in the exam room. Ask your provider to check your feet for problems. This will make it easier to find and treat small skin irritations before they get worse. Regular checkups can also help keep track of the blood flow and feeling in your feet. You may need to have checkups more often if you have neuropathy.   Wear correct footwear   Wearing correct footwear is very important. If areas of your feet have been damaged by too much pressure, your healthcare provider may advise changing your footwear. You may need to not wear high heels or tight work boots. Or, your healthcare provider may advise special shoes or inserts. These help protect your feet and keep existing problems from getting worse. If you need special footwear, ask your healthcare provider if you qualify for Medicare's custom-molded and extra-depth diabetic shoe and insert program.    Make sure shoes and socks fit   Any pair of shoes--new or old--should feel comfortable as soon as you put them on.  There shouldn t be any rubbing when you walk. Wear the right shoe for any activity. For instance, a running shoe is meant to keep your feet free from injury while you jog. Buy shoes at the end of the day, when your feet are larger. Check that they provide support without feeling too loose. Check that your socks fit, too. Wear soft, seamless, well-padded socks for activity. Cotton or microfiber socks are best to help to absorb sweat. To protect your feet, don't wear open-toed or open-heeled shoes. Talk with your healthcare team if you have questions about what kinds of shoes and socks are best   .  Get regular exercise  Regular exercise helps blood flow in your feet. It also helps make your feet stronger and more flexible. Gentle exercises such as walking or riding a stationary bike are best. You can also do special foot exercises. Talk with your healthcare provider before starting any exercise program. Also tell your provider if any exercise causes pain, redness, or other foot problems.      Note: If you have any kind of break in the skin of your foot or ankle, keep the area clean. Then call your healthcare provider. This is especially true if the area doesn t seem to be healing.   [employer] last reviewed this educational content on 11/1/2018 2000-2020 [employer]. [address] [place] [address]. All rights reserved. This information is not intended as a substitute for professional medical care. Always follow your healthcare professional's instructions.           Patient Education     Diabetes: Caring for Your Body    When you have diabetes, your body needs special care. This care helps you stay healthy and prevent problems. Exercise and healthy eating are a part of this. You can also protect yourself by taking special care of your feet, skin, and teeth.  Caring for your feet  Follow these tips to help keep your feet healthy:    Check your feet every day for redness, blisters, cracks, dry  skin, or numbness. Use a mirror to check the bottoms of your feet, if needed. Or ask for help.    Wash your feet in warm (not hot) water. Don t soak them.    Use an emery board to keep your toenails even with the ends of your toes. File away sharp edges. A foot doctor (podiatrist) may need to cut your toenails for you.    Smooth down calluses gently or wait until you next podiatry appointment.    Keep your skin soft and smooth by putting a thin layer of skin lotion on the tops and bottoms of your feet. Don't put lotion in between your toes.    Always wear shoes or slippers, even inside your home. Make sure that shoes are correctly fitted. Change your socks daily. Always check shoes for foreign objects before putting them on.    Call your healthcare provider right away if your feet are numb or painful. Also call your provider if a cut or sore doesn t heal in a few days.  Preventing skin infections  To prevent skin infections, bathe every day. Dry yourself well, especially between your toes. Wash any cuts with warm, soapy water. Cover with a sterile bandage. Call your healthcare provider if a cut or sore doesn't heal in a few days, feels warm, itches, is swollen, or has a bad smell.  Caring for your teeth  Follow these guidelines for healthy teeth:    Brush your teeth twice daily.    Floss your teeth daily.    See your dentist at least twice yearly.    Keep your blood sugar in a good range.  If you smoke, quit  Smoking is dangerous for everyone, especially people with diabetes. It can harm the blood vessels in your eyes, kidneys, nerves, and heart. It raises blood pressure. Smoking can also slow healing, so infections are more likely. Ask your healthcare provider about programs to help you stop smoking.  [employer] last reviewed this educational content on 4/1/2019 2000-2020 [employer]. [address], [place] [address]. All rights reserved. This information is not intended as a substitute for  professional medical care. Always follow your healthcare professional's instructions.           Patient Education     Diabetes: Exams and Tests    For your diabetes care, you may see your primary care provider or a specialist 2 to 4 times a year. This page lists some of the regular exams and tests advised for people with diabetes. To learn more, contact the American Diabetes Association ([phone] or www.diabetes.org).  Tests and vaccines  These should be done at least as often as stated below:    Blood pressure check. Every healthcare provider visit.    A1C. At first, every 3 months. If controlled, then every 3 to 6 months.     Cholesterol and blood lipid tests. At least every 12 months.    Urine tests for kidney function. Every 12 months.    Flu shots. Once a year.    Pneumonia shot. Ask your provider which pneumonia vaccines are right for you.    Hepatitis B shots. As soon as possible if you re younger than 60. Or as advised by your healthcare provider after age 60.    Shingles vaccine. After age 60. Get the shot even if you have had shingles. Or if you had a past shingles vaccine.    Other tests or vaccines. As advised by your provider.    Individualized medical nutrition therapy. At least once. Then as needed.    Stop smoking counseling. If you still smoke, at each visit.   Regular exams  The following exams help keep you healthy.  Foot exams  Nerve and blood vessel problems can affect your feet first. Have your healthcare provider check your feet at every office visit. Take your shoes and socks off in the exam room. This will help remind you to get your feet checked. Also check your feet at home every day. Look for pressure sores or injuries. Contact your provider if you see problems.  Eye exams  You can have problems with your eyes even if you don t have trouble seeing. An eye healthcare provider (ophthalmologist) or specially trained optometrist will give you a dilated eye exam at least once a year. Tell  your healthcare provider right away if you:    See dark spots    Don't see well in dim light    Have eye pain or pressure    Have any other problems  Dental exams  Gum disease (also called periodontal disease) and other mouth problems are common in people with diabetes. To help prevent these problems, see your dentist 2 or more times a year. Tell your dentist that you have diabetes.  Ask your healthcare provider what other exams you ll need on a regular basis.  [employer] last reviewed this educational content on 12/1/2018 2000-2020 [employer]. [address] [place] [address], [place] [address]. All rights reserved. This information is not intended as a substitute for professional medical care. Always follow your healthcare professional's instructions.

## 2021-02-04 NOTE — Clinical Note
Eye exam- pls abstract done Yes- Date of last eye exam: 12/28/20,  Location: [facility] in [place]

## 2021-02-04 NOTE — PROGRESS NOTES
SUBJECTIVE:   CC: Antonietta Christie is an 50 year old woman who presents for preventive health visit.       Patient has been advised of split billing requirements and indicates understanding: Yes  Healthy Habits:    Do you get at least three servings of calcium containing foods daily (dairy, green leafy vegetables, etc.)? No     Amount of exercise or daily activities, outside of work: none     Problems taking medications regularly No    Medication side effects: No    Have you had an eye exam in the past two years? yes    Do you see a dentist twice per year? no  Do you have sleep apnea, excessive snoring or daytime drowsiness?yes, does have a CPAP but it is old, wondering if she can get a new machine and also a travel CPAP machine.    Diabetes Follow-up      How often are you checking your blood sugar? Not at all    What concerns do you have today about your diabetes? None     Do you have any of these symptoms? (Select all that apply)  No numbness or tingling in feet.  No redness, sores or blisters on feet.  No complaints of excessive thirst.  No reports of blurry vision.  No significant changes to weight.    Have you had a diabetic eye exam in the last 12 months? Yes- Date of last eye exam: 12/28/20,  Location: [facility] in [place]        BP Readings from Last 2 Encounters:   02/04/21 136/88   02/14/20 135/78     Hemoglobin A1C (%)   Date Value   01/16/2020 6.0 (H)   09/11/2019 6.5 (H)     LDL Cholesterol Calculated (mg/dL)   Date Value   01/16/2020 76   01/25/2019 60         Hypertension Follow-up      Do you check your blood pressure regularly outside of the clinic? No     Are you following a low salt diet? No    Are your blood pressures ever more than 140 on the top number (systolic) OR more   than 90 on the bottom number (diastolic), for example 140/90?    Today's PHQ-2 Score:   PHQ-2 ( 1999 Pfizer) 2/4/2021 9/11/2019   Q1: Little interest or pleasure in doing things 0 0   Q2: Feeling down, depressed or  hopeless 0 0   PHQ-2 Score 0 0   Q1: Little interest or pleasure in doing things - -   Q2: Feeling down, depressed or hopeless - -   PHQ-2 Score - -       Abuse: Current or Past(Physical, Sexual or Emotional)- No  Do you feel safe in your environment? Yes        Social History     Tobacco Use     Smoking status: Never Smoker     Smokeless tobacco: Never Used   Substance Use Topics     Alcohol use: Yes     Comment: occasional     If you drink alcohol do you typically have >3 drinks per day or >7 drinks per week? No                     Reviewed orders with patient.  Reviewed health maintenance and updated orders accordingly - Yes  Labs reviewed in EPIC  BP Readings from Last 3 Encounters:   02/04/21 136/88   02/14/20 135/78   01/15/20 113/66    Wt Readings from Last 3 Encounters:   02/04/21 (!) 175.5 kg (387 lb)   02/02/21 (!) 172.4 kg (380 lb)   02/14/20 (!) 158.8 kg (350 lb)                  Patient Active Problem List   Diagnosis     GERD (gastroesophageal reflux disease)     CARDIOVASCULAR SCREENING; LDL GOAL LESS THAN 100     Bunion of great toe of left foot     Insomnia     Restless legs syndrome (RLS)     Peripheral edema     Carpal tunnel syndrome     Essential hypertension with goal blood pressure less than 140/90     Seborrheic keratosis     CHELO (obstructive sleep apnea)- Moderate (AHI 26)     Type 2 diabetes mellitus with hyperglycemia, without long-term current use of insulin (H)     Morbid obesity with BMI of 50.0-59.9, adult (H)     Past Surgical History:   Procedure Laterality Date     TONSILLECTOMY & ADENOIDECTOMY  1979       Social History     Tobacco Use     Smoking status: Never Smoker     Smokeless tobacco: Never Used   Substance Use Topics     Alcohol use: Yes     Comment: occasional     Family History   Problem Relation Age of Onset     Hyperlipidemia Father      Family History Negative No family hx of            Breast CA Risk Screening:  No flowsheet data found.    Mammogram Screening:  Recommended annual mammography  Pertinent mammograms are reviewed under the imaging tab.    Pertinent mammograms are reviewed under the imaging tab.  History of abnormal Pap smear: NO - age 30-65 PAP every 5 years with negative HPV co-testing recommended     Reviewed and updated as needed this visit by clinical staff  Tobacco  Allergies  Meds   Med Hx  Surg Hx  Fam Hx  Soc Hx        Reviewed and updated as needed this visit by Provider                Past Medical History:   Diagnosis Date     Autoimmune hepatitis (H) 2000s    Hx of     Hypertension goal BP (blood pressure) < 140/90 8/13/2013     Insomnia      Major depression 8/7/2017     Obesity     RX w/ phentiramine & topiramate in 2011     CHELO (obstructive sleep apnea)- Moderate (AHI 26) 9/26/2013    Sleep Study 3/13 (367#)- AHI 26.2, RDI 30.8, Lo2 88%, TcCo2 levels serge from 40-41 to 51. Baseline ABG 7.41/38/91. PLMI 0. CPAP 9 cm effective including REM-supine. Max TcCO2 46, PLMI 1.0.        Peripheral edema 8/1/2012     Restless legs syndrome (RLS)      Type 2 diabetes, HbA1c goal < 7% (H) 7/1/2015      Past Surgical History:   Procedure Laterality Date     TONSILLECTOMY & ADENOIDECTOMY  1979       ROS:  CONSTITUTIONAL: NEGATIVE for fever, chills, change in weight  INTEGUMENTARU/SKIN: NEGATIVE for worrisome rashes, moles or lesions  EYES: NEGATIVE for vision changes or irritation  ENT: NEGATIVE for ear, mouth and throat problems  RESP: NEGATIVE for significant cough or SOB  BREAST: NEGATIVE for masses, tenderness or discharge  CV: NEGATIVE for chest pain, palpitations or peripheral edema  GI: NEGATIVE for nausea, abdominal pain, heartburn, or change in bowel habits  : NEGATIVE for unusual urinary or vaginal symptoms. Periods are regular.  MUSCULOSKELETAL: NEGATIVE for significant arthralgias or myalgia  NEURO: NEGATIVE for weakness, dizziness or paresthesias  ENDOCRINE: NEGATIVE for temperature intolerance, skin/hair changes  PSYCHIATRIC: NEGATIVE for  "changes in mood or affect    OBJECTIVE:   BP (!) 167/95 (BP Location: Left arm, Patient Position: Chair, Cuff Size: Adult Large)   Pulse 94   Temp 98.6  F (37  C) (Tympanic)   Resp 16   Ht 1.753 m (5' 9\")   Wt (!) 175.5 kg (387 lb)   SpO2 95%   BMI 57.15 kg/m    EXAM:  GENERAL: healthy, alert and no distress  EYES: Eyes grossly normal to inspection, PERRL and conjunctivae and sclerae normal  HENT: ear canals and TM's normal, nose and mouth without ulcers or lesions  NECK: no adenopathy, no asymmetry, masses, or scars and thyroid normal to palpation  RESP: lungs clear to auscultation - no rales, rhonchi or wheezes  BREAST: normal without masses, tenderness or nipple discharge and no palpable axillary masses or adenopathy  CV: regular rate and rhythm, normal S1 S2, no S3 or S4, no murmur, click or rub, no peripheral edema and peripheral pulses strong  ABDOMEN: soft, nontender, no hepatosplenomegaly, no masses and bowel sounds normal   (female): normal female external genitalia and speculum exam deferred  MS: no gross musculoskeletal defects noted, no edema  SKIN: no suspicious lesions or rashes  NEURO: Normal strength and tone, mentation intact and speech normal  PSYCH: mentation appears normal, affect normal/bright  LYMPH: no cervical, supraclavicular, axillary, or inguinal adenopathy    Diagnostic Test Results:  Labs reviewed in Epic  No results found for this or any previous visit (from the past 24 hour(s)).    ASSESSMENT/PLAN:   1. Routine general medical examination at a health care facility      2. Type 2 diabetes mellitus with hyperglycemia, without long-term current use of insulin (H)  Checking labs, adjust medications as indicated  - BASIC METABOLIC PANEL  - HEMOGLOBIN A1C  - Albumin Random Urine Quantitative with Creat Ratio  - losartan (COZAAR) 100 MG tablet; Take 1 tablet (100 mg) by mouth daily  Dispense: 30 tablet; Refill: 5  - atorvastatin (LIPITOR) 10 MG tablet; Take 1 tablet (10 mg) by mouth " daily  Dispense: 30 tablet; Refill: 11  - FOOT EXAM    3. Restless legs syndrome (RLS)  Stable on Requip,.  - rOPINIRole (REQUIP) 5 MG tablet; TAKE 1 TABLET (5 MG) BY MOUTH AT BEDTIME NEEDS VISIT AND/OR LABS.  Dispense: 30 tablet; Refill: 11  - CBC with platelets    4. Screening for HIV (human immunodeficiency virus)    - HIV Antigen Antibody Combo    5. Need for hepatitis C screening test    - Hepatitis C Screen Reflex to HCV RNA Quant and Genotype    6. CHELO (obstructive sleep apnea)- Moderate (AHI 26)  Referring to sleep center for possible equipment upgrade.  - SLEEP EVALUATION & MANAGEMENT REFERRAL - [facility] [place]  [phone] (Age 15 and up); Future    7. Gastroesophageal reflux disease, unspecified whether esophagitis present  Refilled Prilosedc discussed triggers/avoidance, encouraged weight loss, return to clinic if not improved, new, or worsening symptoms.   - omeprazole (PRILOSEC) 20 MG DR capsule; Take 1 capsule (20 mg) by mouth daily  Dispense: 90 capsule; Refill: 3    8. Essential hypertension with goal blood pressure less than 140/90  BP controlled, continue low salt diet, weight loss efforts, regular exercise.  - BASIC METABOLIC PANEL  - Albumin Random Urine Quantitative with Creat Ratio  - losartan (COZAAR) 100 MG tablet; Take 1 tablet (100 mg) by mouth daily  Dispense: 30 tablet; Refill: 5  - Home Blood Pressure Monitor Order for DME - ONLY FOR DME    9. Hyperlipidemia LDL goal <100    - Lipid panel reflex to direct LDL Fasting    10. Encounter for screening mammogram for breast cancer    - *MA Screening Digital Bilateral; Future    11. Need for shingles vaccine    - ZOSTER VACCINE RECOMBINANT ADJUVANTED IM NJX    12. Need for immunization against influenza    - C RIV4 (FLUBLOK) VACCINE RECOMBINANT DNA PRSRV ANTIBIO FREE, IM [05371]    Patient has been advised of split billing requirements and indicates understanding: Yes  COUNSELING:   Reviewed preventive health  "counseling, as reflected in patient instructions    Estimated body mass index is 57.15 kg/m  as calculated from the following:    Height as of this encounter: 1.753 m (5' 9\").    Weight as of this encounter: 175.5 kg (387 lb).    Weight management plan: Discussed healthy diet and exercise guidelines    She reports that she has never smoked. She has never used smokeless tobacco.      Counseling Resources:  ATP IV Guidelines  Pooled Cohorts Equation Calculator  Breast Cancer Risk Calculator  BRCA-Related Cancer Risk Assessment: FHS-7 Tool  FRAX Risk Assessment  ICSI Preventive Guidelines  Dietary Guidelines for Americans, 2010  USDA's MyPlate  ASA Prophylaxis  Lung CA Screening    RONY Kim [facility]  "

## 2021-02-04 NOTE — TELEPHONE ENCOUNTER
Plan does not cover omeprazole (PRILOSEC) 20 MG DR capsule.  Please call [phone] to initiate Prior Auth or change med.    Insurance type and ID number: ID# [id number]      Additional Information:     Gracie Sy

## 2021-02-05 ENCOUNTER — TELEPHONE (OUTPATIENT)
Dept: FAMILY MEDICINE | Facility: CLINIC | Age: 51
End: 2021-02-05

## 2021-02-05 DIAGNOSIS — E66.01 MORBID OBESITY WITH BMI OF 50.0-59.9, ADULT (H): Primary | ICD-10-CM

## 2021-02-05 DIAGNOSIS — E11.65 TYPE 2 DIABETES MELLITUS WITH HYPERGLYCEMIA, WITHOUT LONG-TERM CURRENT USE OF INSULIN (H): ICD-10-CM

## 2021-02-05 LAB
HCV AB SERPL QL IA: NONREACTIVE
HIV 1+2 AB+HIV1 P24 AG SERPL QL IA: NONREACTIVE

## 2021-02-05 RX ORDER — METFORMIN HCL 500 MG
TABLET, EXTENDED RELEASE 24 HR ORAL
Qty: 360 TABLET | Refills: 3 | Status: SHIPPED | OUTPATIENT
Start: 2021-02-05 | End: 2021-12-28

## 2021-02-05 NOTE — TELEPHONE ENCOUNTER
Diabetes Education Scheduling Outreach #1:    Call to patient to schedule. Left message with phone number to call to schedule.    Plan for 2nd outreach attempt within 1 week.    Mike Granda OnCall  Diabetes and Nutrition Scheduling

## 2021-02-05 NOTE — TELEPHONE ENCOUNTER
This writer attempted to contact patient on 02/05/21      Reason for call otc medication and left detailed message.      If patient calls back:   2nd floor [place] Care Team (MA/TC) called. Inform patient that someone from the team will contact them, document that pt called and route to care team.         Annelise Solis MA

## 2021-02-06 NOTE — TELEPHONE ENCOUNTER
exenatide ER (BYDUREON) 2 MG pen is not covered by patient's insurance. Please send alternative.            Jayson Faarax  [facility]

## 2021-02-10 ENCOUNTER — TELEPHONE (OUTPATIENT)
Dept: FAMILY MEDICINE | Facility: CLINIC | Age: 51
End: 2021-02-10

## 2021-02-10 RX ORDER — LIRAGLUTIDE 6 MG/ML
INJECTION SUBCUTANEOUS
Qty: 9 ML | Refills: 1 | Status: SHIPPED | OUTPATIENT
Start: 2021-02-10 | End: 2021-03-25

## 2021-02-10 NOTE — TELEPHONE ENCOUNTER
Called pt and LVM letting her know that Rx for victoza was sent to the pharmacy as bydureon is not covered.

## 2021-02-10 NOTE — TELEPHONE ENCOUNTER
Plan does not cover liraglutide (VICTOZA) 18 MG/3ML solution.  Please call [email or web address] to initiate Prior Auth or change med.    Insurance type and ID number: [id number]      Additional Information:     Gracie Sy

## 2021-02-10 NOTE — TELEPHONE ENCOUNTER
Pls call patient and tell her that I wrote for Vctoza instead.  This is a medicaion that will need to taken daily.  The pharmacy can show her how to inject it or she can speak with one of our nurses here if she has questions.    Freya URIBE, CNP

## 2021-02-11 ENCOUNTER — ANCILLARY PROCEDURE (OUTPATIENT)
Dept: MAMMOGRAPHY | Facility: CLINIC | Age: 51
End: 2021-02-11
Attending: NURSE PRACTITIONER
Payer: COMMERCIAL

## 2021-02-11 DIAGNOSIS — E11.65 TYPE 2 DIABETES MELLITUS WITH HYPERGLYCEMIA, WITHOUT LONG-TERM CURRENT USE OF INSULIN (H): ICD-10-CM

## 2021-02-11 DIAGNOSIS — Z12.31 ENCOUNTER FOR SCREENING MAMMOGRAM FOR BREAST CANCER: ICD-10-CM

## 2021-02-11 DIAGNOSIS — E66.01 MORBID OBESITY WITH BMI OF 50.0-59.9, ADULT (H): ICD-10-CM

## 2021-02-11 PROCEDURE — 77063 BREAST TOMOSYNTHESIS BI: CPT | Mod: TC | Performed by: RADIOLOGY

## 2021-02-11 PROCEDURE — 77067 SCR MAMMO BI INCL CAD: CPT | Mod: TC | Performed by: RADIOLOGY

## 2021-02-11 RX ORDER — LIRAGLUTIDE 6 MG/ML
INJECTION SUBCUTANEOUS
Qty: 9 ML | Refills: 1 | OUTPATIENT
Start: 2021-02-11

## 2021-02-12 NOTE — TELEPHONE ENCOUNTER
Prior Authorization Approval    Authorization Effective Date: 2/12/2021  Authorization Expiration Date: 2/12/2022  Medication: liraglutide (VICTOZA) 18 MG/3ML solution-APPROVED  Approved Dose/Quantity:   Reference #:     Insurance Company: [employer] - Phone [phone] Fax [phone]  Expected CoPay:       CoPay Card Available:      Foundation Assistance Needed:    Which Pharmacy is filling the prescription (Not needed for infusion/clinic administered): [facility] [id number] [place] [address]  Pharmacy Notified: Yes  Patient Notified: No    Pharmacy will notify patient when medication is ready.

## 2021-02-12 NOTE — TELEPHONE ENCOUNTER
Central Prior Authorization Team   Phone: [phone]      PA Initiation    Medication: liraglutide (VICTOZA) 18 MG/3ML solution-Initiated  Insurance Company: [employer] - Phone [phone] Fax [phone]  Pharmacy Filling the Rx: [id number] [place] [facility] [place] - [address] [place] [address]  Filling Pharmacy Phone: [phone]  Filling Pharmacy Fax:    Start Date: 2/12/2021

## 2021-03-04 PROBLEM — R79.0 LOW FERRITIN: Status: ACTIVE | Noted: 2021-03-04

## 2021-03-04 PROBLEM — E78.5 HYPERLIPIDEMIA LDL GOAL <100: Chronic | Status: ACTIVE | Noted: 2021-03-04

## 2021-03-09 ENCOUNTER — VIRTUAL VISIT (OUTPATIENT)
Dept: SLEEP MEDICINE | Facility: CLINIC | Age: 51
End: 2021-03-09
Attending: NURSE PRACTITIONER
Payer: COMMERCIAL

## 2021-03-09 VITALS — BODY MASS INDEX: 43.4 KG/M2 | HEIGHT: 69 IN | WEIGHT: 293 LBS

## 2021-03-09 DIAGNOSIS — G25.81 RESTLESS LEGS SYNDROME (RLS): ICD-10-CM

## 2021-03-09 DIAGNOSIS — E66.01 MORBID OBESITY WITH BMI OF 50.0-59.9, ADULT (H): Chronic | ICD-10-CM

## 2021-03-09 DIAGNOSIS — R79.0 LOW FERRITIN: ICD-10-CM

## 2021-03-09 DIAGNOSIS — I10 ESSENTIAL HYPERTENSION WITH GOAL BLOOD PRESSURE LESS THAN 140/90: Chronic | ICD-10-CM

## 2021-03-09 DIAGNOSIS — G47.33 OSA (OBSTRUCTIVE SLEEP APNEA): Primary | Chronic | ICD-10-CM

## 2021-03-09 DIAGNOSIS — E11.65 TYPE 2 DIABETES MELLITUS WITH HYPERGLYCEMIA, WITHOUT LONG-TERM CURRENT USE OF INSULIN (H): Chronic | ICD-10-CM

## 2021-03-09 DIAGNOSIS — F51.04 CHRONIC INSOMNIA: ICD-10-CM

## 2021-03-09 PROCEDURE — 99203 OFFICE O/P NEW LOW 30 MIN: CPT | Mod: 95 | Performed by: INTERNAL MEDICINE

## 2021-03-09 ASSESSMENT — MIFFLIN-ST. JEOR: SCORE: 2375.37

## 2021-03-09 NOTE — PROGRESS NOTES
Antonietta is a 51 year old who is being evaluated via a billable video visit.      How would you like to obtain your AVS? Mail a copy  If the video visit is dropped, the invitation should be resent by:   Will anyone else be joining your video visit?       Video Start Time: 2:26 PM     Video-Visit Details    Type of service:  Video Visit    Video End Time:2:56 PM    Originating Location (pt. Location): Home    Distant Location (provider location):  [facility]     Platform used for Video Visit: Well           Sleep Consultation:    Date on this visit: 3/9/2021    Antonietta Christie is sent by Freya Painter for a sleep consultation regarding obstructive sleep apnea .    Primary Physician: Aster Espinal     Chief Complaint   Patient presents with     CPAP Follow Up     checking in on CPAP      DME [employer]    Patient initially presented to [facility] in 2013 as part of bariatric surgery evaluation with snoring, obesity, sleepiness (ESS 5), morning headaches, nocturnal GERD, narrowed oropharynx.   - Restless leg syndrome with low iron levels (ferritin 20)   - Night owl physiology, history of sleep maintenance insomnia.     She had a previous negative sleep study at [facility] but had gained weight since then.   Sleep Study 5/27/10 [facility] (313#)- AHI 2, RDI 9, Lo)2 92%, sleep fragmented, PLMI 18     Sleep Study 9/23/13 (367#)- AHI 26.2, RDI 30.8, Lo2 88%, TcCo2 levels serge from 40-41 to 51. Baseline ABG 7.41/38/91. PLMI 0. CPAP 9 cm effective including REM-supine. Max TcCO2 46, PLMI 1.0.     She was started on Auto-cpap 6-10 cmH20. Her compliance was poor.     She was not seen again....     Overall, the patient rates their experience with PAP as okay. Sometimes it is in the way. She hates that she has to use it. She is a side sleeper, tries to sleep on her back.     She forgets to put it on when she uses her phone in bed.     Patient uses nasal pillows.    CPAP download:  not available      Antonietta goes to bed at 11-12 PM during the week. She gets up at 8:00 AM with an alarm. She falls asleep in 5 minutes. Antonietta denies difficulty falling asleep.  She wakes up >2 times a night without difficulty falling back to sleep.  On weekends, Antonietta goes to sleep at 12-1 AM.  She gets up at 9-10 AM    Patient does use electronics in bed.     Patient does have a regular bed partner. Patient sleeps on her back and side. She has no morning headaches if she uses PAP    She has restless leg syndrome managed with gabapentin and ropinrole.     Antonietta denies any sleep walking, sleep talking and dream enactment.    Antonietta has gained 5-10 pounds since 2013.   Patient's Adrian Sleepiness score 7/24 inconsistent with excessive daytime sleepiness.  She feels she is periodically sleepy.     Antonietta naps rarely. She takes some inadvertant naps 1/week watching TV.  She denies dozing while driving.  She uses 2 sodas/day.     Recent Labs   Lab Test 02/04/21  1007 09/11/19  0843    140   POTASSIUM 4.4 4.2   CHLORIDE 104 107   CO2 28 27   ANIONGAP 4 6   * 119*   BUN 12 14   CR 0.66 0.77   SAMIA 8.9 9.3        Component      Latest Ref Rng & Units 4/25/2014 6/24/2014 7/1/2015   Ferritin      8 - 252 ng/mL 11 34 17       Allergies:    Allergies   Allergen Reactions     Lisinopril Cough     No Clinical Screening - See Comments Other (See Comments)     Hay fever (fall)--runny nose, sneezing, itchy eyes  Manganese violet dye in make up--red, itchy, mattery eyes       Medications:    Current Outpatient Medications   Medication Sig Dispense Refill     ASPIRIN LOW DOSE 81 MG EC tablet TAKE 1 TABLET BY MOUTH EVERY  tablet 3     atorvastatin (LIPITOR) 10 MG tablet Take 1 tablet (10 mg) by mouth daily 30 tablet 11     gabapentin (NEURONTIN) 300 MG capsule Take one capsule in the morning and 2 at night by mouth 270 capsule 3     ketoconazole (NIZORAL) 2 % external shampoo Apply topically daily as needed for itching  or irritation (Patient not taking: Reported on 2/2/2021) 120 mL 3     liraglutide (VICTOZA) 18 MG/3ML solution Inject 0.6 mg subcutaneously for 7 days, then increae to 1.2 mg daily for 7 days, then take 1.8 mg daily 9 mL 1     losartan (COZAAR) 100 MG tablet Take 1 tablet (100 mg) by mouth daily 30 tablet 5     metFORMIN (GLUCOPHAGE-XR) 500 MG 24 hr tablet Take 2 tabs with supper daily for 7 days, then take 3 tabs with supper for 7 days, then take 4 tabs daily 360 tablet 3     omeprazole (PRILOSEC) 20 MG DR capsule Take 1 capsule (20 mg) by mouth daily 90 capsule 3     order for DME Equipment being ordered: Glucometer per insurance preference, lancets, test strips.  Patient to check sugars 2 times daily, 90 day supply for test strips and lancets, refill 3 times 1 Device 3     ORDER FOR DME [employer]med S9 Auto CPAP 8-12cm H2O, [place] [employer] for her small nasal mask       rOPINIRole (REQUIP) 5 MG tablet TAKE 1 TABLET (5 MG) BY MOUTH AT BEDTIME NEEDS VISIT AND/OR LABS. 30 tablet 11       Problem List:  Patient Active Problem List    Diagnosis Date Noted     Hyperlipidemia LDL goal <100 03/04/2021     Priority: Medium     Type 2 diabetes mellitus with hyperglycemia, without long-term current use of insulin (H) 12/01/2016     Priority: Medium     Type 2 diabetes, HbA1c goal < 7%       Morbid obesity with BMI of 50.0-59.9, adult (H) 12/01/2016     Priority: Medium     CHELO (obstructive sleep apnea)- Moderate (AHI 26) 09/26/2013     Priority: Medium     Sleep Study 9/23/13 (367#)- AHI 26.2, RDI 30.8, Lo2 88%, TcCo2 levels serge from 40-41 to 51. Baseline ABG 7.41/38/91. PLMI 0. CPAP 9 cm effective including REM-supine. Max TcCO2 46, PLMI 1.0.         Essential hypertension with goal blood pressure less than 140/90 08/13/2013     Priority: Medium     Low ferritin 03/04/2021     Priority: Low     Seborrheic keratosis 09/12/2013     Priority: Low     Carpal tunnel syndrome 01/11/2013     Priority: Low     Peripheral edema 08/01/2012      Priority: Low     Restless legs syndrome (RLS) 10/11/2011     Priority: Low     Bunion of great toe of left foot 03/17/2011     Priority: Low     GERD (gastroesophageal reflux disease)      Priority: Low        Past Medical/Surgical History:  Past Medical History:   Diagnosis Date     Autoimmune hepatitis (H) 2005    Hx of 2000s     Hypertension goal BP (blood pressure) < 140/90 08/13/2013     Insomnia      Major depression 08/07/2017     Obesity     RX w/ phentiramine & topiramate in 2011     CHELO (obstructive sleep apnea)- Moderate (AHI 26)      Peripheral edema 08/01/2012     Restless legs syndrome (RLS)      Type 2 diabetes, HbA1c goal < 7% (H)      Past Surgical History:   Procedure Laterality Date     TONSILLECTOMY & ADENOIDECTOMY  1979       Social History:  Social History     Socioeconomic History     Marital status: Single     Spouse name: Not on file     Number of children: Not on file     Years of education: Not on file     Highest education level: Not on file   Occupational History     Occupation: Market research     Employer: [employer]   Social Needs     Financial resource strain: Not on file     Food insecurity     Worry: Not on file     Inability: Not on file     Transportation needs     Medical: Not on file     Non-medical: Not on file   Tobacco Use     Smoking status: Never Smoker     Smokeless tobacco: Never Used   Substance and Sexual Activity     Alcohol use: Yes     Comment: occasional     Drug use: No     Sexual activity: Never   Lifestyle     Physical activity     Days per week: Not on file     Minutes per session: Not on file     Stress: Not on file   Relationships     Social connections     Talks on phone: Not on file     Gets together: Not on file     Attends Scientology service: Not on file     Active member of club or organization: Not on file     Attends meetings of clubs or organizations: Not on file     Relationship status: Not on file     Intimate partner violence     Fear of  "current or ex partner: Not on file     Emotionally abused: Not on file     Physically abused: Not on file     Forced sexual activity: Not on file   Other Topics Concern     Parent/sibling w/ CABG, MI or angioplasty before 65F 55M? No   Social History Narrative     Not on file       Family History:  Family History   Problem Relation Age of Onset     Hyperlipidemia Father      Family History Negative No family hx of        Physical Examination:  Vitals: Ht 1.753 m (5' 9\")   Wt (!) 170.1 kg (375 lb)   BMI 55.38 kg/m    BMI= Body mass index is 55.38 kg/m .    ANNA 14    [place] Total Score 3/9/2021   Total score - [place] 7     SpO2 Readings from Last 4 Encounters:   02/04/21 95%   02/14/20 96%   01/15/20 98%   09/11/19 97%       GENERAL APPEARANCE: alert and no distress  EYES: Eyes grossly normal to inspection  HENT: mouth without ulcers or lesions  LUNGS: no shortness of breath , cough  NEURO: mentation intact, speech normal and cranial nerves 2-12 appear intact  PSYCH: affect normal/bright      Impression/Plan:    Moderate obstructive sleep apnea by sleep study 2013, tolerating CPAP well with minimal symptoms.  5-10# weight gain since 2013  - Get download  - Refills (AMAZON) after download attained    Insomnia  - Resolved    Restless leg syndrome, history of low iron   On daily requip.  - Risk of augmentation discussed  - Check ferritin     Shreyas Rodriguez MD   I spent 30 minutes with patient including counseling, and 10 minutes with chart review, and documentation     CC: Freya Painter        "

## 2021-04-26 ENCOUNTER — MYC MEDICAL ADVICE (OUTPATIENT)
Dept: FAMILY MEDICINE | Facility: CLINIC | Age: 51
End: 2021-04-26

## 2021-04-26 DIAGNOSIS — Z12.11 SCREEN FOR COLON CANCER: Primary | ICD-10-CM

## 2021-05-27 DIAGNOSIS — E11.65 TYPE 2 DIABETES MELLITUS WITH HYPERGLYCEMIA, WITHOUT LONG-TERM CURRENT USE OF INSULIN (H): Primary | Chronic | ICD-10-CM

## 2021-05-27 NOTE — TELEPHONE ENCOUNTER
To provider to advise on refill request for test strips  Routing refill request to provider for review/approval because:  Labs not current:    Hemoglobin A1C   Date Value Ref Range Status   02/04/2021 9.2 (H) 0 - 5.6 % Final     Comment:     Results confirmed by repeat test  Normal <5.7% Prediabetes 5.7-6.4%  Diabetes 6.5% or higher - adopted from ADA   consensus guidelines.     per 2/4/21 lab note she was to recheck A1c in 2 months.    Last OV: 2/4/21 ALBERT Painter CNP for well check

## 2021-05-27 NOTE — LETTER
Antonietta Christie  [address] [address] [facility] [address]      May 28, 2021        Dear Antonietta,      Our records indicate that you have not scheduled for a(n)Diabetic check  which was recommended by your health care team. Monitoring and managing your preventative and chronic health conditions are very important to us.       If you have received your health care elsewhere, please provide us with that information so it can be documented in your chart.    Please call [phone] or message us through your [employer] account to schedule an appointment or provide information for your chart.     We look forward to seeing you and working with you on your health care needs.     Sincerely,   Freya URIBE, CNP/kp          *If you have already scheduled an appointment, please disregard this reminder

## 2021-05-28 RX ORDER — BLOOD SUGAR DIAGNOSTIC
STRIP MISCELLANEOUS
Qty: 50 STRIP | Refills: 7 | Status: SHIPPED | OUTPATIENT
Start: 2021-05-28 | End: 2021-12-28

## 2021-07-12 DIAGNOSIS — E11.65 TYPE 2 DIABETES MELLITUS WITH HYPERGLYCEMIA, WITHOUT LONG-TERM CURRENT USE OF INSULIN (H): ICD-10-CM

## 2021-07-12 DIAGNOSIS — E66.01 MORBID OBESITY WITH BMI OF 50.0-59.9, ADULT (H): ICD-10-CM

## 2021-07-13 RX ORDER — LIRAGLUTIDE 6 MG/ML
1.8 INJECTION SUBCUTANEOUS DAILY
Qty: 9 ML | Refills: 0 | Status: SHIPPED | OUTPATIENT
Start: 2021-07-13 | End: 2021-08-13

## 2021-07-13 NOTE — TELEPHONE ENCOUNTER
Routing refill request to provider for review/approval because:  Labs not current:    Lab Results   Component Value Date    A1C 9.2 02/04/2021    A1C 6.0 01/16/2020    A1C 6.5 09/11/2019    A1C 6.5 01/25/2019    A1C 7.0 05/10/2018     Myra Gray RN

## 2021-08-13 DIAGNOSIS — E66.01 MORBID OBESITY WITH BMI OF 50.0-59.9, ADULT (H): ICD-10-CM

## 2021-08-13 DIAGNOSIS — E11.65 TYPE 2 DIABETES MELLITUS WITH HYPERGLYCEMIA, WITHOUT LONG-TERM CURRENT USE OF INSULIN (H): ICD-10-CM

## 2021-08-13 RX ORDER — LIRAGLUTIDE 6 MG/ML
1.8 INJECTION SUBCUTANEOUS DAILY
Qty: 3 ML | Refills: 0 | Status: SHIPPED | OUTPATIENT
Start: 2021-08-13 | End: 2021-10-26

## 2021-08-13 NOTE — TELEPHONE ENCOUNTER
Routing refill request to provider for review/approval because:  Labs out of range:  A1C    Trinity Rudd RN, BSN, CMSRN  [facility]

## 2021-09-04 ENCOUNTER — HEALTH MAINTENANCE LETTER (OUTPATIENT)
Age: 51
End: 2021-09-04

## 2021-09-09 DIAGNOSIS — I10 ESSENTIAL HYPERTENSION WITH GOAL BLOOD PRESSURE LESS THAN 140/90: ICD-10-CM

## 2021-09-09 DIAGNOSIS — E11.65 TYPE 2 DIABETES MELLITUS WITH HYPERGLYCEMIA, WITHOUT LONG-TERM CURRENT USE OF INSULIN (H): ICD-10-CM

## 2021-09-13 RX ORDER — LOSARTAN POTASSIUM 100 MG/1
TABLET ORAL
Qty: 30 TABLET | Refills: 5 | Status: SHIPPED | OUTPATIENT
Start: 2021-09-13 | End: 2021-12-28

## 2021-10-07 DIAGNOSIS — E11.65 TYPE 2 DIABETES MELLITUS WITH HYPERGLYCEMIA, WITHOUT LONG-TERM CURRENT USE OF INSULIN (H): ICD-10-CM

## 2021-10-07 DIAGNOSIS — E66.01 MORBID OBESITY WITH BMI OF 50.0-59.9, ADULT (H): ICD-10-CM

## 2021-10-07 NOTE — TELEPHONE ENCOUNTER
Routing refill request to provider for review/approval because:  Lynette given x1 and patient did not follow up, please advise  Melissa Kimbrough BSN, RN

## 2021-10-11 DIAGNOSIS — I10 ESSENTIAL HYPERTENSION WITH GOAL BLOOD PRESSURE LESS THAN 140/90: Primary | ICD-10-CM

## 2021-10-11 DIAGNOSIS — G25.81 RESTLESS LEGS SYNDROME (RLS): ICD-10-CM

## 2021-10-11 RX ORDER — LIRAGLUTIDE 6 MG/ML
INJECTION SUBCUTANEOUS
Refills: 1 | OUTPATIENT
Start: 2021-10-11

## 2021-10-11 NOTE — TELEPHONE ENCOUNTER
Routing refill request to provider for review/approval because:  Drug not on the [facility] refill protocol     Delmis SANTOSN, RN

## 2021-10-14 RX ORDER — GABAPENTIN 300 MG/1
CAPSULE ORAL
Qty: 270 CAPSULE | Refills: 0 | Status: SHIPPED | OUTPATIENT
Start: 2021-10-14 | End: 2021-12-28

## 2021-10-28 ENCOUNTER — E-VISIT (OUTPATIENT)
Dept: URGENT CARE | Facility: CLINIC | Age: 51
End: 2021-10-28
Payer: COMMERCIAL

## 2021-10-28 DIAGNOSIS — R05.9 COUGH: Primary | ICD-10-CM

## 2021-10-28 PROCEDURE — 99421 OL DIG E/M SVC 5-10 MIN: CPT | Performed by: PHYSICIAN ASSISTANT

## 2021-10-28 RX ORDER — BENZONATATE 200 MG/1
200 CAPSULE ORAL 3 TIMES DAILY PRN
Qty: 30 CAPSULE | Refills: 0 | Status: SHIPPED | OUTPATIENT
Start: 2021-10-28 | End: 2021-12-28

## 2021-10-28 RX ORDER — AZITHROMYCIN 250 MG/1
TABLET, FILM COATED ORAL
Qty: 6 TABLET | Refills: 0 | Status: SHIPPED | OUTPATIENT
Start: 2021-10-28 | End: 2021-11-02

## 2021-10-28 NOTE — PATIENT INSTRUCTIONS
"    Dear Antonietta Christie    After reviewing your responses, I've been able to diagnose you with \"Bronchitis\" which is a common infection of your lungs. While this is most commonly caused by a virus, the symptoms you have given suggest you should be treated with antibiotics.     I have sent medications to your pharmacy to treat this infection.     It is important that you take all of your prescribed medication even if your symptoms are improving after a few doses. Taking all of your medicine helps prevent the symptoms from returning.     If your symptoms worsen, you develop chest pain or shortness of breath, fevers over 101, or are not improving in 5 days, please contact your primary care provider for an appointment or visit any of our convenient Walk-in Care or Urgent Care Centers to be seen which can be found on our website here.    Thanks again for choosing us as your health care partner,    Israel Silva PA-C    "

## 2021-12-16 DIAGNOSIS — E66.01 MORBID OBESITY WITH BMI OF 50.0-59.9, ADULT (H): ICD-10-CM

## 2021-12-16 DIAGNOSIS — E11.65 TYPE 2 DIABETES MELLITUS WITH HYPERGLYCEMIA, WITHOUT LONG-TERM CURRENT USE OF INSULIN (H): ICD-10-CM

## 2021-12-16 NOTE — TELEPHONE ENCOUNTER
"Requested Prescriptions   Pending Prescriptions Disp Refills    VICTOZA PEN 18 MG/3ML soln [Pharmacy Med Name: VICTOZA 3-INDIA 18 MG/3 ML PEN]  1     Sig: INJECT 1.8MG SUBCUTANEOUSLY DAILY. NEEDS VISIT AND LABS FOR MORE REFILLS        GLP-1 Agonists Protocol Failed - 12/16/2021 12:39 AM        Failed - HgbA1C in past 3 or 6 months     If HgbA1C is 8 or greater, it needs to be on file within the past 3 months.  If less than 8, must be on file within the past 6 months.     Recent Labs   Lab Test 02/04/21  1007   A1C 9.2*             Failed - Recent (6 mo) or future (30 days) visit within the authorizing provider's specialty     Patient had office visit in the last 6 months or has a visit in the next 30 days with authorizing provider.  See \"Patient Info\" tab in inbasket, or \"Choose Columns\" in Meds & Orders section of the refill encounter.            Passed - Medication is active on med list        Passed - Patient is age 18 or older        Passed - No active pregnancy on record        Passed - Normal serum creatinine on file in past 12 months     Recent Labs   Lab Test 02/04/21  1007   CR 0.66       Ok to refill medication if creatinine is low          Passed - No positive pregnancy test in past 12 months              "

## 2021-12-20 RX ORDER — LIRAGLUTIDE 6 MG/ML
INJECTION SUBCUTANEOUS
Qty: 9 ML | Refills: 0 | Status: SHIPPED | OUTPATIENT
Start: 2021-12-20 | End: 2021-12-28

## 2021-12-28 ENCOUNTER — OFFICE VISIT (OUTPATIENT)
Dept: FAMILY MEDICINE | Facility: CLINIC | Age: 51
End: 2021-12-28
Payer: COMMERCIAL

## 2021-12-28 ENCOUNTER — DOCUMENTATION ONLY (OUTPATIENT)
Dept: LAB | Facility: CLINIC | Age: 51
End: 2021-12-28

## 2021-12-28 VITALS
WEIGHT: 293 LBS | RESPIRATION RATE: 17 BRPM | OXYGEN SATURATION: 97 % | TEMPERATURE: 98.9 F | DIASTOLIC BLOOD PRESSURE: 92 MMHG | HEART RATE: 94 BPM | BODY MASS INDEX: 56.41 KG/M2 | SYSTOLIC BLOOD PRESSURE: 140 MMHG

## 2021-12-28 DIAGNOSIS — E11.65 TYPE 2 DIABETES MELLITUS WITH HYPERGLYCEMIA, WITHOUT LONG-TERM CURRENT USE OF INSULIN (H): Primary | ICD-10-CM

## 2021-12-28 DIAGNOSIS — E78.5 HYPERLIPIDEMIA LDL GOAL <100: ICD-10-CM

## 2021-12-28 DIAGNOSIS — R74.01 ELEVATED AST (SGOT): ICD-10-CM

## 2021-12-28 DIAGNOSIS — Z12.11 SPECIAL SCREENING FOR MALIGNANT NEOPLASMS, COLON: ICD-10-CM

## 2021-12-28 DIAGNOSIS — E66.01 MORBID OBESITY WITH BMI OF 50.0-59.9, ADULT (H): ICD-10-CM

## 2021-12-28 DIAGNOSIS — I10 ESSENTIAL HYPERTENSION WITH GOAL BLOOD PRESSURE LESS THAN 140/90: ICD-10-CM

## 2021-12-28 DIAGNOSIS — G25.81 RESTLESS LEGS SYNDROME (RLS): ICD-10-CM

## 2021-12-28 DIAGNOSIS — K21.9 GASTROESOPHAGEAL REFLUX DISEASE, UNSPECIFIED WHETHER ESOPHAGITIS PRESENT: Chronic | ICD-10-CM

## 2021-12-28 DIAGNOSIS — B37.31 CANDIDA VAGINITIS: ICD-10-CM

## 2021-12-28 DIAGNOSIS — N89.8 VAGINAL ITCHING: ICD-10-CM

## 2021-12-28 LAB
ALBUMIN UR-MCNC: NEGATIVE MG/DL
ANION GAP SERPL CALCULATED.3IONS-SCNC: 6 MMOL/L (ref 3–14)
APPEARANCE UR: CLEAR
BACTERIA #/AREA URNS HPF: ABNORMAL /HPF
BILIRUB UR QL STRIP: NEGATIVE
BUN SERPL-MCNC: 11 MG/DL (ref 7–30)
CALCIUM SERPL-MCNC: 8.9 MG/DL (ref 8.5–10.1)
CHLORIDE BLD-SCNC: 104 MMOL/L (ref 94–109)
CLUE CELLS: ABNORMAL
CO2 SERPL-SCNC: 26 MMOL/L (ref 20–32)
COLOR UR AUTO: YELLOW
CREAT SERPL-MCNC: 0.63 MG/DL (ref 0.52–1.04)
CREAT UR-MCNC: 174 MG/DL
FERRITIN SERPL-MCNC: 14 NG/ML (ref 8–252)
GFR SERPL CREATININE-BSD FRML MDRD: >90 ML/MIN/1.73M2
GLUCOSE BLD-MCNC: 197 MG/DL (ref 70–99)
GLUCOSE UR STRIP-MCNC: NEGATIVE MG/DL
HBA1C MFR BLD: 9.2 % (ref 0–5.6)
HGB UR QL STRIP: NEGATIVE
HOLD SPECIMEN: NORMAL
KETONES UR STRIP-MCNC: NEGATIVE MG/DL
LEUKOCYTE ESTERASE UR QL STRIP: ABNORMAL
MICROALBUMIN UR-MCNC: 27 MG/L
MICROALBUMIN/CREAT UR: 15.52 MG/G CR (ref 0–25)
MUCOUS THREADS #/AREA URNS LPF: PRESENT /LPF
NITRATE UR QL: NEGATIVE
PH UR STRIP: 5.5 [PH] (ref 5–7)
POTASSIUM BLD-SCNC: 4.6 MMOL/L (ref 3.4–5.3)
RBC #/AREA URNS AUTO: ABNORMAL /HPF
SODIUM SERPL-SCNC: 136 MMOL/L (ref 133–144)
SP GR UR STRIP: >=1.03 (ref 1–1.03)
SQUAMOUS #/AREA URNS AUTO: ABNORMAL /LPF
TRICHOMONAS, WET PREP: ABNORMAL
UROBILINOGEN UR STRIP-ACNC: 0.2 E.U./DL
WBC #/AREA URNS AUTO: ABNORMAL /HPF
WBC'S/HIGH POWER FIELD, WET PREP: ABNORMAL
YEAST #/AREA URNS HPF: ABNORMAL /HPF
YEAST, WET PREP: PRESENT

## 2021-12-28 PROCEDURE — 80061 LIPID PANEL: CPT | Performed by: NURSE PRACTITIONER

## 2021-12-28 PROCEDURE — 87186 SC STD MICRODIL/AGAR DIL: CPT | Performed by: NURSE PRACTITIONER

## 2021-12-28 PROCEDURE — 83036 HEMOGLOBIN GLYCOSYLATED A1C: CPT | Performed by: NURSE PRACTITIONER

## 2021-12-28 PROCEDURE — 36415 COLL VENOUS BLD VENIPUNCTURE: CPT | Performed by: NURSE PRACTITIONER

## 2021-12-28 PROCEDURE — 81001 URINALYSIS AUTO W/SCOPE: CPT | Performed by: NURSE PRACTITIONER

## 2021-12-28 PROCEDURE — 87210 SMEAR WET MOUNT SALINE/INK: CPT | Performed by: NURSE PRACTITIONER

## 2021-12-28 PROCEDURE — 99214 OFFICE O/P EST MOD 30 MIN: CPT | Performed by: NURSE PRACTITIONER

## 2021-12-28 PROCEDURE — 80048 BASIC METABOLIC PNL TOTAL CA: CPT | Performed by: NURSE PRACTITIONER

## 2021-12-28 PROCEDURE — 82043 UR ALBUMIN QUANTITATIVE: CPT | Performed by: NURSE PRACTITIONER

## 2021-12-28 PROCEDURE — 82728 ASSAY OF FERRITIN: CPT | Performed by: NURSE PRACTITIONER

## 2021-12-28 PROCEDURE — 87086 URINE CULTURE/COLONY COUNT: CPT | Performed by: NURSE PRACTITIONER

## 2021-12-28 PROCEDURE — 84450 TRANSFERASE (AST) (SGOT): CPT | Performed by: NURSE PRACTITIONER

## 2021-12-28 RX ORDER — ATORVASTATIN CALCIUM 10 MG/1
10 TABLET, FILM COATED ORAL DAILY
Qty: 30 TABLET | Refills: 11 | Status: SHIPPED | OUTPATIENT
Start: 2021-12-28 | End: 2022-12-21

## 2021-12-28 RX ORDER — ZINC GLUCONATE 50 MG
TABLET ORAL
COMMUNITY
Start: 2021-10-11

## 2021-12-28 RX ORDER — LANCETS
EACH MISCELLANEOUS
COMMUNITY
Start: 2021-04-11

## 2021-12-28 RX ORDER — LOSARTAN POTASSIUM 100 MG/1
100 TABLET ORAL DAILY
Qty: 90 TABLET | Refills: 3 | Status: SHIPPED | OUTPATIENT
Start: 2021-12-28 | End: 2022-12-21

## 2021-12-28 RX ORDER — LIRAGLUTIDE 6 MG/ML
INJECTION SUBCUTANEOUS
Qty: 27 ML | Refills: 3 | Status: SHIPPED | OUTPATIENT
Start: 2021-12-28 | End: 2022-03-11

## 2021-12-28 RX ORDER — ACETAMINOPHEN 160 MG
TABLET,DISINTEGRATING ORAL
COMMUNITY
Start: 2021-10-11

## 2021-12-28 RX ORDER — BLOOD SUGAR DIAGNOSTIC
STRIP MISCELLANEOUS
Qty: 100 STRIP | Refills: 5 | Status: SHIPPED | OUTPATIENT
Start: 2021-12-28 | End: 2022-12-21

## 2021-12-28 RX ORDER — FLUCONAZOLE 150 MG/1
150 TABLET ORAL ONCE
Qty: 1 TABLET | Refills: 0 | Status: SHIPPED | OUTPATIENT
Start: 2021-12-28 | End: 2021-12-28

## 2021-12-28 RX ORDER — METFORMIN HCL 500 MG
1000 TABLET, EXTENDED RELEASE 24 HR ORAL 2 TIMES DAILY WITH MEALS
Qty: 360 TABLET | Refills: 3 | Status: SHIPPED | OUTPATIENT
Start: 2021-12-28 | End: 2022-12-21

## 2021-12-28 RX ORDER — GABAPENTIN 300 MG/1
CAPSULE ORAL
Qty: 270 CAPSULE | Refills: 3 | Status: SHIPPED | OUTPATIENT
Start: 2021-12-28 | End: 2022-12-21

## 2021-12-28 NOTE — PROGRESS NOTES
Ep    Assessment & Plan       Type 2 diabetes mellitus with hyperglycemia, without long-term current use of insulin (H)  Had long discussion on need for improved glycemic control in order to help prevent long term complications, continue current medicationa and adding Jardiance 10 mg. Patient will  Send me a message in a month to let me know how she is tolerating it and if she's doing OK, will increase dose to 25 mg daily, advised use of prescription care through drug company for discount.   - **A1C FUTURE anytime  - Comprehensive metabolic panel (BMP + Alb, Alk Phos, ALT, AST, Total. Bili, TP)  - Albumin Random Urine Quantitative with Creat Ratio  - Adult Eye Referral  - Albumin Random Urine Quantitative with Creat Ratio  - empagliflozin (JARDIANCE) 10 MG TABS tablet  Dispense: 30 tablet; Refill: 0  - AMB Adult Diabetes Educator Referral  - liraglutide (VICTOZA PEN) 18 MG/3ML solution  Dispense: 27 mL; Refill: 3  - metFORMIN (GLUCOPHAGE-XR) 500 MG 24 hr tablet  Dispense: 360 tablet; Refill: 3  - losartan (COZAAR) 100 MG tablet  Dispense: 90 tablet; Refill: 3  - atorvastatin (LIPITOR) 10 MG tablet  Dispense: 30 tablet; Refill: 11  - aspirin (ASPIRIN LOW DOSE) 81 MG EC tablet  Dispense: 100 tablet; Refill: 3  - blood glucose (ACCU-CHEK GUIDE) test strip  Dispense: 100 strip; Refill: 5  - **A1C FUTURE 3mo    Morbid obesity with BMI of 50.0-59.9, adult (H)  Benefits of weight loss reviewed in detail, encouraged her to cut back on the carbohydrates in the diet, consume more fruits and vegetables, drink plenty of water, avoid fruit juices, sodas, get 150 min moderate exercise/week.  She is not interested in bariatric referral at this time.Recheck weight in 6 months.    - liraglutide (VICTOZA PEN) 18 MG/3ML solution  Dispense: 27 mL; Refill: 3    Restless legs syndrome (RLS)  Checking Ferritin and refilled Neurontin which is working well for her.  - Ferritin  - gabapentin (NEURONTIN) 300 MG capsule  Dispense: 270 capsule;  Refill: 3    Essential hypertension with goal blood pressure less than 140/90  BP elevated today, continue Cozaar, stressed low salt diet, regular exercise, weight loss.  - Basic metabolic panel  (Ca, Cl, CO2, Creat, Gluc, K, Na, BUN)  - Comprehensive metabolic panel (BMP + Alb, Alk Phos, ALT, AST, Total. Bili, TP)  - losartan (COZAAR) 100 MG tablet  Dispense: 90 tablet; Refill: 3  - Home Blood Pressure Monitor Order for DME - ONLY FOR DME    Vaginal itching  Checking lab, treat as indicated.  - Wet prep - lab collect  - UA Macro with Reflex to Micro and Culture - lab collect  - Wet prep - lab collect  - UA Macro with Reflex to Micro and Culture - lab collect  - Urine Microscopic    Gastroesophageal reflux disease, unspecified whether esophagitis present  Benefits of weight loss reviewed, also discussed triggers/avoidance  - omeprazole (PRILOSEC) 20 MG DR capsule  Dispense: 90 capsule; Refill: 3    Candida vaginitis  Treating with Diflucan.     Hyperlipidemia LDL goal <100  [facility] 10 mg Lipitor daily, low chol diet.  - Lipid panel reflex to direct LDL Fasting  - AST  - Comprehensive metabolic panel (BMP + Alb, Alk Phos, ALT, AST, Total. Bili, TP)    Special screening for malignant neoplasms, colon    - Fecal colorectal cancer screen (FIT)    - **A1C FUTURE anytime  - Comprehensive metabolic panel (BMP + Alb, Alk Phos, ALT, AST, Total. Bili, TP)  - Albumin Random Urine Quantitative with Creat Ratio  - Adult Eye Referral  - Albumin Random Urine Quantitative with Creat Ratio  - empagliflozin (JARDIANCE) 10 MG TABS tablet  Dispense: 30 tablet; Refill: 0  - AMB Adult Diabetes Educator Referral  - liraglutide (VICTOZA PEN) 18 MG/3ML solution  Dispense: 27 mL; Refill: 3  - metFORMIN (GLUCOPHAGE-XR) 500 MG 24 hr tablet  Dispense: 360 tablet; Refill: 3  - losartan (COZAAR) 100 MG tablet  Dispense: 90 tablet; Refill: 3  - atorvastatin (LIPITOR) 10 MG tablet  Dispense: 30 tablet; Refill: 11  - aspirin  "(ASPIRIN LOW DOSE) 81 MG EC tablet  Dispense: 100 tablet; Refill: 3  - blood glucose (ACCU-CHEK GUIDE) test strip  Dispense: 100 strip; Refill: 5  - **A1C FUTURE 3mo        Ordering of each unique test  Prescription drug management  45  minutes spent on the date of the encounter doing chart review, history and exam, documentation and further activities per the note       BMI:   Estimated body mass index is 56.41 kg/m  as calculated from the following:    Height as of 3/9/21: 1.753 m (5' 9\").    Weight as of this encounter: 173.3 kg (382 lb).   Weight management plan: Discussed healthy diet and exercise guidelines    Work on weight loss  Regular exercise  See Patient Instructions    No follow-ups on file.    RONY Kim [facility]    Sarah Mane is a 51 year old who presents for the following health issues     HPI     Diabetes Follow-up      How often are you checking your blood sugar? Not at all    What concerns do you have today about your diabetes? Blood sugar is often over 200     Do you have any of these symptoms? (Select all that apply)  No numbness or tingling in feet.  No redness, sores or blisters on feet.  No complaints of excessive thirst.  No reports of blurry vision.  No significant changes to weight.      BP Readings from Last 2 Encounters:   12/28/21 (!) 140/92   02/04/21 136/88     Hemoglobin A1C POCT (%)   Date Value   02/04/2021 9.2 (H)   01/16/2020 6.0 (H)     Hemoglobin A1C (%)   Date Value   12/28/2021 9.2 (H)     LDL Cholesterol Calculated (mg/dL)   Date Value   02/04/2021 64   01/16/2020 76         Hyperlipidemia Follow-Up      Are you regularly taking any medication or supplement to lower your cholesterol?   Yes- Lipitor   10 mg    Are you having muscle aches or other side effects that you think could be caused by your cholesterol lowering medication?  No    Hypertension Follow-up      Do you check your blood pressure regularly outside of the " clinic? No     Are you following a low salt diet? No    Are your blood pressures ever more than 140 on the top number (systolic) OR more   than 90 on the bottom number (diastolic), for example 140/90?       How many servings of fruits and vegetables do you eat daily?  0-1    On average, how many sweetened beverages do you drink each day (Examples: soda, juice, sweet tea, etc.  Do NOT count diet or artificially sweetened beverages)?   1    How many days per week do you exercise enough to make your heart beat faster? 3 or less    How many minutes a day do you exercise enough to make your heart beat faster? 9 or less    How many days per week do you miss taking your medication? 0    Vaginal Symptoms  Onset/Duration: several months  Description  Vaginal Discharge: clear   Itching (Pruritis): YES  Burning sensation:  YES with urination  Odor: no  Accompanying Signs & Symptoms:  Urinary symptoms: no  Abdominal pain: no  Fever: no  History:   Sexually active: no  New Partner: no  Possibility of Pregnancy:  no  Recent antibiotic use: no  Previous vaginitis issues: no  Precipitating or alleviating factors: None  Therapies tried and outcome: none        Review of Systems   Constitutional, HEENT, cardiovascular, pulmonary, gi and gu systems are negative, except as otherwise noted.      Objective    BP (!) 140/92   Pulse 94   Temp 98.9  F (37.2  C) (Tympanic)   Resp 17   Wt (!) 173.3 kg (382 lb)   SpO2 97%   BMI 56.41 kg/m    Body mass index is 56.41 kg/m .  Physical Exam   GENERAL: healthy, alert and no distress  EYES: Eyes grossly normal to inspection, PERRL and conjunctivae and sclerae normal  HENT: ear canals and TM's normal, nose and mouth without ulcers or lesions  NECK: no adenopathy, no asymmetry, masses, or scars and thyroid normal to palpation  RESP: lungs clear to auscultation - no rales, rhonchi or wheezes  CV: regular rate and rhythm, normal S1 S2, no S3 or S4, no murmur, click or rub, no peripheral edema and  peripheral pulses strong  ABDOMEN: soft, nontender, no hepatosplenomegaly, no masses and bowel sounds normal  MS: no gross musculoskeletal defects noted, no edema  SKIN: no suspicious lesions or rashes  NEURO: Normal strength and tone, mentation intact and speech normal  BACK: no CVA tenderness, no paralumbar tenderness  PSYCH: mentation appears normal, affect normal/bright  LYMPH: normal ant/post cervical, supraclavicular nodes  Diabetic foot exam: normal DP and PT pulses, no trophic changes or ulcerative lesions, normal sensory exam, normal monofilament exam, dry cracking heels and onychomycosis    Results for orders placed or performed in visit on 12/28/21 (from the past 24 hour(s))   **A1C FUTURE anytime   Result Value Ref Range    Hemoglobin A1C 9.2 (H) 0.0 - 5.6 %    Narrative    Reviewed, ok with previous.

## 2021-12-28 NOTE — PROGRESS NOTES
Patient was seen in clinic on 12/28/2021 and came to lab before being seen. Patient had a BMP drawn at that time. I see that you put in future orders for a CMP/AST/LIPID. Would you like us to run the AST and LIPID? I am unsure if she was fasting.     Please advise.     Thank you  Sol Arce MLT ([facility])

## 2021-12-28 NOTE — PATIENT INSTRUCTIONS
Patient Education     Diabetes: Activity Tips    Being more active can help you manage your diabetes. The tips on this sheet can help you get the most from your exercise. They can also help you stay safe.   Staying active   It s important for adults to spend less time sitting and being inactive. This is especially true if you have type 2 diabetes. When you are sitting for long periods of time, get up for short sessions of light activity every 30 minutes.   Aim for at least 150 minutes a week of exercise or physical activity. That's about 30 minutes a day 5 to 7 days a week. Don t let more than 2 days go by without being active. Break up your daily activity into 10-minute blocks. Or choose a daily schedule that works for you. Make your goal 30 minutes of daily physical activity.   Benefit from briskness  Brisk activity gets your heart beating faster. This can help make you more fit, lose extra weight, and manage your blood sugar level. Try brisk walking. Or try swimming or bike riding if you have foot or leg problems. You can break up your exercise into chunks throughout the day. Work up to at least 30 minutes of steady, brisk exercise on most days.   Warm up and cool down  Warming up and cooling down reduce your risk for injury. This also helps limit muscle soreness. Do a mild version of your activity for 5 minutes before and after your routine. You can also learn stretches that will help keep your muscles loose. Your healthcare provider may show you good ways to warm up and stretch.   Do the talk-sing test  The talk-sing test is a simple way to tell how hard you re exercising. If you can talk while exercising, you re in a safe range. If you re out of breath, slow down. If you can carry a tune, it s time to  the pace. Walk up a hill. Add more resistance on your stationary bike. Or swim faster.   What about eating?  You may be told to plan your exercise for 1 to 2 hours after a meal. In most cases, you don t  need to eat while being active. Test your blood sugar before exercising if you take insulin or medicine that can cause low blood sugar. And carry a fast-acting sugar that will raise your blood sugar level quickly. This includes glucose tablets or glucose gel in a tube. Use it if you feel low blood sugar symptoms.   Safety tips  These tips can help you stay safe as you become fit:    Exercise with a friend or carry a cell phone if you have one.    Carry or wear ID such as a necklace or bracelet that says you have diabetes.    Use the correct footwear and safety equipment for your activity.    Drink water before, during, and after exercise.    Dress for the weather.    Don t exercise in very hot or very cold weather.    Don t exercise if you are sick.    Test your blood sugar before and after you exercise if you are told to do so. Have a small carbohydrate snack if your blood sugar is low before you start exercising.   When to stop exercising and call your healthcare provider  Stop exercising and call your healthcare provider right away if you notice any of the following:     Pain, pressure, tightness, or heaviness in the chest    Pain or heaviness in the neck, shoulders, back, arms, legs, or feet    Unusual shortness of breath    Dizziness or lightheadedness    Unusually rapid or slow pulse    Increased joint or muscle pain    Nausea or vomiting  [employer] last reviewed this educational content on 11/1/2018 2000-2021 [employer]. All rights reserved. This information is not intended as a substitute for professional medical care. Always follow your healthcare professional's instructions.           Patient Education     Diabetes: Meal Planning    You can help keep your blood sugar level in your target range by eating healthy foods. Your healthcare team can help you create a low-fat, nutritious meal plan. Take an active role in your diabetes management. Follow your meal plan and work with your healthcare  "team.  Make your meal plan  A meal plan gives guidelines for the types and amounts of food you should eat. The goal is to balance food and insulin (or other diabetes medicines). That way, your blood sugars will be in your target range. Your dietitian will help you make a flexible meal plan that has many foods that you like.  Watch serving sizes  Your meal plan will group foods by servings. To learn how much a serving is, start by measuring food portions at each meal. Soon you ll know what a serving looks like on your plate. Ask your healthcare provider about how to balance servings of different foods.  Eat from all the food groups  The basis of a healthy meal plan is variety (eating lots of different foods). Choose lean meats, fresh fruits and vegetables, whole grains, and low-fat or nonfat dairy products. Eating a wide variety of foods gives your body the nutrients it needs. It can also keep you from getting bored with your meal plan.  Learn about carbohydrates, fats, and protein    Carbohydrates are starches, sugars, and fiber. They are found in many foods. These include fruit, bread, pasta, milk, and sweets. Of all the foods you eat, carbohydrates have the most effect on your blood sugar. Your dietitian may teach you about carb counting. This is a way to figure out the number of carbohydrates in a meal.    Fats have the most calories. They also have the most effect on your weight and your risk of heart disease. When you have diabetes, it s important to control your weight and protect your heart. Foods that are high in fat include whole milk, cheese, snack foods, and desserts. You can eat more of the \"heart-healthy\" fats such as avocados, salmon, tuna, and olive oil.    Protein is important for building and repairing muscles and bones. Choose low-fat protein sources, such as fish, egg whites, and skinless chicken.  Reduce liquid sugars  Extra calories from sodas, sports drinks, and fruit drinks make it hard to " keep blood sugar in range. Cut as many liquid sugars from your meal plan as you can.  This includes most fruit juices. They are often high in natural or added sugar. Instead, drink plenty of water and other sugar-free beverages.  Eat less fat  If you need to lose weight, try to reduce the amount of fat in your diet. This can also help lower your cholesterol level to keep blood vessels healthier. Cut fat by using only small amounts of liquid oil for cooking. Read food labels carefully. Stay away from foods with unhealthy trans fats.  When it comes to blood sugar control, when you eat is as important as what you eat. You may need to eat several small meals spaced evenly throughout the day to stay in your target range. So don t skip breakfast or wait until late in the day to get most of your calories. Doing so can cause your blood sugar to rise too high or fall too low.  [email or web address] last reviewed this educational content on 4/1/2019 2000-2021 [employer]. All rights reserved. This information is not intended as a substitute for professional medical care. Always follow your healthcare professional's instructions.           Patient Education     Diabetes: Learning About Serving and Portion Sizes   Servings and portions. What s the difference? These terms can be very confusing. But learning to measure serving sizes can help you figure out how many carbohydrates (carbs) and other foods you eat each day. They are also powerful tools for managing your weight.      A good rule of thumb: Devote half your plate to vegetables and green salad. Split the other half between protein and starchy carbohydrates. Fruit makes a good dessert.   Servings and portions   Many different words are used to describe amounts of food. If your healthcare provider uses a term you re not sure of, don t be afraid to ask. It helps to know the difference between servings and portions:     A serving size is a fixed size. Food producers use  this term to describe their products. For example, the label on a cereal box could say that 1 cup of dry cereal = 1 serving.    A portion (also called a  helping ) is how much you eat or how much you put on your plate at a meal. For example, you might eat 2 cups of cereal at breakfast.  Watching serving sizes   The portion you choose to eat (such as 2 cups of cereal) may be more than 1 serving as listed on the food label (such as 1 cup of cereal). That s why it helps to measure or weigh the food you eat. Because the food label values are based on servings, you ll need to know how many servings you eat at 1 sitting.   When you re planning for a snack or a meal, keep servings in mind. If you don t have measuring cups or a scale handy, there are ways to  eyeball  serving sizes, such as comparing your food to the size of your hand (see pictures below).      Ounces: 2 to 3 ounces is about the size of your palm.      1 cup: 1 cup (or a medium-sized piece) is about the size of your fist.      1/2 cup: 1/2 cup is about the size of your cupped hand.     Managing portion sizes   If your weight is a concern, reducing your portions can help. You can eat more than one serving of a food at once. But to keep from eating too much at one meal, learn how to manage your portions. A portion is the amount of each type of food on your plate.   [employer] last reviewed this educational content on 4/1/2019 2000-2021 [employer]. All rights reserved. This information is not intended as a substitute for professional medical care. Always follow your healthcare professional's instructions.

## 2021-12-29 LAB
AST SERPL W P-5'-P-CCNC: 51 U/L (ref 0–45)
CHOLEST SERPL-MCNC: 131 MG/DL
FASTING STATUS PATIENT QL REPORTED: ABNORMAL
HDLC SERPL-MCNC: 42 MG/DL
LDLC SERPL CALC-MCNC: 62 MG/DL
NONHDLC SERPL-MCNC: 89 MG/DL
TRIGL SERPL-MCNC: 133 MG/DL

## 2021-12-29 NOTE — RESULT ENCOUNTER NOTE
Iron levels are a bit low, this can cause restless leg symptoms .   Try feso4 (iron) 325mg once every other day for 4-6 months to see if leg symptoms improve.   If you are taking an acid reducing medication for heartburn you may need to add vitamin C 500-1000 mg to aide in absorption.   May need to add a stool softener if iron causes constipation.   Goal ferritin is >75. Recheck in 4-6 months  If unable to get levels up with oral iron, may want to consider IV iron infusion

## 2021-12-31 ENCOUNTER — TELEPHONE (OUTPATIENT)
Dept: FAMILY MEDICINE | Facility: CLINIC | Age: 51
End: 2021-12-31
Payer: COMMERCIAL

## 2021-12-31 LAB
BACTERIA UR CULT: ABNORMAL
BACTERIA UR CULT: ABNORMAL

## 2021-12-31 NOTE — TELEPHONE ENCOUNTER
Diabetes Education Scheduling Outreach #1:    Call to patient to schedule. Left message with phone number to call to schedule.    Plan for 2nd outreach attempt within 1 week.    Melissa Solis  [place] [facility]  Diabetes and Nutrition Scheduling

## 2022-01-04 NOTE — TELEPHONE ENCOUNTER
Diabetes Education Scheduling Outreach #2:    [employer] message sent to patient requesting to call to schedule.    Melissa [facility]  Diabetes and Nutrition Scheduling

## 2022-01-12 ENCOUNTER — MYC MEDICAL ADVICE (OUTPATIENT)
Dept: FAMILY MEDICINE | Facility: CLINIC | Age: 52
End: 2022-01-12
Payer: COMMERCIAL

## 2022-01-12 DIAGNOSIS — N39.0 E-COLI UTI: Primary | ICD-10-CM

## 2022-01-12 DIAGNOSIS — B96.20 E-COLI UTI: Primary | ICD-10-CM

## 2022-01-18 RX ORDER — SULFAMETHOXAZOLE/TRIMETHOPRIM 800-160 MG
1 TABLET ORAL 2 TIMES DAILY
Qty: 10 TABLET | Refills: 0 | Status: SHIPPED | OUTPATIENT
Start: 2022-01-18 | End: 2022-01-23

## 2022-02-03 ENCOUNTER — TELEPHONE (OUTPATIENT)
Dept: FAMILY MEDICINE | Facility: CLINIC | Age: 52
End: 2022-02-03
Payer: COMMERCIAL

## 2022-02-03 NOTE — TELEPHONE ENCOUNTER
Plan does not cover liraglutide (VICTOZA PEN) 18 MG/3ML solution.  Please call [phone] to initiate Prior Auth or change med.    Insurance type and ID number: Pharmacy did not provide ID#      Additional Information:     Gracie Sy

## 2022-02-07 ENCOUNTER — LAB (OUTPATIENT)
Dept: LAB | Facility: CLINIC | Age: 52
End: 2022-02-07
Payer: COMMERCIAL

## 2022-02-07 DIAGNOSIS — E11.65 TYPE 2 DIABETES MELLITUS WITH HYPERGLYCEMIA, WITHOUT LONG-TERM CURRENT USE OF INSULIN (H): ICD-10-CM

## 2022-02-07 DIAGNOSIS — E78.5 HYPERLIPIDEMIA LDL GOAL <100: ICD-10-CM

## 2022-02-07 DIAGNOSIS — R74.01 ELEVATED AST (SGOT): ICD-10-CM

## 2022-02-07 DIAGNOSIS — I10 ESSENTIAL HYPERTENSION WITH GOAL BLOOD PRESSURE LESS THAN 140/90: ICD-10-CM

## 2022-02-07 DIAGNOSIS — R79.89 ELEVATED LFTS: ICD-10-CM

## 2022-02-07 DIAGNOSIS — N89.8 VAGINAL ITCHING: ICD-10-CM

## 2022-02-07 LAB
ALBUMIN SERPL-MCNC: 3.8 G/DL (ref 3.4–5)
ALP SERPL-CCNC: 80 U/L (ref 40–150)
ALT SERPL W P-5'-P-CCNC: 125 U/L (ref 0–50)
ANION GAP SERPL CALCULATED.3IONS-SCNC: 7 MMOL/L (ref 3–14)
AST SERPL W P-5'-P-CCNC: 62 U/L (ref 0–45)
BILIRUB SERPL-MCNC: 0.6 MG/DL (ref 0.2–1.3)
BUN SERPL-MCNC: 17 MG/DL (ref 7–30)
CALCIUM SERPL-MCNC: 9.6 MG/DL (ref 8.5–10.1)
CHLORIDE BLD-SCNC: 103 MMOL/L (ref 94–109)
CO2 SERPL-SCNC: 27 MMOL/L (ref 20–32)
CREAT SERPL-MCNC: 0.74 MG/DL (ref 0.52–1.04)
GFR SERPL CREATININE-BSD FRML MDRD: >90 ML/MIN/1.73M2
GLUCOSE BLD-MCNC: 138 MG/DL (ref 70–99)
HBA1C MFR BLD: 7.7 % (ref 0–5.6)
POTASSIUM BLD-SCNC: 4 MMOL/L (ref 3.4–5.3)
PROT SERPL-MCNC: 8 G/DL (ref 6.8–8.8)
SODIUM SERPL-SCNC: 137 MMOL/L (ref 133–144)

## 2022-02-07 PROCEDURE — 87340 HEPATITIS B SURFACE AG IA: CPT

## 2022-02-07 PROCEDURE — 87086 URINE CULTURE/COLONY COUNT: CPT

## 2022-02-07 PROCEDURE — 36415 COLL VENOUS BLD VENIPUNCTURE: CPT

## 2022-02-07 PROCEDURE — 83036 HEMOGLOBIN GLYCOSYLATED A1C: CPT

## 2022-02-07 PROCEDURE — 80053 COMPREHEN METABOLIC PANEL: CPT

## 2022-02-07 PROCEDURE — 82977 ASSAY OF GGT: CPT

## 2022-02-08 DIAGNOSIS — E11.65 TYPE 2 DIABETES MELLITUS WITH HYPERGLYCEMIA, WITHOUT LONG-TERM CURRENT USE OF INSULIN (H): Primary | ICD-10-CM

## 2022-02-08 DIAGNOSIS — R79.89 ELEVATED LFTS: Primary | ICD-10-CM

## 2022-02-08 DIAGNOSIS — K76.0 FATTY LIVER: ICD-10-CM

## 2022-02-08 LAB
BACTERIA UR CULT: NORMAL
GGT SERPL-CCNC: 27 U/L (ref 0–40)

## 2022-02-09 LAB — HBV SURFACE AG SERPL QL IA: NONREACTIVE

## 2022-02-09 NOTE — TELEPHONE ENCOUNTER
Prior Authorization Approval    Authorization Effective Date: 2/5/2022  Authorization Expiration Date: 2/5/2023  Medication: liraglutide (VICTOZA PEN) 18 MG/3ML solution  Approved Dose/Quantity:    Reference #:     Insurance Company:    Expected CoPay:       CoPay Card Available:      Foundation Assistance Needed:    Which Pharmacy is filling the prescription (Not needed for infusion/clinic administered): [facility] [id number] [place] [address]  Pharmacy Notified: Yes  Patient Notified: Yes  **Instructed pharmacy to notify patient when script is ready to /ship.**

## 2022-02-09 NOTE — TELEPHONE ENCOUNTER
Central Prior Authorization Team   Phone: [phone]    PA Initiation    Medication: liraglutide (VICTOZA PEN) 18 MG/3ML solution  Insurance Company:    Pharm[facility]illing the [id number] [place] [facility] [address]  Filling Pharmacy Phone: [phone]  Filling Pharmacy Fax:    Start Date: 2/9/2022

## 2022-02-17 ENCOUNTER — ANCILLARY PROCEDURE (OUTPATIENT)
Dept: ULTRASOUND IMAGING | Facility: CLINIC | Age: 52
End: 2022-02-17
Attending: NURSE PRACTITIONER
Payer: COMMERCIAL

## 2022-02-17 DIAGNOSIS — R79.89 ELEVATED LFTS: ICD-10-CM

## 2022-02-17 PROCEDURE — 76705 ECHO EXAM OF ABDOMEN: CPT | Performed by: RADIOLOGY

## 2022-03-13 NOTE — TELEPHONE ENCOUNTER
RECORDS RECEIVED FROM: Internal   Appt Date: 04.01.2022   NOTES STATUS DETAILS   OFFICE NOTE from referring provider Internal 02.08.2022 Freya Painter APRN CNP   MEDICATION LIST Internal    IMAGING     ULTRASOUND LIVER Internal 02.17.2022 US ABDOMEN LIMITED    LABS     BASIC METABOLIC PANEL Internal 12.28.2021   COMPLETE METABOLIC PANEL Internal 02.04.2021   COMPLETE BLOOD COUNT (CBC) Internal 02.04.2021   HEPATITIS B SURFACE ANTIGEN Internal 02.07.2022

## 2022-04-01 ENCOUNTER — PRE VISIT (OUTPATIENT)
Dept: GASTROENTEROLOGY | Facility: CLINIC | Age: 52
End: 2022-04-01
Payer: COMMERCIAL

## 2022-04-16 ENCOUNTER — HEALTH MAINTENANCE LETTER (OUTPATIENT)
Age: 52
End: 2022-04-16

## 2022-06-28 ENCOUNTER — TELEPHONE (OUTPATIENT)
Dept: FAMILY MEDICINE | Facility: CLINIC | Age: 52
End: 2022-06-28

## 2022-06-28 ENCOUNTER — ANCILLARY PROCEDURE (OUTPATIENT)
Dept: MAMMOGRAPHY | Facility: CLINIC | Age: 52
End: 2022-06-28
Attending: FAMILY MEDICINE
Payer: COMMERCIAL

## 2022-06-28 DIAGNOSIS — Z12.31 VISIT FOR SCREENING MAMMOGRAM: ICD-10-CM

## 2022-06-28 PROCEDURE — 77067 SCR MAMMO BI INCL CAD: CPT | Performed by: RADIOLOGY

## 2022-06-28 PROCEDURE — 77063 BREAST TOMOSYNTHESIS BI: CPT | Performed by: RADIOLOGY

## 2022-06-28 NOTE — TELEPHONE ENCOUNTER
Patient Quality Outreach    Patient is due for the following:   Cervical Cancer Screening - PAP Needed    NEXT STEPS:   No follow up needed at this time. Patient had a PAP smear on 1/25/2018 and it is only recommended every 5 years.    Type of outreach:    Chart review performed, no outreach needed.      Questions for provider review:    None     Leslee Newby

## 2022-07-01 ENCOUNTER — PATIENT OUTREACH (OUTPATIENT)
Dept: FAMILY MEDICINE | Facility: CLINIC | Age: 52
End: 2022-07-01

## 2022-07-01 NOTE — LETTER
Dear Antonietta Christie    At [facility] we care about your health and are committed to providing quality patient care, which includes staying current on preventative cancer screenings.  You can increase your chances of finding and treating cancers through regular screenings.      Our records show that you are due for the following screening(s):      -Colon Cancer Screening- Recommended every 5-10 years, depending on your history, in order to prevent and detect colon cancer at its earliest stages.  Colon cancer is now the second leading cause of death in the [place] for both men and women and there are over 130,000 new cases and 50,000 deaths per year from colon cancer.  Colonoscopies can prevent 90-95% of these deaths.  Problem lesions can be removed before they ever become cancer.  This test is not only looking for cancer, but also getting rid of precancerious lesions.  You are usually given some sedation which makes the test very comfortable for most people.      If you do not wish to do a colonoscopy or cannot afford to do one, at this time, there is another option. It is called a FIT test or Fecal Immunochemical Occult Blood Test (take home stool sample kit).  It does not replace the colonoscopy for colorectal cancer screening, but it can detect hidden bleeding in the lower colon.  It does need to be repeated every year and if a positive result is obtained, you would be referred for a colonoscopy. The FIT test is really easy to do and does not require any diet or medication restrictions and involves only one collection sample.      If you have completed either one of these tests or had a flexible sigmoidoscopy in the past five years at another facility, please have the records sent to our clinic so that we can best coordinate your care.  Please call us ([phone]) if you have questions or would like arrange either to do a colonoscopy or obtain the necessary test kit for the Fecal Occult  Test.     You may contact the [facility] at [phone] to schedule the screening test(s) at your earliest convenience.    If you have already had one or all of the above screening tests at another facility, please call us so that we may update your chart.      Your partners in health,      Quality Committee   [facility]

## 2022-07-01 NOTE — TELEPHONE ENCOUNTER
Patient Quality Outreach    Patient is due for the following:   Colon Cancer Screening -  FIT and Colonoscopy    NEXT STEPS:   No follow up needed at this time.     Type of outreach:    Sent MyChart message. and Sent letter.      Questions for provider review:    None     Geovanna Urbina

## 2022-07-27 DIAGNOSIS — E66.01 MORBID OBESITY WITH BMI OF 50.0-59.9, ADULT (H): ICD-10-CM

## 2022-07-27 DIAGNOSIS — E11.65 TYPE 2 DIABETES MELLITUS WITH HYPERGLYCEMIA, WITHOUT LONG-TERM CURRENT USE OF INSULIN (H): ICD-10-CM

## 2022-07-27 RX ORDER — LIRAGLUTIDE 6 MG/ML
INJECTION SUBCUTANEOUS
Qty: 9 ML | Refills: 0 | Status: SHIPPED | OUTPATIENT
Start: 2022-07-27 | End: 2022-08-31

## 2022-07-27 NOTE — TELEPHONE ENCOUNTER
Routing refill request to provider for review/approval because:  Patient needs to be seen because:  No recent or future visit within authorizing provider's specialty

## 2022-08-28 DIAGNOSIS — E66.01 MORBID OBESITY WITH BMI OF 50.0-59.9, ADULT (H): ICD-10-CM

## 2022-08-28 DIAGNOSIS — E11.65 TYPE 2 DIABETES MELLITUS WITH HYPERGLYCEMIA, WITHOUT LONG-TERM CURRENT USE OF INSULIN (H): ICD-10-CM

## 2022-08-28 NOTE — LETTER
September 2, 2022          Antonietta Christie  [address] [address] [facility] [address]            Dear Antonietta Christie,      At [facility] we care about your health and are committed to providing quality patient care. Regular appointments are a vital part of the care and management of your health and can help prevent many of the complications that can occur.      It has come to our attention that you are due for Diabetic check and lab work.  Please call [facility] at [phone] soon to schedule your follow up appointment.        Sincerely,      [facility] Care Team

## 2022-08-31 RX ORDER — LIRAGLUTIDE 6 MG/ML
INJECTION SUBCUTANEOUS
Qty: 9 ML | Refills: 0 | Status: SHIPPED | OUTPATIENT
Start: 2022-08-31 | End: 2022-10-10

## 2022-09-02 NOTE — TELEPHONE ENCOUNTER
LVM for pt to schedule appointment and sent letter.  If/ when patient calls back please assist with scheduling appointment.

## 2022-10-09 DIAGNOSIS — E11.65 TYPE 2 DIABETES MELLITUS WITH HYPERGLYCEMIA, WITHOUT LONG-TERM CURRENT USE OF INSULIN (H): ICD-10-CM

## 2022-10-09 DIAGNOSIS — E66.01 MORBID OBESITY WITH BMI OF 50.0-59.9, ADULT (H): ICD-10-CM

## 2022-10-10 RX ORDER — LIRAGLUTIDE 6 MG/ML
INJECTION SUBCUTANEOUS
Qty: 9 ML | Refills: 0 | Status: SHIPPED | OUTPATIENT
Start: 2022-10-10 | End: 2022-12-05

## 2022-10-11 NOTE — TELEPHONE ENCOUNTER
Called and left a voicemail message to schedule an office visit for further refills.  Aisha Xavier MA  [facility]  2nd Floor  Primary Care

## 2022-10-22 ENCOUNTER — HEALTH MAINTENANCE LETTER (OUTPATIENT)
Age: 52
End: 2022-10-22

## 2022-11-22 DIAGNOSIS — E78.5 HYPERLIPIDEMIA LDL GOAL <100: Primary | Chronic | ICD-10-CM

## 2022-11-22 DIAGNOSIS — E11.65 TYPE 2 DIABETES MELLITUS WITH HYPERGLYCEMIA, WITHOUT LONG-TERM CURRENT USE OF INSULIN (H): ICD-10-CM

## 2022-11-22 DIAGNOSIS — E66.01 MORBID OBESITY WITH BMI OF 50.0-59.9, ADULT (H): ICD-10-CM

## 2022-11-22 DIAGNOSIS — Z13.29 SCREENING FOR THYROID DISORDER: ICD-10-CM

## 2022-11-22 RX ORDER — LIRAGLUTIDE 6 MG/ML
INJECTION SUBCUTANEOUS
OUTPATIENT
Start: 2022-11-22

## 2022-11-22 NOTE — LETTER
November 28, 2022          Antonietta Christie  [address] [address] [facility] [address]            Dear Antonietta Christie,      At [facility] we care about your health and are committed to providing quality patient care. Regular appointments are a vital part of the care and management of your health and can help prevent many of the complications that can occur.      It has come to our attention that you are due for an office visit and lab work before further refills.  Please call [facility] at [phone] soon to schedule your follow up appointment.          Sincerely,      [facility] Care Team

## 2022-11-28 NOTE — TELEPHONE ENCOUNTER
Sent letter . If / when patient calls please assist with scheduling office visit / lab appointment.

## 2022-12-01 NOTE — TELEPHONE ENCOUNTER
Patient has appt scheduled 12/21 virtually and there are no available appts prior to the patient running out of medication. Patient is looking for miah refill until appointment or can we take same day to get patient in earlier. Patient is expected to run out in 5-7 days.

## 2022-12-05 RX ORDER — LIRAGLUTIDE 6 MG/ML
INJECTION SUBCUTANEOUS
Qty: 9 ML | Refills: 0 | Status: SHIPPED | OUTPATIENT
Start: 2022-12-05 | End: 2022-12-21

## 2022-12-05 NOTE — TELEPHONE ENCOUNTER
Please find out which medications she needs and route back to me.   she is to keep her appt on 12/21/22.  Freya URIBE, CNP

## 2022-12-05 NOTE — TELEPHONE ENCOUNTER
Routing to team to call pt and see what meds she needs refilled and which pharmacy she would like the filled at. Please route to bk refill.     Roselia Lacy RN  [facility]

## 2022-12-06 ENCOUNTER — TELEPHONE (OUTPATIENT)
Dept: FAMILY MEDICINE | Facility: CLINIC | Age: 52
End: 2022-12-06

## 2022-12-06 NOTE — TELEPHONE ENCOUNTER
Patient Quality Outreach    Patient is due for the following:   Diabetes -  A1C, LDL (Fasting), Eye Exam, Microalbumin, Diabetic Follow-Up Visit and Foot Exam  Colon Cancer Screening    Next Steps:   Schedule a office visit for diabetic follow up    Type of outreach:    Phone, left message for patient/parent to call back.    Next Steps:  Reach out within 90 days via Phone.    Max number of attempts reached: No. Will try again in 90 days if patient still on fail list.    Questions for provider review:    None     Leslee Newby

## 2022-12-15 ENCOUNTER — LAB (OUTPATIENT)
Dept: LAB | Facility: CLINIC | Age: 52
End: 2022-12-15
Payer: COMMERCIAL

## 2022-12-15 ENCOUNTER — IMMUNIZATION (OUTPATIENT)
Dept: NURSING | Facility: CLINIC | Age: 52
End: 2022-12-15
Payer: COMMERCIAL

## 2022-12-15 DIAGNOSIS — Z13.29 SCREENING FOR THYROID DISORDER: ICD-10-CM

## 2022-12-15 DIAGNOSIS — E11.65 TYPE 2 DIABETES MELLITUS WITH HYPERGLYCEMIA, WITHOUT LONG-TERM CURRENT USE OF INSULIN (H): ICD-10-CM

## 2022-12-15 DIAGNOSIS — E78.5 HYPERLIPIDEMIA LDL GOAL <100: Chronic | ICD-10-CM

## 2022-12-15 LAB
ALBUMIN SERPL-MCNC: 3.9 G/DL (ref 3.4–5)
ALP SERPL-CCNC: 85 U/L (ref 40–150)
ALT SERPL W P-5'-P-CCNC: 101 U/L (ref 0–50)
ANION GAP SERPL CALCULATED.3IONS-SCNC: 7 MMOL/L (ref 3–14)
AST SERPL W P-5'-P-CCNC: 41 U/L (ref 0–45)
BILIRUB SERPL-MCNC: 0.6 MG/DL (ref 0.2–1.3)
BUN SERPL-MCNC: 13 MG/DL (ref 7–30)
CALCIUM SERPL-MCNC: 9.6 MG/DL (ref 8.5–10.1)
CHLORIDE BLD-SCNC: 103 MMOL/L (ref 94–109)
CHOLEST SERPL-MCNC: 123 MG/DL
CO2 SERPL-SCNC: 27 MMOL/L (ref 20–32)
CREAT SERPL-MCNC: 0.69 MG/DL (ref 0.52–1.04)
CREAT UR-MCNC: 77 MG/DL
FASTING STATUS PATIENT QL REPORTED: YES
GFR SERPL CREATININE-BSD FRML MDRD: >90 ML/MIN/1.73M2
GLUCOSE BLD-MCNC: 137 MG/DL (ref 70–99)
HBA1C MFR BLD: 7 % (ref 0–5.6)
HDLC SERPL-MCNC: 46 MG/DL
LDLC SERPL CALC-MCNC: 43 MG/DL
MICROALBUMIN UR-MCNC: 27 MG/L
MICROALBUMIN/CREAT UR: 35.06 MG/G CR (ref 0–25)
NONHDLC SERPL-MCNC: 77 MG/DL
POTASSIUM BLD-SCNC: 4.3 MMOL/L (ref 3.4–5.3)
PROT SERPL-MCNC: 8.2 G/DL (ref 6.8–8.8)
SODIUM SERPL-SCNC: 137 MMOL/L (ref 133–144)
TRIGL SERPL-MCNC: 168 MG/DL
TSH SERPL DL<=0.005 MIU/L-ACNC: 3.44 MU/L (ref 0.4–4)

## 2022-12-15 PROCEDURE — 80061 LIPID PANEL: CPT

## 2022-12-15 PROCEDURE — 36415 COLL VENOUS BLD VENIPUNCTURE: CPT

## 2022-12-15 PROCEDURE — 80053 COMPREHEN METABOLIC PANEL: CPT

## 2022-12-15 PROCEDURE — 82043 UR ALBUMIN QUANTITATIVE: CPT

## 2022-12-15 PROCEDURE — 83036 HEMOGLOBIN GLYCOSYLATED A1C: CPT

## 2022-12-15 PROCEDURE — 90471 IMMUNIZATION ADMIN: CPT

## 2022-12-15 PROCEDURE — 84443 ASSAY THYROID STIM HORMONE: CPT

## 2022-12-15 PROCEDURE — 90682 RIV4 VACC RECOMBINANT DNA IM: CPT

## 2022-12-19 DIAGNOSIS — E11.65 TYPE 2 DIABETES MELLITUS WITH HYPERGLYCEMIA, WITHOUT LONG-TERM CURRENT USE OF INSULIN (H): Primary | Chronic | ICD-10-CM

## 2022-12-20 ENCOUNTER — TELEPHONE (OUTPATIENT)
Dept: FAMILY MEDICINE | Facility: CLINIC | Age: 52
End: 2022-12-20

## 2022-12-20 NOTE — TELEPHONE ENCOUNTER
Diabetes Education Scheduling Outreach #1:    Call to patient to schedule. Left message with phone number to call to schedule.    Also sent Octavian message for second attempt. Requested patient to call to schedule.    Melissa Granda OnCall  Diabetes and Nutrition Scheduling

## 2022-12-21 ENCOUNTER — VIRTUAL VISIT (OUTPATIENT)
Dept: FAMILY MEDICINE | Facility: CLINIC | Age: 52
End: 2022-12-21
Payer: COMMERCIAL

## 2022-12-21 DIAGNOSIS — G25.81 RESTLESS LEGS SYNDROME (RLS): ICD-10-CM

## 2022-12-21 DIAGNOSIS — I10 ESSENTIAL HYPERTENSION WITH GOAL BLOOD PRESSURE LESS THAN 140/90: ICD-10-CM

## 2022-12-21 DIAGNOSIS — E11.65 TYPE 2 DIABETES MELLITUS WITH HYPERGLYCEMIA, WITHOUT LONG-TERM CURRENT USE OF INSULIN (H): Primary | ICD-10-CM

## 2022-12-21 DIAGNOSIS — E66.01 MORBID OBESITY WITH BMI OF 50.0-59.9, ADULT (H): ICD-10-CM

## 2022-12-21 DIAGNOSIS — E78.5 HYPERLIPIDEMIA LDL GOAL <100: Chronic | ICD-10-CM

## 2022-12-21 DIAGNOSIS — K21.9 GASTROESOPHAGEAL REFLUX DISEASE, UNSPECIFIED WHETHER ESOPHAGITIS PRESENT: Chronic | ICD-10-CM

## 2022-12-21 PROCEDURE — 99214 OFFICE O/P EST MOD 30 MIN: CPT | Mod: 95 | Performed by: NURSE PRACTITIONER

## 2022-12-21 RX ORDER — BLOOD SUGAR DIAGNOSTIC
STRIP MISCELLANEOUS
Qty: 100 STRIP | Refills: 5 | Status: SHIPPED | OUTPATIENT
Start: 2022-12-21 | End: 2024-06-21

## 2022-12-21 RX ORDER — ROPINIROLE 5 MG/1
TABLET, FILM COATED ORAL
Qty: 90 TABLET | Refills: 3 | Status: SHIPPED | OUTPATIENT
Start: 2022-12-21 | End: 2023-09-21

## 2022-12-21 RX ORDER — GABAPENTIN 300 MG/1
CAPSULE ORAL
Qty: 180 CAPSULE | Refills: 3 | Status: SHIPPED | OUTPATIENT
Start: 2022-12-21 | End: 2024-03-06

## 2022-12-21 RX ORDER — METFORMIN HCL 500 MG
2000 TABLET, EXTENDED RELEASE 24 HR ORAL
Qty: 360 TABLET | Refills: 3 | Status: SHIPPED | OUTPATIENT
Start: 2022-12-21 | End: 2024-01-11

## 2022-12-21 RX ORDER — ATORVASTATIN CALCIUM 10 MG/1
10 TABLET, FILM COATED ORAL DAILY
Qty: 90 TABLET | Refills: 3 | Status: SHIPPED | OUTPATIENT
Start: 2022-12-21 | End: 2023-09-21

## 2022-12-21 RX ORDER — LOSARTAN POTASSIUM 100 MG/1
100 TABLET ORAL DAILY
Qty: 90 TABLET | Refills: 3 | Status: SHIPPED | OUTPATIENT
Start: 2022-12-21 | End: 2023-09-21

## 2022-12-21 RX ORDER — LIRAGLUTIDE 6 MG/ML
INJECTION SUBCUTANEOUS
Qty: 18 ML | Refills: 3 | Status: SHIPPED | OUTPATIENT
Start: 2022-12-21 | End: 2023-08-29

## 2022-12-21 NOTE — PROGRESS NOTES
Antonietta is a 52 year old who is being evaluated via a billable telephone visit.      What phone number would you like to be contacted at? [phone]  How would you like to obtain your AVS? Sammy  Distant Location (provider location):  On-site    Assessment & Plan     Type 2 diabetes mellitus with hyperglycemia, without long-term current use of insulin (H)  Controlled, no change in medications today, she has been referred to CDE for work on diet  For weight loss/glycemic control, heart health    Morbid obesity with BMI of 50.0-59.9, adult (H)  Benefits of weight loss reviewed in detail, encouraged her to cut back on the carbohydrates in the diet, consume more fruits and vegetables, drink plenty of water, avoid fruit juices, sodas, get 150 min moderate exercise/week. She is not exercising regularly- recommended she start walking 20-30 min most days of the week.  Recheck weight in 6 months.        Restless legs syndrome (RLS)  Continue Gabapoentin controlled    Essential hypertension with goal blood pressure less than 140/90  BP above goal based on clinic BP's but she states when she checks at home, her BP's are normal.    Gastroesophageal reflux disease, unspecified whether esophagitis present  Stable on Prilosec.    Hyperlipidemia LDL goal <100  On Lipitor, tolerating well,LDL is at goal  Patient declines statin    Prescription drug management  234 minutes spent on the date of the encounter doing chart review, history and exam, documentation and further activities per the note       Work on weight loss  Regular exercise  See Patient Instructions    No follow-ups on file.    RONY Kim [facility]    Sarah Mane is a 52 year old  presenting for the following health issues:  Recheck Medication, Medication Refill, and Diabetes      History of Present Illness       Reason for visit:  Prescription refills check    She eats 0-1 servings of fruits and vegetables  daily.She consumes 1 sweetened beverage(s) daily.She exercises with enough effort to increase her heart rate 9 or less minutes per day.  She exercises with enough effort to increase her heart rate 3 or less days per week.   She is taking medications regularly.       Diabetes Follow-up      How often are you checking your blood sugar? Not at all    What concerns do you have today about your diabetes? None     Do you have any of these symptoms? (Select all that apply)  No numbness or tingling in feet.  No redness, sores or blisters on feet.  No complaints of excessive thirst.  No reports of blurry vision.  No significant changes to weight.    Have you had a diabetic eye exam in the last 12 months? No, has one scheduled in Jan 2023        BP Readings from Last 2 Encounters:   12/28/21 (!) 140/92   02/04/21 136/88     Hemoglobin A1C (%)   Date Value   12/15/2022 7.0 (H)   02/07/2022 7.7 (H)   02/04/2021 9.2 (H)   01/16/2020 6.0 (H)     LDL Cholesterol Calculated (mg/dL)   Date Value   12/15/2022 43   12/28/2021 62   02/04/2021 64   01/16/2020 76         Hypertension Follow-up      Do you check your blood pressure regularly outside of the clinic? No     Are you following a low salt diet? Yes    Are your blood pressures ever more than 140 on the top number (systolic) OR more   than 90 on the bottom number (diastolic), for example 140/90?     CHELO-  Using CPAP but is interested in getting another machine, states her machine is old, big, bulky.    RLS- well controlled on Gbapentin- takes 600 mg Neurontin at [facility] with good symptom relief.    HLD- on Lipitor and tolerating well, LDL is at goal.    Review of Systems   Constitutional, HEENT, cardiovascular, pulmonary, gi and gu systems are negative, except as otherwise noted.      Objective           Vitals:  No vitals were obtained today due to virtual visit.    Physical Exam   healthy, alert and no distress  PSYCH: Alert and oriented times 3; coherent speech, normal   rate and  volume, able to articulate logical thoughts, able   to abstract reason, no tangential thoughts, no hallucinations   or delusions  Her affect is normal  RESP: No cough, no audible wheezing, able to talk in full sentences  Remainder of exam unable to be completed due to telephone visits      Component      Latest Ref Rng & Units 12/15/2022   Sodium      133 - 144 mmol/L 137   Potassium      3.4 - 5.3 mmol/L 4.3   Chloride      94 - 109 mmol/L 103   Carbon Dioxide      20 - 32 mmol/L 27   Anion Gap      3 - 14 mmol/L 7   Urea Nitrogen      7 - 30 mg/dL 13   Creatinine      0.52 - 1.04 mg/dL 0.69   Calcium      8.5 - 10.1 mg/dL 9.6   Glucose      70 - 99 mg/dL 137 (H)   Alkaline Phosphatase      40 - 150 U/L 85   AST      0 - 45 U/L 41   ALT      0 - 50 U/L 101 (H)   Protein Total      6.8 - 8.8 g/dL 8.2   Albumin      3.4 - 5.0 g/dL 3.9   Bilirubin Total      0.2 - 1.3 mg/dL 0.6   GFR Estimate      >60 mL/min/1.73m2 >90   Cholesterol      <200 mg/dL 123   Triglycerides      <150 mg/dL 168 (H)   HDL Cholesterol      >=50 mg/dL 46 (L)   LDL Cholesterol Calculated      <=100 mg/dL 43   Non HDL Cholesterol      <130 mg/dL 77   Patient Fasting > 8hrs?       Yes   Creatinine Urine      mg/dL 77   Albumin Urine mg/L      mg/L 27   Albumin Urine mg/g Cr      0.00 - 25.00 mg/g Cr 35.06 (H)   Hemoglobin A1C      0.0 - 5.6 % 7.0 (H)   TSH      0.40 - 4.00 mU/L 3.44             Phone call duration: 20 minutes

## 2022-12-28 ENCOUNTER — TRANSFERRED RECORDS (OUTPATIENT)
Dept: MULTI SPECIALTY CLINIC | Facility: CLINIC | Age: 52
End: 2022-12-28

## 2022-12-28 LAB — RETINOPATHY: NORMAL

## 2023-01-23 ENCOUNTER — TELEPHONE (OUTPATIENT)
Dept: FAMILY MEDICINE | Facility: CLINIC | Age: 53
End: 2023-01-23
Payer: COMMERCIAL

## 2023-01-23 NOTE — TELEPHONE ENCOUNTER
Called and spoke to the patient and gave her Christine's message. Patient understands.  Aisha Xavier MA  [facility]  2nd Floor  Primary Care

## 2023-01-23 NOTE — TELEPHONE ENCOUNTER
Plan does not cover omeprazole (PRILOSEC) 20 MG DR capsule.  Please call not given to initiate Prior Auth or switch to alternative medication.    Insurance type and ID number: not given      Additional Information:     Gracie Sy

## 2023-05-04 ENCOUNTER — TELEPHONE (OUTPATIENT)
Dept: FAMILY MEDICINE | Facility: CLINIC | Age: 53
End: 2023-05-04
Payer: COMMERCIAL

## 2023-05-04 NOTE — TELEPHONE ENCOUNTER
Patient Quality Outreach    Patient is due for the following:   Diabetes -  Foot Exam  Colon Cancer Screening  Cervical Cancer Screening - PAP Needed  Physical Preventive Adult Physical      Topic Date Due     Hepatitis B Vaccine (1 of 3 - 3-dose series) Never done     Pneumococcal Vaccine (2 - PCV) 02/18/2017       Next Steps:   Schedule a nurse only visit for colonoscopy Adult Preventative    Type of outreach:    Sent [employer] message.      Questions for provider review:    None           Con Courtney

## 2023-05-30 ENCOUNTER — PATIENT OUTREACH (OUTPATIENT)
Dept: CARE COORDINATION | Facility: CLINIC | Age: 53
End: 2023-05-30
Payer: COMMERCIAL

## 2023-06-01 ENCOUNTER — HEALTH MAINTENANCE LETTER (OUTPATIENT)
Age: 53
End: 2023-06-01

## 2023-06-18 ENCOUNTER — HEALTH MAINTENANCE LETTER (OUTPATIENT)
Age: 53
End: 2023-06-18

## 2023-08-07 ENCOUNTER — TELEPHONE (OUTPATIENT)
Dept: FAMILY MEDICINE | Facility: CLINIC | Age: 53
End: 2023-08-07
Payer: COMMERCIAL

## 2023-08-07 NOTE — TELEPHONE ENCOUNTER
Patient Quality Outreach    Patient is due for the following:   Diabetes -  A1C, Eye Exam, and Foot Exam  Colon Cancer Screening  Breast Cancer Screening - Mammogram  Cervical Cancer Screening - PAP Needed  Physical Preventive Adult Physical      Topic Date Due    Hepatitis B Vaccine (1 of 3 - 3-dose series) Never done    Pneumococcal Vaccine (2 - PCV) 02/18/2017       Next Steps:   Schedule a nurse only visit for colonoscopy, and mammogram Adult Preventative    Type of outreach:    Sent [employer] message.      Questions for provider review:    None           Con Courtney

## 2023-08-09 ENCOUNTER — ANCILLARY PROCEDURE (OUTPATIENT)
Dept: MAMMOGRAPHY | Facility: CLINIC | Age: 53
End: 2023-08-09
Attending: FAMILY MEDICINE
Payer: COMMERCIAL

## 2023-08-09 DIAGNOSIS — Z12.31 VISIT FOR SCREENING MAMMOGRAM: ICD-10-CM

## 2023-08-09 PROCEDURE — 77067 SCR MAMMO BI INCL CAD: CPT | Mod: GC

## 2023-08-09 PROCEDURE — 77063 BREAST TOMOSYNTHESIS BI: CPT | Mod: GC

## 2023-08-26 DIAGNOSIS — E66.01 MORBID OBESITY WITH BMI OF 50.0-59.9, ADULT (H): ICD-10-CM

## 2023-08-26 DIAGNOSIS — E78.5 HYPERLIPIDEMIA LDL GOAL <100: Primary | Chronic | ICD-10-CM

## 2023-08-26 DIAGNOSIS — E11.65 TYPE 2 DIABETES MELLITUS WITH HYPERGLYCEMIA, WITHOUT LONG-TERM CURRENT USE OF INSULIN (H): ICD-10-CM

## 2023-08-29 RX ORDER — LIRAGLUTIDE 6 MG/ML
INJECTION SUBCUTANEOUS
Qty: 9 ML | Refills: 0 | Status: SHIPPED | OUTPATIENT
Start: 2023-08-29 | End: 2023-09-21

## 2023-08-29 NOTE — TELEPHONE ENCOUNTER
Called and left a voicemail message regarding a miah refill sent to the patient's pharmacy and to return our call to schedule a visit for further refills.  Aisha Xavier [facility]  2nd Floor  Primary Care

## 2023-09-20 NOTE — TELEPHONE ENCOUNTER
"Requested Prescriptions   Pending Prescriptions Disp Refills     rOPINIRole (REQUIP) 5 MG tablet [Pharmacy Med Name: ROPINIROLE HCL 5 MG TABLET]    Last Written Prescription Date:  8/29/17  Last Fill Quantity: 90,  # refills: 2   Last Office Visit with G, P or [facility] prescribing provider:  2/19/18   Future Office Visit:      90 tablet 2     Sig: TAKE ONE TABLET BY MOUTH AT BEDTIME AS NEEDED FOR RESTLESS LEGS    Antiparkinson's Agents Protocol Passed    5/4/2018  1:38 PM       Passed - Blood pressure under 140/90 in past 12 months    BP Readings from Last 3 Encounters:   02/19/18 134/86   01/31/18 150/88   06/14/17 128/80                Passed - Recent (12 mo) or future (30 days) visit within the authorizing provider's specialty    Patient had office visit in the last 12 months or has a visit in the next 30 days with authorizing provider or within the authorizing provider's specialty.  See \"Patient Info\" tab in inbasket, or \"Choose Columns\" in Meds & Orders section of the refill encounter.           Passed - Patient is age 18 or older       Passed - No active pregnancy on record       Passed - No positive pregnancy test in the past 12 months              Jayson Faarax  [facility]  " Surgeon Performing The Repair (Optional): Dr. Kevin Garcia

## 2023-09-21 ENCOUNTER — OFFICE VISIT (OUTPATIENT)
Dept: FAMILY MEDICINE | Facility: CLINIC | Age: 53
End: 2023-09-21
Payer: COMMERCIAL

## 2023-09-21 VITALS
RESPIRATION RATE: 20 BRPM | TEMPERATURE: 97.5 F | SYSTOLIC BLOOD PRESSURE: 167 MMHG | WEIGHT: 293 LBS | BODY MASS INDEX: 43.4 KG/M2 | HEART RATE: 75 BPM | OXYGEN SATURATION: 96 % | HEIGHT: 69 IN | DIASTOLIC BLOOD PRESSURE: 93 MMHG

## 2023-09-21 DIAGNOSIS — E78.5 HYPERLIPIDEMIA LDL GOAL <70: ICD-10-CM

## 2023-09-21 DIAGNOSIS — E66.01 MORBID OBESITY WITH BMI OF 50.0-59.9, ADULT (H): ICD-10-CM

## 2023-09-21 DIAGNOSIS — N89.8 VAGINAL ITCHING: ICD-10-CM

## 2023-09-21 DIAGNOSIS — G25.81 RESTLESS LEGS SYNDROME (RLS): ICD-10-CM

## 2023-09-21 DIAGNOSIS — E11.65 TYPE 2 DIABETES MELLITUS WITH HYPERGLYCEMIA, WITHOUT LONG-TERM CURRENT USE OF INSULIN (H): Primary | ICD-10-CM

## 2023-09-21 DIAGNOSIS — I10 ESSENTIAL HYPERTENSION WITH GOAL BLOOD PRESSURE LESS THAN 140/90: ICD-10-CM

## 2023-09-21 DIAGNOSIS — Z12.12 SCREENING FOR MALIGNANT NEOPLASM OF THE RECTUM: ICD-10-CM

## 2023-09-21 LAB
ALBUMIN SERPL BCG-MCNC: 4.3 G/DL (ref 3.5–5.2)
ALBUMIN UR-MCNC: NEGATIVE MG/DL
ALP SERPL-CCNC: 82 U/L (ref 35–104)
ALT SERPL W P-5'-P-CCNC: 108 U/L (ref 0–50)
ANION GAP SERPL CALCULATED.3IONS-SCNC: 15 MMOL/L (ref 7–15)
APPEARANCE UR: CLEAR
AST SERPL W P-5'-P-CCNC: 54 U/L (ref 0–45)
BACTERIA #/AREA URNS HPF: ABNORMAL /HPF
BILIRUB SERPL-MCNC: 0.3 MG/DL
BILIRUB UR QL STRIP: NEGATIVE
BUN SERPL-MCNC: 16.8 MG/DL (ref 6–20)
CALCIUM SERPL-MCNC: 10.3 MG/DL (ref 8.6–10)
CHLORIDE SERPL-SCNC: 109 MMOL/L (ref 98–107)
CHOLEST SERPL-MCNC: 145 MG/DL
CLUE CELLS: ABNORMAL
COLOR UR AUTO: YELLOW
CREAT SERPL-MCNC: 0.78 MG/DL (ref 0.51–0.95)
CREAT UR-MCNC: 75.3 MG/DL
DEPRECATED HCO3 PLAS-SCNC: 24 MMOL/L (ref 22–29)
EGFRCR SERPLBLD CKD-EPI 2021: 90 ML/MIN/1.73M2
FERRITIN SERPL-MCNC: 79 NG/ML (ref 11–328)
GLUCOSE SERPL-MCNC: 171 MG/DL (ref 70–99)
GLUCOSE UR STRIP-MCNC: NEGATIVE MG/DL
HBA1C MFR BLD: 8.5 % (ref 0–5.6)
HDLC SERPL-MCNC: 42 MG/DL
HGB UR QL STRIP: NEGATIVE
KETONES UR STRIP-MCNC: NEGATIVE MG/DL
LDLC SERPL CALC-MCNC: 52 MG/DL
LEUKOCYTE ESTERASE UR QL STRIP: ABNORMAL
MICROALBUMIN UR-MCNC: <12 MG/L
MICROALBUMIN/CREAT UR: NORMAL MG/G{CREAT}
MUCOUS THREADS #/AREA URNS LPF: PRESENT /LPF
NITRATE UR QL: NEGATIVE
NONHDLC SERPL-MCNC: 103 MG/DL
PH UR STRIP: 5.5 [PH] (ref 5–7)
POTASSIUM SERPL-SCNC: 4.6 MMOL/L (ref 3.4–5.3)
PROT SERPL-MCNC: 7.4 G/DL (ref 6.4–8.3)
RBC #/AREA URNS AUTO: ABNORMAL /HPF
SODIUM SERPL-SCNC: 148 MMOL/L (ref 136–145)
SP GR UR STRIP: 1.02 (ref 1–1.03)
SQUAMOUS #/AREA URNS AUTO: ABNORMAL /LPF
TRICHOMONAS, WET PREP: ABNORMAL
TRIGL SERPL-MCNC: 257 MG/DL
UROBILINOGEN UR STRIP-ACNC: 0.2 E.U./DL
WBC #/AREA URNS AUTO: ABNORMAL /HPF
WBC'S/HIGH POWER FIELD, WET PREP: ABNORMAL
YEAST, WET PREP: ABNORMAL

## 2023-09-21 PROCEDURE — 82043 UR ALBUMIN QUANTITATIVE: CPT | Performed by: PHYSICIAN ASSISTANT

## 2023-09-21 PROCEDURE — 36415 COLL VENOUS BLD VENIPUNCTURE: CPT | Performed by: PHYSICIAN ASSISTANT

## 2023-09-21 PROCEDURE — 83036 HEMOGLOBIN GLYCOSYLATED A1C: CPT | Performed by: PHYSICIAN ASSISTANT

## 2023-09-21 PROCEDURE — 99214 OFFICE O/P EST MOD 30 MIN: CPT | Performed by: PHYSICIAN ASSISTANT

## 2023-09-21 PROCEDURE — 81001 URINALYSIS AUTO W/SCOPE: CPT | Performed by: PHYSICIAN ASSISTANT

## 2023-09-21 PROCEDURE — 80053 COMPREHEN METABOLIC PANEL: CPT | Performed by: PHYSICIAN ASSISTANT

## 2023-09-21 PROCEDURE — 99207 PR FOOT EXAM NO CHARGE: CPT | Performed by: PHYSICIAN ASSISTANT

## 2023-09-21 PROCEDURE — 87210 SMEAR WET MOUNT SALINE/INK: CPT | Performed by: PHYSICIAN ASSISTANT

## 2023-09-21 PROCEDURE — 87086 URINE CULTURE/COLONY COUNT: CPT | Performed by: PHYSICIAN ASSISTANT

## 2023-09-21 PROCEDURE — 82728 ASSAY OF FERRITIN: CPT | Performed by: PHYSICIAN ASSISTANT

## 2023-09-21 PROCEDURE — 80061 LIPID PANEL: CPT | Performed by: PHYSICIAN ASSISTANT

## 2023-09-21 PROCEDURE — 82570 ASSAY OF URINE CREATININE: CPT | Performed by: PHYSICIAN ASSISTANT

## 2023-09-21 RX ORDER — LIRAGLUTIDE 6 MG/ML
INJECTION SUBCUTANEOUS
Qty: 9 ML | Refills: 0 | Status: SHIPPED | OUTPATIENT
Start: 2023-09-21 | End: 2023-11-16

## 2023-09-21 RX ORDER — ROPINIROLE 5 MG/1
TABLET, FILM COATED ORAL
Qty: 90 TABLET | Refills: 1 | Status: SHIPPED | OUTPATIENT
Start: 2023-09-21 | End: 2024-02-01

## 2023-09-21 RX ORDER — HYDROCHLOROTHIAZIDE 12.5 MG/1
12.5 TABLET ORAL DAILY
Qty: 30 TABLET | Refills: 0 | Status: SHIPPED | OUTPATIENT
Start: 2023-09-21 | End: 2023-10-17

## 2023-09-21 RX ORDER — LOSARTAN POTASSIUM 100 MG/1
100 TABLET ORAL DAILY
Qty: 90 TABLET | Refills: 1 | Status: SHIPPED | OUTPATIENT
Start: 2023-09-21 | End: 2024-01-11

## 2023-09-21 RX ORDER — ATORVASTATIN CALCIUM 10 MG/1
10 TABLET, FILM COATED ORAL DAILY
Qty: 90 TABLET | Refills: 1 | Status: SHIPPED | OUTPATIENT
Start: 2023-09-21 | End: 2024-01-11

## 2023-09-21 ASSESSMENT — PAIN SCALES - GENERAL: PAINLEVEL: NO PAIN (0)

## 2023-09-21 NOTE — PROGRESS NOTES
"  Assessment & Plan       ICD-10-CM    1. Type 2 diabetes mellitus with hyperglycemia, without long-term current use of insulin (H)  E11.65 Comprehensive metabolic panel (BMP + Alb, Alk Phos, ALT, AST, Total. Bili, TP)     Hemoglobin A1c     FOOT EXAM     atorvastatin (LIPITOR) 10 MG tablet     liraglutide (VICTOZA PEN) 18 MG/3ML solution     losartan (COZAAR) 100 MG tablet      2. Essential hypertension with goal blood pressure less than 140/90  I10 hydrochlorothiazide (HYDRODIURIL) 12.5 MG tablet     losartan (COZAAR) 100 MG tablet      3. Hyperlipidemia LDL goal <70  E78.5 Lipid panel reflex to direct LDL Fasting      4. Restless legs syndrome (RLS)  G25.81 rOPINIRole (REQUIP) 5 MG tablet     Ferritin      5. Vaginal itching  N89.8 UA Macroscopic with reflex to Microscopic and Culture - Lab Collect     Wet prep - lab collect      6. Morbid obesity with BMI of 50.0-59.9, adult (H)  E66.01 liraglutide (VICTOZA PEN) 18 MG/3ML solution    Z68.43 Adult Comprehensive Weight Management  Referral      7. Screening for malignant neoplasm of the rectum  Z12.12 Colonoscopy Screening  Referral      medical conditions are stable. meds refilled. Will hold Jardiance for now due to possible groin itching.   Will add hydrochlorothiazide 12.5mg daily and recheck 4 wks.   medical conditions are stable. meds refilled. Labs pending  medical conditions are stable. meds refilled.   Labs pending- recommend follow up  with for physical.   Referral placed.     BMI:   Estimated body mass index is 52.42 kg/m  as calculated from the following:    Height as of this encounter: 1.753 m (5' 9\").    Weight as of this encounter: 161 kg (355 lb).   Weight management plan: Patient referred to endocrine and/or weight management specialty    [facility]    Sarah Mane is a 53 year old, presenting for the following health issues:  Diabetes        9/21/2023     3:44 PM " "  Additional Questions   Roomed by Hank       History of Present Illness       Diabetes:   She presents for follow up of diabetes.    She is not checking blood glucose.         She has no concerns regarding her diabetes at this time.   She is not experiencing numbness or burning in feet, excessive thirst, blurry vision, weight changes or redness, sores or blisters on feet.           Hypertension: She presents for follow up of hypertension.  She does not check blood pressure  regularly outside of the clinic.  She does not follow a low salt diet.     Reason for visit:  Rx renewal check    She eats 0-1 servings of fruits and vegetables daily.She consumes 0 sweetened beverage(s) daily.She exercises with enough effort to increase her heart rate 9 or less minutes per day.  She exercises with enough effort to increase her heart rate 3 or less days per week. She is missing 1 dose(s) of medications per week.  She is not taking prescribed medications regularly due to remembering to take.     Hyperlipidemia Follow-Up    Are you regularly taking any medication or supplement to lower your cholesterol?   Yes- lipitor  Are you having muscle aches or other side effects that you think could be caused by your cholesterol lowering medication?  No    Restless leg: on gabapentin and requip and is stable.     She is complaining of a couple months of groin itching.  She has some mild discharge also.  She had read that it is possibly related to Januvia.  She has ran out of her medications about 3 days ago.  She has tried over-the-counter antifungal treatments with no relief.  She has not noticed any rashes.  She denies any dysuria or any concerns for STDs.      Review of Systems   Constitutional, HEENT, cardiovascular, pulmonary, gi and gu systems are negative, except as otherwise noted.      Objective    BP (!) 167/93   Pulse 75   Temp 97.5  F (36.4  C) (Oral)   Resp 20   Ht 1.753 m (5' 9\")   Wt (!) 161 kg (355 lb)   LMP  (LMP " Unknown)   SpO2 96%   BMI 52.42 kg/m    Body mass index is 52.42 kg/m .  Physical Exam   GENERAL: healthy, alert and no distress  EYES: Eyes grossly normal to inspection, PERRL and conjunctivae and sclerae normal  HENT: ear canals and TM's normal, nose and mouth without ulcers or lesions  NECK: no adenopathy, no asymmetry, masses, or scars and thyroid normal to palpation  RESP: lungs clear to auscultation - no rales, rhonchi or wheezes  CV: regular rate and rhythm, normal S1 S2, no S3 or S4, no murmur, click or rub, no peripheral edema and peripheral pulses strong  ABDOMEN: soft, nontender, no hepatosplenomegaly, no masses and bowel sounds normal  MS: no gross musculoskeletal defects noted, no edema  SKIN: no suspicious lesions or rashes  NEURO: Normal strength and tone, mentation intact and speech normal  PSYCH: mentation appears normal, affect normal/bright  Diabetic foot exam: normal DP and PT pulses, no trophic changes or ulcerative lesions, normal sensory exam, and normal monofilament exam    Labs pending

## 2023-09-22 DIAGNOSIS — E11.65 TYPE 2 DIABETES MELLITUS WITH HYPERGLYCEMIA, WITHOUT LONG-TERM CURRENT USE OF INSULIN (H): Chronic | ICD-10-CM

## 2023-09-22 RX ORDER — FLURBIPROFEN SODIUM 0.3 MG/ML
SOLUTION/ DROPS OPHTHALMIC
Qty: 100 EACH | Refills: 2 | Status: SHIPPED | OUTPATIENT
Start: 2023-09-22 | End: 2024-02-28

## 2023-09-23 LAB — BACTERIA UR CULT: NORMAL

## 2023-10-17 DIAGNOSIS — I10 ESSENTIAL HYPERTENSION WITH GOAL BLOOD PRESSURE LESS THAN 140/90: ICD-10-CM

## 2023-10-18 RX ORDER — HYDROCHLOROTHIAZIDE 12.5 MG/1
12.5 TABLET ORAL DAILY
Qty: 30 TABLET | Refills: 0 | Status: SHIPPED | OUTPATIENT
Start: 2023-10-18 | End: 2023-10-19

## 2023-10-19 ENCOUNTER — OFFICE VISIT (OUTPATIENT)
Dept: FAMILY MEDICINE | Facility: CLINIC | Age: 53
End: 2023-10-19
Payer: COMMERCIAL

## 2023-10-19 VITALS
OXYGEN SATURATION: 96 % | HEART RATE: 87 BPM | DIASTOLIC BLOOD PRESSURE: 92 MMHG | RESPIRATION RATE: 20 BRPM | TEMPERATURE: 98.8 F | WEIGHT: 293 LBS | SYSTOLIC BLOOD PRESSURE: 152 MMHG | BODY MASS INDEX: 43.4 KG/M2 | HEIGHT: 69 IN

## 2023-10-19 DIAGNOSIS — G47.33 OSA (OBSTRUCTIVE SLEEP APNEA): Chronic | ICD-10-CM

## 2023-10-19 DIAGNOSIS — L30.9 ECZEMA, UNSPECIFIED TYPE: ICD-10-CM

## 2023-10-19 DIAGNOSIS — I10 ESSENTIAL HYPERTENSION WITH GOAL BLOOD PRESSURE LESS THAN 140/90: ICD-10-CM

## 2023-10-19 DIAGNOSIS — E11.65 TYPE 2 DIABETES MELLITUS WITH HYPERGLYCEMIA, WITHOUT LONG-TERM CURRENT USE OF INSULIN (H): Primary | ICD-10-CM

## 2023-10-19 PROCEDURE — 99214 OFFICE O/P EST MOD 30 MIN: CPT | Mod: 25 | Performed by: PHYSICIAN ASSISTANT

## 2023-10-19 PROCEDURE — 90480 ADMN SARSCOV2 VAC 1/ONLY CMP: CPT | Performed by: PHYSICIAN ASSISTANT

## 2023-10-19 PROCEDURE — 91320 SARSCV2 VAC 30MCG TRS-SUC IM: CPT | Performed by: PHYSICIAN ASSISTANT

## 2023-10-19 PROCEDURE — 90682 RIV4 VACC RECOMBINANT DNA IM: CPT | Performed by: PHYSICIAN ASSISTANT

## 2023-10-19 PROCEDURE — 90472 IMMUNIZATION ADMIN EACH ADD: CPT | Performed by: PHYSICIAN ASSISTANT

## 2023-10-19 PROCEDURE — 90471 IMMUNIZATION ADMIN: CPT | Performed by: PHYSICIAN ASSISTANT

## 2023-10-19 PROCEDURE — 90677 PCV20 VACCINE IM: CPT | Performed by: PHYSICIAN ASSISTANT

## 2023-10-19 RX ORDER — CALCIUM CARBONATE 750 MG/1
750 TABLET, CHEWABLE ORAL DAILY
COMMUNITY

## 2023-10-19 RX ORDER — HYDROCHLOROTHIAZIDE 25 MG/1
25 TABLET ORAL DAILY
Qty: 30 TABLET | Refills: 0 | Status: SHIPPED | OUTPATIENT
Start: 2023-10-19 | End: 2023-11-16

## 2023-10-19 RX ORDER — TRIAMCINOLONE ACETONIDE 1 MG/G
CREAM TOPICAL 2 TIMES DAILY
Qty: 30 G | Refills: 1 | Status: SHIPPED | OUTPATIENT
Start: 2023-10-19

## 2023-10-19 ASSESSMENT — PAIN SCALES - GENERAL: PAINLEVEL: NO PAIN (1)

## 2023-10-19 NOTE — PROGRESS NOTES
Assessment & Plan       ICD-10-CM    1. Type 2 diabetes mellitus with hyperglycemia, without long-term current use of insulin (H)  E11.65 tirzepatide (MOUNJARO) 2.5 MG/0.5ML pen     tirzepatide (MOUNJARO) 5 MG/0.5ML pen      2. Essential hypertension with goal blood pressure less than 140/90  I10 hydrochlorothiazide (HYDRODIURIL) 25 MG tablet      3. CHELO (obstructive sleep apnea)- Moderate (AHI 26)  G47.33 Adult Sleep Eval & Management  Referral      4. Eczema, unspecified type  L30.9 triamcinolone (KENALOG) 0.1 % external cream        We will add Mounjaro if is covered by insurance.  We will stop Victoza.  Warning signs side effects were discussed.  Recheck 3 months.  Follow-up with diabetic education.  We will increase her hydrochlorothiazide to 25 mg a day.  Warning signs side effects were discussed recheck in 2 to 4 weeks.  Referral was placed  Start triamcinolone cream.  Warning signs, side effects discussed.  We discussed that lotion is her main treatment.  Minimize handwashing possible.      KALI Yi [facility]   Antonietta is a 53 year old, presenting for the following health issues:  Hypertension        10/19/2023     3:06 PM   Additional Questions   Roomed by Flaca   Accompanied by n/a       History of Present Illness       Hypertension: She presents for follow up of hypertension.  She does not check blood pressure  regularly outside of the clinic. Outpatient blood pressures have not been over 140/90. She does not follow a low salt diet.     She eats 0-1 servings of fruits and vegetables daily.She consumes 2 sweetened beverage(s) daily.She exercises with enough effort to increase her heart rate 9 or less minutes per day.  She exercises with enough effort to increase her heart rate 3 or less days per week. She is missing 1 dose(s) of medications per week.  She is not taking prescribed medications regularly due to remembering to take.  Patient is  "here for recheck blood pressure after starting the 12.5 mg of hydrochlorothiazide.  She is tolerating well without side effects.  She denies any chest pains or shortness of breath or headaches or dizziness.  She does notice that she gets episodes of dry skin on her hands she is been using lotion she is worried there is anything she can do for this.  She also has a history of sleep apnea but has a very old machine has not been using it for months her last appointment with sleep clinic was 2021.  She is looking for referral for follow-up.  She also has history of diabetes that is uncontrolled.  She stopped her Januvia and noticed some improvements in itching of her groin region but is still there.  She is due for a female exam and will follow-up with a female provider for this.       Review of Systems   Constitutional, HEENT, cardiovascular, pulmonary, gi and gu systems are negative, except as otherwise noted.      Objective    BP (!) 152/92   Pulse 87   Temp 98.8  F (37.1  C) (Tympanic)   Resp 20   Ht 1.753 m (5' 9\")   Wt (!) 164.2 kg (362 lb)   LMP  (LMP Unknown)   SpO2 96%   BMI 53.46 kg/m    Body mass index is 53.46 kg/m .  Physical Exam   GENERAL: healthy, alert and no distress  RESP: lungs clear to auscultation - no rales, rhonchi or wheezes  CV: regular rate and rhythm, normal S1 S2, no S3 or S4, no murmur, click or rub, no peripheral edema and peripheral pulses strong  MS: no gross musculoskeletal defects noted, no edema  Skin: Few dry erythematous patch she scaly skin on her fingers diffusely on her hands.    Labs reviewed                  "

## 2023-10-31 ENCOUNTER — TELEPHONE (OUTPATIENT)
Dept: GASTROENTEROLOGY | Facility: CLINIC | Age: 53
End: 2023-10-31
Payer: COMMERCIAL

## 2023-10-31 NOTE — TELEPHONE ENCOUNTER
"Endoscopy Scheduling Screen    Have you had a positive Covid test in the last 14 days?  No    Are you active on MyChart?   Yes    What insurance is in the chart?  Other:  [employer]    Ordering/Referring Provider: LANIE MCGEE    (If ordering provider performs procedure, schedule with ordering provider unless otherwise instructed. )    BMI: Estimated body mass index is 53.46 kg/m  as calculated from the following:    Height as of 10/19/23: 1.753 m (5' 9\").    Weight as of 10/19/23: 164.2 kg (362 lb).     Sedation Ordered  moderate sedation.   If patient BMI > 50 do not schedule in ASC.    If patient BMI > 45 do not schedule at [facility].    Are you taking methadone or Suboxone?  No    Are you taking any prescription medications for pain 3 or more times per week?   No    Do you have a history of malignant hyperthermia or adverse reaction to anesthesia?  No    (Females) Are you currently pregnant?   No     Have you been diagnosed or told you have pulmonary hypertension?   No    Do you have an LVAD?  No    Have you been told you have moderate to severe sleep apnea?  Yes (RN Review required for scheduling unless scheduling in Hospital.)    Have you been told you have COPD, asthma, or any other lung disease?  No    Do you have any heart conditions?  No     Have you ever had an organ transplant?   No    Have you ever had or are you awaiting a heart or lung transplant?   No    Have you had a stroke or transient ischemic attack (TIA aka \"mini stroke\" in the last 6 months?   No    Have you been diagnosed with or been told you have cirrhosis of the liver?   No    Are you currently on dialysis?   No    Do you need assistance transferring?   No    BMI: Estimated body mass index is 53.46 kg/m  as calculated from the following:    Height as of 10/19/23: 1.753 m (5' 9\").    Weight as of 10/19/23: 164.2 kg (362 lb).     Is patients BMI > 40 and scheduling location [facility]?  Yes (If MAC sedation is ordered, schedule PAC eval)    Do you " take an injectable medication for weight loss or diabetes (excluding insulin)?  Yes, hold time can be up to 7 days. Please check with you prescribing provider for recommendation.     Do you take the medication Naltrexone?  No    Do you take blood thinners?  No       Prep   Are you currently on dialysis or do you have chronic kidney disease?  No    Do you have a diagnosis of diabetes?  Yes (Golytely Prep)    Do you have a diagnosis of cystic fibrosis (CF)?  No    On a regular basis do you go 3 -5 days between bowel movements?  No    BMI > 40?  No    Preferred Pharmacy:    [id number] [facility] [place] [address] [place] [address]  [address] [place] [address]  [facility] [address]  Phone: [phone] Fax: [phone]          Final Scheduling Details   Colonoscopy prep sent?  Standard Golytely    Procedure scheduled  Colonoscopy    Surgeon:  mindy     Date of procedure:  1/19/24     Pre-OP / PAC:   No - Not required for this site.    Location  [facility] - Per exclusion criteria.    Sedation   Moderate Sedation - Per order.      Patient Reminders:   You will receive a call from a Nurse to review instructions and health history.  This assessment must be completed prior to your procedure.  Failure to complete the Nurse assessment may result in the procedure being cancelled.      On the day of your procedure, please designate an adult(s) who can drive you home stay with you for the next 24 hours. The medicines used in the exam will make you sleepy. You will not be able to drive.      You cannot take public transportation, ride share services, or non-medical taxi service without a responsible caregiver.  Medical transport services are allowed with the requirement that a responsible caregiver will receive you at your destination.  We require that drivers and caregivers are confirmed prior to your procedure.

## 2023-11-02 ENCOUNTER — TELEPHONE (OUTPATIENT)
Dept: FAMILY MEDICINE | Facility: CLINIC | Age: 53
End: 2023-11-02
Payer: COMMERCIAL

## 2023-11-02 NOTE — TELEPHONE ENCOUNTER
Patient Quality Outreach    Patient is due for the following:   Hypertension -  BP check  Colon Cancer Screening  Cervical Cancer Screening - PAP Needed  Physical Preventive Adult Physical    Next Steps:   Patient has upcoming appointment, these items will be addressed at that time.    Type of outreach:    Sent Clarizen message.    Next Steps:  Reach out within 90 days via Clarizen.    Max number of attempts reached: No. Will try again in 90 days if patient still on fail list.    Questions for provider review:    None           Jacqueline Hanks MA

## 2023-11-16 ENCOUNTER — OFFICE VISIT (OUTPATIENT)
Dept: FAMILY MEDICINE | Facility: CLINIC | Age: 53
End: 2023-11-16
Payer: COMMERCIAL

## 2023-11-16 VITALS
OXYGEN SATURATION: 96 % | HEIGHT: 69 IN | DIASTOLIC BLOOD PRESSURE: 84 MMHG | BODY MASS INDEX: 43.4 KG/M2 | HEART RATE: 88 BPM | RESPIRATION RATE: 16 BRPM | WEIGHT: 293 LBS | SYSTOLIC BLOOD PRESSURE: 132 MMHG | TEMPERATURE: 97 F

## 2023-11-16 DIAGNOSIS — N89.8 VAGINAL ITCHING: ICD-10-CM

## 2023-11-16 DIAGNOSIS — I10 ESSENTIAL HYPERTENSION WITH GOAL BLOOD PRESSURE LESS THAN 140/90: Primary | ICD-10-CM

## 2023-11-16 DIAGNOSIS — E11.65 TYPE 2 DIABETES MELLITUS WITH HYPERGLYCEMIA, WITHOUT LONG-TERM CURRENT USE OF INSULIN (H): ICD-10-CM

## 2023-11-16 PROCEDURE — 99213 OFFICE O/P EST LOW 20 MIN: CPT | Performed by: PHYSICIAN ASSISTANT

## 2023-11-16 RX ORDER — HYDROCHLOROTHIAZIDE 25 MG/1
25 TABLET ORAL DAILY
Qty: 90 TABLET | Refills: 1 | Status: SHIPPED | OUTPATIENT
Start: 2023-11-16 | End: 2024-01-11

## 2023-11-16 ASSESSMENT — PAIN SCALES - GENERAL: PAINLEVEL: NO PAIN (0)

## 2023-11-16 NOTE — PROGRESS NOTES
Assessment & Plan       ICD-10-CM    1. Essential hypertension with goal blood pressure less than 140/90  I10 Home Blood Pressure Monitor Order     hydrochlorothiazide (HYDRODIURIL) 25 MG tablet      2. Type 2 diabetes mellitus with hyperglycemia, without long-term current use of insulin (H)  E11.65 blood glucose (NO BRAND SPECIFIED) test strip     AMB Adult Diabetes Educator Referral      3. Vaginal itching  N89.8       medical conditions are stable. meds refilled.  medical conditions are stable. meds refilled. Will have her follow up  with DM ed. Recheck 2 months.   Needs exam. Would like to follow up  with Ob/gyn.       History of Present Illness       Diabetes:   She presents for follow up of diabetes.    She is not checking blood glucose.        She is concerned about other.    She is not experiencing numbness or burning in feet, excessive thirst, blurry vision, weight changes or redness, sores or blisters on feet. The patient has had a diabetic eye exam in the last 12 months. Eye exam performed on 12/28/22. Location of last eye exam [facility].        She eats 0-1 servings of fruits and vegetables daily.She consumes 0 sweetened beverage(s) daily.She exercises with enough effort to increase her heart rate 9 or less minutes per day.  She exercises with enough effort to increase her heart rate 3 or less days per week. She is missing 1 dose(s) of medications per week.  She is not taking prescribed medications regularly due to remembering to take.   Just start mounjaro about 4 wks ago. Tolerating it well.     Hypertension Follow-up    Do you check your blood pressure regularly outside of the clinic? No   Are you following a low salt diet? Yes  Are your blood pressures ever more than 140 on the top number (systolic) OR more   than 90 on the bottom number (diastolic), for example 140/90? No     Con't to have vaginal itching off and on.       Review of Systems   Constitutional, HEENT, cardiovascular, pulmonary,  "gi and gu systems are negative, except as otherwise noted.      Objective    /84   Pulse 88   Temp 97  F (36.1  C) (Tympanic)   Resp 16   Ht 1.753 m (5' 9\")   Wt (!) 164.7 kg (363 lb)   LMP  (LMP Unknown)   SpO2 96%   BMI 53.61 kg/m    Body mass index is 53.61 kg/m .  Physical Exam   GENERAL: healthy, alert and no distress  RESP: lungs clear to auscultation - no rales, rhonchi or wheezes  CV: regular rate and rhythm, normal S1 S2, no S3 or S4, no murmur, click or rub, no peripheral edema and peripheral pulses strong  MS: no gross musculoskeletal defects noted, no edema  PSYCH: mentation appears normal, affect normal/bright              DME (Durable Medical Equipment) Orders and Documentation  Orders Placed This Encounter   Procedures    Home Blood Pressure Monitor Order        The patient was assessed and it was determined the patient is in need of the following listed DME Supplies/Equipment. Please complete supporting documentation below to demonstrate medical necessity.              "

## 2023-11-17 ENCOUNTER — TELEPHONE (OUTPATIENT)
Dept: FAMILY MEDICINE | Facility: CLINIC | Age: 53
End: 2023-11-17
Payer: COMMERCIAL

## 2023-11-17 DIAGNOSIS — E11.65 TYPE 2 DIABETES MELLITUS WITH HYPERGLYCEMIA, WITHOUT LONG-TERM CURRENT USE OF INSULIN (H): Primary | ICD-10-CM

## 2023-11-18 NOTE — TELEPHONE ENCOUNTER
Fax message regarding   blood glucose (NO BRAND SPECIFIED) test strip   Rcvd:  How often is she testing daily?

## 2024-01-06 ENCOUNTER — TELEPHONE (OUTPATIENT)
Dept: GASTROENTEROLOGY | Facility: CLINIC | Age: 54
End: 2024-01-06
Payer: COMMERCIAL

## 2024-01-06 DIAGNOSIS — Z12.11 SPECIAL SCREENING FOR MALIGNANT NEOPLASMS, COLON: Primary | ICD-10-CM

## 2024-01-06 RX ORDER — BISACODYL 5 MG/1
TABLET, DELAYED RELEASE ORAL
Qty: 4 TABLET | Refills: 0 | Status: SHIPPED | OUTPATIENT
Start: 2024-01-06 | End: 2024-06-21

## 2024-01-06 NOTE — TELEPHONE ENCOUNTER
Pre visit planning completed.      Procedure details:    Patient scheduled for Colonoscopy  on 1.19.24.     Arrival time: 0715. Procedure time 0815    Pre op exam needed? N/A    Facility location: [facility]; [address], [place] [address]    Sedation type: Conscious sedation     Indication for procedure: screening      Chart review:     Electronic implanted devices? No    Recent diagnosis of diverticulitis within the last 6 weeks? No    Diabetic? Yes. See medication holding recommendations     Diabetic medication HOLDING recommendations: (if applicable)  Oral diabetic medications: Yes:  Metformin (glucophage): HOLD day of procedure.  Diabetic injectables: Yes- Mounjaro (Tirzepatide).  Weekly dosing of medication.  Hold 7 days before procedure.  Follow up with managing provider.   Insulin: No      Medication review:    Anticoagulants? No    NSAIDS? No NSAID medications per patient's medication list.  RN will verify with pre-assessment call.    Other medication HOLDING recommendations:  Iron supplements: HOLD 7 days before procedure.      Prep for procedure:     Bowel prep recommendation: Extended prep Golytely    Due to:  BMI > 40.  and diabetes.     Prep instructions sent via Jini Bowel prep script sent to    [facility] [id number] [facility] - [place] - [address]      Lynette Lewis RN  Endoscopy Procedure Pre Assessment RN  [phone] option 4

## 2024-01-08 NOTE — TELEPHONE ENCOUNTER
Attempted to contact patient in order to complete pre assessment questions.     No answer. Left message to return call to [phone] option 4    Missed call communication sent via IM5.    Loren Paul RN  Endoscopy Procedure Pre Assessment RN  [phone] option 4

## 2024-01-11 ENCOUNTER — OFFICE VISIT (OUTPATIENT)
Dept: FAMILY MEDICINE | Facility: CLINIC | Age: 54
End: 2024-01-11
Payer: COMMERCIAL

## 2024-01-11 VITALS
HEIGHT: 69 IN | RESPIRATION RATE: 20 BRPM | WEIGHT: 293 LBS | TEMPERATURE: 97.6 F | OXYGEN SATURATION: 97 % | HEART RATE: 110 BPM | DIASTOLIC BLOOD PRESSURE: 84 MMHG | BODY MASS INDEX: 43.4 KG/M2 | SYSTOLIC BLOOD PRESSURE: 138 MMHG

## 2024-01-11 DIAGNOSIS — I10 ESSENTIAL HYPERTENSION WITH GOAL BLOOD PRESSURE LESS THAN 140/90: ICD-10-CM

## 2024-01-11 DIAGNOSIS — E11.65 TYPE 2 DIABETES MELLITUS WITH HYPERGLYCEMIA, WITHOUT LONG-TERM CURRENT USE OF INSULIN (H): ICD-10-CM

## 2024-01-11 DIAGNOSIS — E78.5 HYPERLIPIDEMIA LDL GOAL <70: Primary | ICD-10-CM

## 2024-01-11 DIAGNOSIS — E66.01 MORBID OBESITY WITH BMI OF 50.0-59.9, ADULT (H): ICD-10-CM

## 2024-01-11 LAB
ALBUMIN SERPL BCG-MCNC: 4.1 G/DL (ref 3.5–5.2)
ALP SERPL-CCNC: 78 U/L (ref 40–150)
ALT SERPL W P-5'-P-CCNC: 117 U/L (ref 0–50)
ANION GAP SERPL CALCULATED.3IONS-SCNC: 10 MMOL/L (ref 7–15)
AST SERPL W P-5'-P-CCNC: 60 U/L (ref 0–45)
BILIRUB SERPL-MCNC: 0.5 MG/DL
BUN SERPL-MCNC: 14.3 MG/DL (ref 6–20)
CALCIUM SERPL-MCNC: 10.3 MG/DL (ref 8.6–10)
CHLORIDE SERPL-SCNC: 99 MMOL/L (ref 98–107)
CHOLEST SERPL-MCNC: 124 MG/DL
CREAT SERPL-MCNC: 0.75 MG/DL (ref 0.51–0.95)
DEPRECATED HCO3 PLAS-SCNC: 28 MMOL/L (ref 22–29)
EGFRCR SERPLBLD CKD-EPI 2021: >90 ML/MIN/1.73M2
FASTING STATUS PATIENT QL REPORTED: YES
GLUCOSE SERPL-MCNC: 280 MG/DL (ref 70–99)
HBA1C MFR BLD: 9.7 % (ref 0–5.6)
HDLC SERPL-MCNC: 37 MG/DL
LDLC SERPL CALC-MCNC: 54 MG/DL
NONHDLC SERPL-MCNC: 87 MG/DL
POTASSIUM SERPL-SCNC: 4.6 MMOL/L (ref 3.4–5.3)
PROT SERPL-MCNC: 7.4 G/DL (ref 6.4–8.3)
SODIUM SERPL-SCNC: 137 MMOL/L (ref 135–145)
TRIGL SERPL-MCNC: 165 MG/DL

## 2024-01-11 PROCEDURE — 83036 HEMOGLOBIN GLYCOSYLATED A1C: CPT | Performed by: PHYSICIAN ASSISTANT

## 2024-01-11 PROCEDURE — 80061 LIPID PANEL: CPT | Performed by: PHYSICIAN ASSISTANT

## 2024-01-11 PROCEDURE — 36415 COLL VENOUS BLD VENIPUNCTURE: CPT | Performed by: PHYSICIAN ASSISTANT

## 2024-01-11 PROCEDURE — 80053 COMPREHEN METABOLIC PANEL: CPT | Performed by: PHYSICIAN ASSISTANT

## 2024-01-11 PROCEDURE — 99214 OFFICE O/P EST MOD 30 MIN: CPT | Performed by: PHYSICIAN ASSISTANT

## 2024-01-11 RX ORDER — ATORVASTATIN CALCIUM 10 MG/1
10 TABLET, FILM COATED ORAL DAILY
Qty: 90 TABLET | Refills: 1 | Status: SHIPPED | OUTPATIENT
Start: 2024-01-11

## 2024-01-11 RX ORDER — LOSARTAN POTASSIUM 100 MG/1
100 TABLET ORAL DAILY
Qty: 90 TABLET | Refills: 1 | Status: SHIPPED | OUTPATIENT
Start: 2024-01-11

## 2024-01-11 RX ORDER — METFORMIN HCL 500 MG
2000 TABLET, EXTENDED RELEASE 24 HR ORAL
Qty: 360 TABLET | Refills: 3 | Status: SHIPPED | OUTPATIENT
Start: 2024-01-11

## 2024-01-11 RX ORDER — HYDROCHLOROTHIAZIDE 25 MG/1
25 TABLET ORAL DAILY
Qty: 90 TABLET | Refills: 1 | Status: SHIPPED | OUTPATIENT
Start: 2024-01-11 | End: 2024-07-30

## 2024-01-11 ASSESSMENT — PAIN SCALES - GENERAL: PAINLEVEL: NO PAIN (0)

## 2024-01-11 NOTE — PROGRESS NOTES
Assessment & Plan       ICD-10-CM    1. Hyperlipidemia LDL goal <70  E78.5       2. Type 2 diabetes mellitus with hyperglycemia, without long-term current use of insulin (H)  E11.65 Lipid panel reflex to direct LDL Fasting     Comprehensive metabolic panel (BMP + Alb, Alk Phos, ALT, AST, Total. Bili, TP)     Hemoglobin A1c     atorvastatin (LIPITOR) 10 MG tablet     losartan (COZAAR) 100 MG tablet     metFORMIN (GLUCOPHAGE XR) 500 MG 24 hr tablet     tirzepatide (MOUNJARO) 7.5 MG/0.5ML pen      3. Essential hypertension with goal blood pressure less than 140/90  I10 hydrochlorothiazide (HYDRODIURIL) 25 MG tablet     losartan (COZAAR) 100 MG tablet      4. Morbid obesity with BMI of 50.0-59.9, adult (H)  E66.01 Adult Comprehensive Weight Management  Referral    Z68.43       Work on Healthy diet and exercise. Getting heart rate elevated for 30 mins most days of week.  medical conditions are stable. meds refilled.  Will increase monjauro to 7.5mg daily. Referral to weight manangent placed.   Recheck 3 months.       KALI Yi [facility]   Antonietta is a 53 year old, presenting for the following health issues:  Diabetes        1/11/2024     8:58 AM   Additional Questions   Roomed by deandre   Accompanied by self         1/11/2024     8:58 AM   Patient Reported Additional Medications   Patient reports taking the following new medications no       History of Present Illness       Diabetes:   She presents for follow up of diabetes.    She is not checking blood glucose.         She has no concerns regarding her diabetes at this time.   She is not experiencing numbness or burning in feet, excessive thirst, blurry vision, weight changes or redness, sores or blisters on feet. The patient has not had a diabetic eye exam in the last 12 months.          She eats 2-3 servings of fruits and vegetables daily.She consumes 1 sweetened beverage(s) daily.She exercises with enough  "effort to increase her heart rate 9 or less minutes per day.  She exercises with enough effort to increase her heart rate 3 or less days per week. She is missing 1 dose(s) of medications per week.  She is not taking prescribed medications regularly due to remembering to take.         Diabetes Follow-up    How often are you checking your blood sugar? Not at all  What concerns do you have today about your diabetes? None   Do you have any of these symptoms? (Select all that apply)  No numbness or tingling in feet.  No redness, sores or blisters on feet.  No complaints of excessive thirst.  No reports of blurry vision.  No significant changes to weight.  Have you had a diabetic eye exam in the last 12 months? Yes            Hyperlipidemia Follow-Up    Are you regularly taking any medication or supplement to lower your cholesterol?   Yes- lipitor  Are you having muscle aches or other side effects that you think could be caused by your cholesterol lowering medication?  No    Hypertension Follow-up    Do you check your blood pressure regularly outside of the clinic? Yes   Are you following a low salt diet? Yes  Are your blood pressures ever more than 140 on the top number (systolic) OR more   than 90 on the bottom number (diastolic), for example 140/90? No    BP Readings from Last 2 Encounters:   01/11/24 138/84   11/16/23 132/84     Hemoglobin A1C (%)   Date Value   09/21/2023 8.5 (H)   12/15/2022 7.0 (H)   02/04/2021 9.2 (H)   01/16/2020 6.0 (H)     LDL Cholesterol Calculated (mg/dL)   Date Value   09/21/2023 52   12/15/2022 43   02/04/2021 64   01/16/2020 76         Review of Systems   Constitutional, HEENT, cardiovascular, pulmonary, gi and gu systems are negative, except as otherwise noted.      Objective    /84   Pulse 110   Temp 97.6  F (36.4  C) (Tympanic)   Resp 20   Ht 1.753 m (5' 9\")   Wt (!) 165.1 kg (364 lb)   LMP  (LMP Unknown)   SpO2 97%   BMI 53.75 kg/m    Body mass index is 53.75 " kg/m .  Physical Exam   GENERAL: healthy, alert and no distress  EYES: Eyes grossly normal to inspection, PERRL and conjunctivae and sclerae normal  HENT: ear canals and TM's normal, nose and mouth without ulcers or lesions  NECK: no adenopathy, no asymmetry, masses, or scars and thyroid normal to palpation  RESP: lungs clear to auscultation - no rales, rhonchi or wheezes  CV: regular rate and rhythm, normal S1 S2, no S3 or S4, no murmur, click or rub, no peripheral edema and peripheral pulses strong  MS: no gross musculoskeletal defects noted, no edema  NEURO: Normal strength and tone, mentation intact and speech normal  PSYCH: mentation appears normal, affect normal/bright    Labs pending

## 2024-01-15 NOTE — TELEPHONE ENCOUNTER
Second call attempt to complete pre assessment.     No answer.  Left message to return call to [phone] #4 by next business day prior to 4PM or procedure will be sent to cancel. Alintot message sent      Joyce Chavarria RN  Endoscopy Procedure Pre Assessment RN

## 2024-01-15 NOTE — TELEPHONE ENCOUNTER
Pre assessment completed for upcoming procedure.   (Please see previous telephone encounter notes for complete details)    Patient  returned call.       Procedure details:    Arrival time and facility location reviewed.    Pre op exam needed? N/A    Designated  policy reviewed. Instructed to have someone stay 6 hours post procedure.     COVID policy reviewed.      Medication review:    Oral diabetic medication(s): Metformin (glucophage): HOLD day of procedure.  Injectable diabetic medication(s): Mounjaro (Tirzepatide).  Weekly dosing of medication.  Hold 7 days before procedure.  Follow up with managing provider.   Ferrous Sulfate (iron supplement): HOLD 7 days before procedure.      Prep for procedure:     Procedure prep instructions reviewed.        Additional information needed?  N/A      Patient  verbalized understanding and had no questions or concerns at this time.      Loren Lyle RN  Endoscopy Procedure Pre Assessment RN  [phone] option 4

## 2024-01-16 ENCOUNTER — TELEPHONE (OUTPATIENT)
Dept: GASTROENTEROLOGY | Facility: CLINIC | Age: 54
End: 2024-01-16
Payer: COMMERCIAL

## 2024-01-16 DIAGNOSIS — E11.65 TYPE 2 DIABETES MELLITUS WITH HYPERGLYCEMIA, WITHOUT LONG-TERM CURRENT USE OF INSULIN (H): ICD-10-CM

## 2024-01-16 DIAGNOSIS — Z12.12 SCREENING FOR MALIGNANT NEOPLASM OF THE RECTUM: Primary | ICD-10-CM

## 2024-01-16 RX ORDER — ASPIRIN 81 MG/1
81 TABLET, COATED ORAL DAILY
Qty: 90 TABLET | Refills: 0 | Status: SHIPPED | OUTPATIENT
Start: 2024-01-16 | End: 2024-04-29

## 2024-01-16 NOTE — TELEPHONE ENCOUNTER
Caller: Antonietta  Reason for Reschedule/Cancellation (please be detailed, any staff messages or encounters to note?): Patient unavailable       Prior to reschedule please review:  Ordering Provider:     Jose Malik PA-C in [facility]     Sedation per order: moderate  Does patient have any ASC Exclusions, please identify?: y CHELO      Notes on Cancelled Procedure:  Procedure: Lower Endoscopy [Colonoscopy]   Date: 01/19/2024  Location: [facility]; [address] [place] [address]  Surgeon: Cassy       Rescheduled: Yes  Procedure: Lower Endoscopy [Colonoscopy]   Date: 04/12/2024  Location: [facility]; [address] [place] [address]  Surgeon: Cassy   Sedation Level Scheduled  moderate,  Reason for Sedation Level per order  Prep/Instructions updated and sent: y       Send In - basket message to Panc - Ken Pool if E[facility]  procedure is canceled or rescheduled: [ N/A, YES or NO] na

## 2024-01-20 ENCOUNTER — TRANSFERRED RECORDS (OUTPATIENT)
Dept: MULTI SPECIALTY CLINIC | Facility: CLINIC | Age: 54
End: 2024-01-20

## 2024-01-20 LAB — RETINOPATHY: NORMAL

## 2024-02-01 DIAGNOSIS — G25.81 RESTLESS LEGS SYNDROME (RLS): ICD-10-CM

## 2024-02-01 RX ORDER — ROPINIROLE 5 MG/1
TABLET, FILM COATED ORAL
Qty: 90 TABLET | Refills: 1 | Status: SHIPPED | OUTPATIENT
Start: 2024-02-01 | End: 2024-07-29

## 2024-02-07 DIAGNOSIS — E11.65 TYPE 2 DIABETES MELLITUS WITH HYPERGLYCEMIA, WITHOUT LONG-TERM CURRENT USE OF INSULIN (H): ICD-10-CM

## 2024-02-07 RX ORDER — TIRZEPATIDE 5 MG/.5ML
INJECTION, SOLUTION SUBCUTANEOUS
Qty: 6 ML | Refills: 0 | Status: SHIPPED | OUTPATIENT
Start: 2024-02-07 | End: 2024-02-28 | Stop reason: DRUGHIGH

## 2024-02-23 ASSESSMENT — SLEEP AND FATIGUE QUESTIONNAIRES
HOW LIKELY ARE YOU TO NOD OFF OR FALL ASLEEP WHILE SITTING INACTIVE IN A PUBLIC PLACE: SLIGHT CHANCE OF DOZING
HOW LIKELY ARE YOU TO NOD OFF OR FALL ASLEEP WHILE SITTING AND TALKING TO SOMEONE: SLIGHT CHANCE OF DOZING
HOW LIKELY ARE YOU TO NOD OFF OR FALL ASLEEP WHILE WATCHING TV: MODERATE CHANCE OF DOZING
HOW LIKELY ARE YOU TO NOD OFF OR FALL ASLEEP WHILE SITTING AND READING: SLIGHT CHANCE OF DOZING
HOW LIKELY ARE YOU TO NOD OFF OR FALL ASLEEP WHEN YOU ARE A PASSENGER IN A CAR FOR AN HOUR WITHOUT A BREAK: SLIGHT CHANCE OF DOZING
HOW LIKELY ARE YOU TO NOD OFF OR FALL ASLEEP WHILE SITTING QUIETLY AFTER LUNCH WITHOUT ALCOHOL: SLIGHT CHANCE OF DOZING
HOW LIKELY ARE YOU TO NOD OFF OR FALL ASLEEP WHILE LYING DOWN TO REST IN THE AFTERNOON WHEN CIRCUMSTANCES PERMIT: HIGH CHANCE OF DOZING
HOW LIKELY ARE YOU TO NOD OFF OR FALL ASLEEP IN A CAR, WHILE STOPPED FOR A FEW MINUTES IN TRAFFIC: SLIGHT CHANCE OF DOZING

## 2024-02-28 ENCOUNTER — OFFICE VISIT (OUTPATIENT)
Dept: SLEEP MEDICINE | Facility: CLINIC | Age: 54
End: 2024-02-28
Attending: PHYSICIAN ASSISTANT
Payer: COMMERCIAL

## 2024-02-28 VITALS
WEIGHT: 293 LBS | DIASTOLIC BLOOD PRESSURE: 80 MMHG | BODY MASS INDEX: 43.4 KG/M2 | HEART RATE: 90 BPM | OXYGEN SATURATION: 97 % | HEIGHT: 69 IN | SYSTOLIC BLOOD PRESSURE: 149 MMHG | RESPIRATION RATE: 16 BRPM

## 2024-02-28 DIAGNOSIS — G47.33 OSA (OBSTRUCTIVE SLEEP APNEA): Primary | Chronic | ICD-10-CM

## 2024-02-28 DIAGNOSIS — E66.01 MORBID OBESITY WITH BMI OF 50.0-59.9, ADULT (H): Chronic | ICD-10-CM

## 2024-02-28 DIAGNOSIS — R79.0 LOW FERRITIN: ICD-10-CM

## 2024-02-28 DIAGNOSIS — G25.81 RESTLESS LEGS SYNDROME (RLS): ICD-10-CM

## 2024-02-28 PROCEDURE — 99214 OFFICE O/P EST MOD 30 MIN: CPT | Performed by: PHYSICIAN ASSISTANT

## 2024-02-28 PROCEDURE — G2211 COMPLEX E/M VISIT ADD ON: HCPCS | Performed by: PHYSICIAN ASSISTANT

## 2024-02-28 NOTE — NURSING NOTE
DME orders have been automatically faxed to [facility]. 3 month new CPAP/follow-up appointment with provider has been scheduled. AVS printed and given to patient. BP rechecked and charted.  Kaykay Street, CMA

## 2024-02-28 NOTE — NURSING NOTE
"Chief Complaint   Patient presents with    Sleep Problem     Has been feeling exhausted and waking up several times a night. She has a CPAP machine, but it is so old she can't find any supplies. Would like to restart CPAP.       Initial BP (!) 142/84   Pulse 90   Resp 16   Ht 1.753 m (5' 9\")   Wt (!) 165.3 kg (364 lb 6.4 oz)   LMP  (LMP Unknown)   SpO2 97%   BMI 53.81 kg/m   Estimated body mass index is 53.81 kg/m  as calculated from the following:    Height as of this encounter: 1.753 m (5' 9\").    Weight as of this encounter: 165.3 kg (364 lb 6.4 oz).    Medication Reconciliation: complete  ESS: 11  Neck circumference: 17.75 inches / 45 centimeters.  DME: Online  Kaykay Street CMA      "

## 2024-02-28 NOTE — PROGRESS NOTES
Sleep Apnea - Follow-up Visit:    Impression/Plan:  Moderate obstructive sleep apnea, currently not treated-  She presents with snoring, apnea, excessive daytime sleepiness (ESS 11)  She needs a new machine as her current machine is about 12 years old and not working.   Comprehensive DME order for a replacement auto-CPAP 8-14 cm/H20.     Restless leg syndrome-  Managed by PCP  Gabapentin 600 mg at bedtime.   Requip 5 mg at bedtime   Iron supplementation daily   She reports good control. Discussed possible augmentation.     Antonietta Christie will follow up in about 3 month(s).     30 minutes spent on day of encounter doing chart review,  history and exam, counseling, coordinating plan of care, documentation and further activities as noted above.       Rodolfo Chahal PA-C  Sleep Medicine     History of Present Illness:  Chief Complaint   Patient presents with    Sleep Problem     Has been feeling exhausted and waking up several times a night. She has a CPAP machine, but it is so old she can't find any supplies. Would like to restart CPAP.       Antonietta Christie presents for follow-up of their moderate sleep apnea, managed with CPAP.     Patient initially presented to [facility] in 2013 as part of bariatric surgery evaluation with snoring, obesity, sleepiness (ESS 5), morning headaches, nocturnal GERD, narrowed oropharynx.   - Restless leg syndrome with low iron levels (ferritin 20)   - Night owl physiology, history of sleep maintenance insomnia.     She had a previous negative sleep study at [facility] but had gained weight since then.   Sleep Study 5/27/10 [facility] (313#)- AHI 2, RDI 9, Lo)2 92%, sleep fragmented, PLMI 18     Sleep Study 9/23/13 (367#)- AHI 26.2, RDI 30.8, Lo2 88%, TcCo2 levels serge from 40-41 to 51. Baseline ABG 7.41/38/91. PLMI 0. CPAP 9 cm effective including REM-supine. Max TcCO2 46, PLMI 1.0.     She was started on Auto-cpap 6-10 cmH20. Her compliance was poor.     DME [employer]    She was not seen  again....    Ferritin 14 12/28/2021    Last sleep medicine follow up was with Dr. Rodriguez on 3/9/2021. Tolerating CPAP, but no download available.     Do you use a CPAP Machine at home:  No.She stopped last summer because cannot find supplies for her old machine.     What is your typical bedtime:  1030 pm  How long does it take you to go to sleep:  15-30 minutes  What time do you typically get out of bed for the day:  8 am  How many hours on average per night are you using PAP therapy:  0  How many hours are you sleeping per night:  8  Do you feel well rested in the morning:  no      [employer]   Auto-PAP 8 - 12 cmH2O 30 day usage data:  none available since 2012    Restless leg syndrome:   Managed by PCP  Gabapentin 600 mg at bedtime.   Requip 5 mg at bedtime   Iron supplementation daily   She reports good control.       EPWORTH SLEEPINESS SCALE         2/28/2024     1:00 PM    Ravenden Sleepiness Scale ( DEMETRICE Jasmine  [id number]<br>ESS - [place]/English - Final version - 21 Nov 07 - [facility] [place].)   Ravenden Score (Sleep) 11       INSOMNIA SEVERITY INDEX (ANNA)          2/28/2024     1:00 PM   Insomnia Severity Index (ANNA)   Difficulty falling asleep 0   Difficulty staying asleep 3   Problems waking up too early 2   How SATISFIED/DISSATISFIED are you with your CURRENT sleep pattern? 3   How NOTICEABLE to others do you think your sleep problem is in terms of impairing the quality of your life? 3   How WORRIED/DISTRESSED are you about your current sleep problem? 4   To what extent do you consider your sleep problem to INTERFERE with your daily functioning (e.g. daytime fatigue, mood, ability to function at work/daily chores, concentration, memory, mood, etc.) CURRENTLY? 2   ANNA Total Score 17       Guidelines for Scoring/Interpretation:  Total score categories:  0-7 = No clinically significant insomnia   8-14 = Subthreshold insomnia   15-21 = Clinical insomnia (moderate severity)  22-28 = Clinical insomnia (severe)  Used  "via courtesy of www.[facility].va.gov with permission from Jose Hodges PhD., [facility]        Past medical/surgical history, family history, social history, medications and allergies were reviewed.        Problem List:  Patient Active Problem List    Diagnosis Date Noted    Hyperlipidemia LDL goal <70 03/04/2021     Priority: Medium    Type 2 diabetes mellitus with hyperglycemia, without long-term current use of insulin (H) 12/01/2016     Priority: Medium     Type 2 diabetes, HbA1c goal < 7%      Morbid obesity with BMI of 50.0-59.9, adult (H) 12/01/2016     Priority: Medium    CHELO (obstructive sleep apnea)- Moderate (AHI 26) 09/26/2013     Priority: Medium     Sleep Study 9/23/13 (367#)- AHI 26.2, RDI 30.8, Lo2 88%, TcCo2 levels serge from 40-41 to 51. Baseline ABG 7.41/38/91. PLMI 0. CPAP 9 cm effective including REM-supine. Max TcCO2 46, PLMI 1.0.        Seborrheic keratosis 09/12/2013     Priority: Medium    Essential hypertension with goal blood pressure less than 140/90 08/13/2013     Priority: Medium    Carpal tunnel syndrome 01/11/2013     Priority: Medium    Peripheral edema 08/01/2012     Priority: Medium    Restless legs syndrome (RLS) 10/11/2011     Priority: Medium    Bunion of great toe of left foot 03/17/2011     Priority: Medium    GERD (gastroesophageal reflux disease)      Priority: Medium    Low ferritin 03/04/2021     Priority: Low        BP (!) 142/84   Pulse 90   Resp 16   Ht 1.753 m (5' 9\")   Wt (!) 165.3 kg (364 lb 6.4 oz)   LMP  (LMP Unknown)   SpO2 97%   BMI 53.81 kg/m     "

## 2024-02-28 NOTE — PATIENT INSTRUCTIONS
Your Body mass index is 53.81 kg/m .  Weight management is a personal decision.  If you are interested in exploring weight loss strategies, the following discussion covers the approaches that may be successful. Body mass index (BMI) is one way to tell whether you are at a healthy weight, overweight, or obese. It measures your weight in relation to your height.  A BMI of 18.5 to 24.9 is in the healthy range. A person with a BMI of 25 to 29.9 is considered overweight, and someone with a BMI of 30 or greater is considered obese. More than two-thirds of American adults are considered overweight or obese.  Being overweight or obese increases the risk for further weight gain. Excess weight may lead to heart disease and diabetes.  Creating and following plans for healthy eating and physical activity may help you improve your health.  Weight control is part of healthy lifestyle and includes exercise, emotional health, and healthy eating habits. Careful eating habits lifelong are the mainstay of weight control. Though there are significant health benefits from weight loss, long-term weight loss with diet alone may be very difficult to achieve- studies show long-term success with dietary management in less than 10% of people. Attaining a healthy weight may be especially difficult to achieve in those with severe obesity. In some cases, medications, devices and surgical management might be considered.  What can you do?  If you are overweight or obese and are interested in methods for weight loss, you should discuss this with your provider.   Consider reducing daily calorie intake by 500 calories.   Keep a food journal.   Avoiding skipping meals, consider cutting portions instead.    Diet combined with exercise helps maintain muscle while optimizing fat loss. Strength training is particularly important for building and maintaining muscle mass. Exercise helps reduce stress, increase energy, and improves fitness. Increasing  exercise without diet control, however, may not burn enough calories to loose weight.     Start walking three days a week 10-20 minutes at a time  Work towards walking thirty minutes five days a week   Eventually, increase the speed of your walking for 1-2 minutes at time    In addition, we recommend that you review healthy lifestyles and methods for weight loss available through the National Institutes of Health patient information sites:  http://win.niddk.nih.gov/publications/index.htm    And look into health and wellness programs that may be available through your health insurance provider, employer, local community center, or nathanael club.

## 2024-03-05 DIAGNOSIS — G25.81 RESTLESS LEGS SYNDROME (RLS): ICD-10-CM

## 2024-03-06 RX ORDER — GABAPENTIN 300 MG/1
CAPSULE ORAL
Qty: 180 CAPSULE | Refills: 3 | Status: SHIPPED | OUTPATIENT
Start: 2024-03-06

## 2024-03-12 ENCOUNTER — TELEPHONE (OUTPATIENT)
Dept: FAMILY MEDICINE | Facility: CLINIC | Age: 54
End: 2024-03-12
Payer: COMMERCIAL

## 2024-03-12 NOTE — TELEPHONE ENCOUNTER
Patient Quality Outreach    Patient is due for the following:   Colon Cancer Screening  Cervical Cancer Screening - PAP Needed  Physical Preventive Adult Physical    Next Steps:   Schedule a Adult Preventative    Type of outreach:    Sent Inspherion message.    Next Steps:  Reach out within 90 days via Inspherion.    Max number of attempts reached: No. Will try again in 90 days if patient still on fail list.    Questions for provider review:    None           Jacqueline Hanks MA

## 2024-03-18 ENCOUNTER — TELEPHONE (OUTPATIENT)
Dept: SLEEP MEDICINE | Facility: CLINIC | Age: 54
End: 2024-03-18
Payer: COMMERCIAL

## 2024-03-18 NOTE — TELEPHONE ENCOUNTER
Patient left a message wanting orders to be faxed out.     Writer left detail message, asking where patient wants the orders to be sent to.     Upon call back, please ask where orders needs to be faxed to. [facility]

## 2024-03-18 NOTE — TELEPHONE ENCOUNTER
Patient is returning the call, she would like the order for be faxed to [facility] at [phone].  Antonietta Burns   [facility]  Central Scheduler

## 2024-03-19 NOTE — TELEPHONE ENCOUNTER
Order and documentation faxed to number provided.     Maryse HANLEY RN  [facility]

## 2024-03-27 RX ORDER — BISACODYL 5 MG/1
TABLET, DELAYED RELEASE ORAL
Qty: 4 TABLET | Refills: 0 | Status: SHIPPED | OUTPATIENT
Start: 2024-03-27 | End: 2024-06-21

## 2024-03-28 ENCOUNTER — TELEPHONE (OUTPATIENT)
Dept: GASTROENTEROLOGY | Facility: CLINIC | Age: 54
End: 2024-03-28
Payer: COMMERCIAL

## 2024-03-28 NOTE — TELEPHONE ENCOUNTER
Attempted to contact patient in order to complete pre assessment questions.     No answer. Left message to return call to [phone] option 4    Callback required communication sent via [employer].    Olamide Peng RN  Endoscopy Procedure Pre Assessment RN

## 2024-03-28 NOTE — TELEPHONE ENCOUNTER
Pre visit planning completed.      Procedure details:    Patient scheduled for Colonoscopy  on 4/12/2024.     Arrival time: 1415. Procedure time 1515    Facility location: [facility]; [address], [place] [address]. Check in location: Main entrance at registration desk.    Sedation type: Conscious sedation     Pre op exam needed? N/A    Indication for procedure: Screening for malignant neoplasm of the rectum [Z12.12]       Chart review:     Electronic implanted devices? No    Recent diagnosis of diverticulitis within the last 6 weeks? No    Diabetic? Yes. Diabetic medication HOLDING recommendations: Oral diabetic medications: Yes:  Metformin (glucophage): HOLD day of procedure.   Diabetic injectables: Yes- Mounjaro (Tirzepatide).  Weekly dosing of medication.  Hold 7 days before procedure.  Follow up with managing provider.       Medication review:    Anticoagulants? No    NSAIDS? No NSAID medications per patient's medication list.  RN will verify with pre-assessment call.    Other medication HOLDING recommendations:  Ferrous sulfate (iron supplements): HOLD 7 days before procedure.      Prep for procedure:     Bowel prep recommendation: Extended Golytely. Bowel prep prescription sent to [facility] [address] [place] [facility] [address]   Due to: BMI > 40.     Prep instructions sent via jessica Peng RN  Endoscopy Procedure Pre Assessment RN  [phone] option 4

## 2024-04-02 NOTE — TELEPHONE ENCOUNTER
Second call attempt to complete pre assessment.     No answer.  Left message to return call to [phone] #4 by next business day prior to 4PM or procedure will be sent to cancel.     Callback required communication sent via [employer].      Olamide Peng RN  Endoscopy Procedure Pre Assessment RN

## 2024-04-03 NOTE — TELEPHONE ENCOUNTER
Pre assessment completed for upcoming procedure.   (Please see previous telephone encounter notes for complete details)    Patient  returned call.       Procedure details:    Arrival time and facility location reviewed.    Pre op exam needed? N/A    Designated  policy reviewed. Instructed to have someone stay 6  hours post procedure.       Medication review:    Oral diabetic medication(s): Metformin (glucophage): HOLD day of procedure.  Injectable diabetic medication(s): Mounjaro (Tirzepatide).  Weekly dosing of medication.  Hold 7 days before procedure.  Follow up with managing provider.   Ferrous Sulfate (iron supplement): HOLD 7 days before procedure.      Prep for procedure:     Procedure prep instructions reviewed.        Any additional information needed:  N/A      Patient  verbalized understanding and had no questions or concerns at this time.      Lynette Lewis RN  Endoscopy Procedure Pre Assessment RN  [phone] option 4

## 2024-04-09 NOTE — TELEPHONE ENCOUNTER
Incoming call from patient.    The patient states she has had a stomach flu the last few days and is wondering if she should continue with procedure.    The patient stats she is still not feeling very well today.  Writer discussed with patient the options, advising if not feeling completely well when she begins preps she should cancel.      After discussion the patient states she would like to reschedule for a later date.    Patient agrees to be transferred to scheduling to reschedule.    Corinne Kliber, RN  Endoscopy Procedure Pre Assessment RN  [phone] option 4

## 2024-04-09 NOTE — TELEPHONE ENCOUNTER
Caller: Antonietta    Reason for Reschedule/Cancellation   (please be detailed, any staff messages or encounters to note?): patient ill      Prior to reschedule please review:  Ordering Provider: Jose Malik   Sedation Determined: CS  Does patient have any ASC Exclusions, please identify?: y-CHELO      Notes on Cancelled Procedure:  Procedure: Lower Endoscopy [Colonoscopy]   Date: 4/12  Location: [facility]; [address] [place] [address]   Surgeon: Cassy      Rescheduled: Yes,   Procedure: Lower Endoscopy [Colonoscopy]    Date: 10/21   Location: [facility]; [address] [place] [address]    Surgeon: Koki   Sedation Level Scheduled  CS ,  Reason for Sedation Level order   Instructions updated and sent: y     Does patient need PAC or Pre -Op Rescheduled? : n       Did you cancel or rescheduled an EUS procedure? No.

## 2024-04-18 DIAGNOSIS — E11.65 TYPE 2 DIABETES MELLITUS WITH HYPERGLYCEMIA, WITHOUT LONG-TERM CURRENT USE OF INSULIN (H): ICD-10-CM

## 2024-04-18 DIAGNOSIS — I10 ESSENTIAL HYPERTENSION WITH GOAL BLOOD PRESSURE LESS THAN 140/90: ICD-10-CM

## 2024-04-18 RX ORDER — LOSARTAN POTASSIUM 100 MG/1
100 TABLET ORAL DAILY
Qty: 90 TABLET | Refills: 1 | OUTPATIENT
Start: 2024-04-18

## 2024-04-28 DIAGNOSIS — E11.65 TYPE 2 DIABETES MELLITUS WITH HYPERGLYCEMIA, WITHOUT LONG-TERM CURRENT USE OF INSULIN (H): ICD-10-CM

## 2024-04-29 RX ORDER — ASPIRIN 81 MG/1
81 TABLET, COATED ORAL DAILY
Qty: 90 TABLET | Refills: 1 | Status: SHIPPED | OUTPATIENT
Start: 2024-04-29

## 2024-05-22 ENCOUNTER — TELEPHONE (OUTPATIENT)
Dept: FAMILY MEDICINE | Facility: CLINIC | Age: 54
End: 2024-05-22
Payer: COMMERCIAL

## 2024-05-22 NOTE — TELEPHONE ENCOUNTER
Patient Quality Outreach    Patient is due for the following:   Colon Cancer Screening  Physical Annual Wellness Visit    Next Steps:   Schedule a Adult Preventative    Type of outreach:    Sent Ilusis message.    Next Steps:  Reach out within 90 days via Il[employer]is.    Max number of attempts reached: No. Will try again in 90 days if patient still on fail list.    Questions for provider review:    None           Jacqueline Hanks MA  Chart routed to Care Team.

## 2024-06-02 ENCOUNTER — HEALTH MAINTENANCE LETTER (OUTPATIENT)
Age: 54
End: 2024-06-02

## 2024-06-21 ENCOUNTER — VIRTUAL VISIT (OUTPATIENT)
Dept: FAMILY MEDICINE | Facility: CLINIC | Age: 54
End: 2024-06-21
Payer: COMMERCIAL

## 2024-06-21 DIAGNOSIS — R06.02 SOB (SHORTNESS OF BREATH): ICD-10-CM

## 2024-06-21 DIAGNOSIS — E11.65 TYPE 2 DIABETES MELLITUS WITH HYPERGLYCEMIA, WITHOUT LONG-TERM CURRENT USE OF INSULIN (H): ICD-10-CM

## 2024-06-21 DIAGNOSIS — I50.9 CONGESTIVE HEART FAILURE, UNSPECIFIED HF CHRONICITY, UNSPECIFIED HEART FAILURE TYPE (H): Primary | ICD-10-CM

## 2024-06-21 DIAGNOSIS — I10 ESSENTIAL HYPERTENSION WITH GOAL BLOOD PRESSURE LESS THAN 140/90: ICD-10-CM

## 2024-06-21 PROCEDURE — 99214 OFFICE O/P EST MOD 30 MIN: CPT | Mod: 95 | Performed by: PHYSICIAN ASSISTANT

## 2024-06-21 RX ORDER — FUROSEMIDE 20 MG
1 TABLET ORAL
COMMUNITY
Start: 2024-04-18

## 2024-06-21 RX ORDER — ONDANSETRON 4 MG/1
4 TABLET, ORALLY DISINTEGRATING ORAL EVERY 6 HOURS PRN
COMMUNITY
Start: 2024-06-18 | End: 2025-06-18

## 2024-06-21 NOTE — PROGRESS NOTES
"Antonietta is a 54 year old who is being evaluated via a billable video visit.    How would you like to obtain your AVS? MyChart  If the video visit is dropped, the invitation should be resent by: Text to cell phone: [phone]  Will anyone else be joining your video visit? No  {If patient encounters technical issues they should call [phone] :559371}    {PROVIDER CHARTING PREFERENCE:325895}    Subjective   Antonietta is a 54 year old, presenting for the following health issues:  Hospital F/U  {(!) Visit Details have not yet been documented.  Please enter Visit Details and then use this list to pull in documentation. (Optional):138424}  HPI       Hospital Follow-up Visit:    Hospital/Nursing Home/IP Rehab Facility: {INPT AND SNF DISCHARGE:071621}  Date of Admission: ***  Date of Discharge: ***  Reason(s) for Admission: ***  Was the patient in the ICU or did the patient experience delirium during hospitalization?  {No_YES (delirium):446459}  Do you have any other stressors you would like to discuss with your provider? { :550534}    Problems taking medications regularly:  {:495309}  Medication changes since discharge: {:819344}  Problems adhering to non-medication therapy:  {:105573}    Summary of hospitalization:  {:011423}  Diagnostic Tests/Treatments reviewed.  Follow up needed: {:189756:}  Other Healthcare Providers Involved in Patient s Care:         {those currently involved after discharge:789398::\"None\"}  Update since discharge: {:872428} {TIP  Include information from family/caregivers, SNF, Care Coordination :568019}        Plan of care communicated with {:879588}     {Reference  Coding guidelines- Moderate Complexity F2F/Video within 7 - 14 days of discharge [id number], High Complexity F2F/Video within 7 days [id number] or dkchrq16 days [id number] :977069}      {additonal problems for provider to add (Optional):239554}  {additonal problems for provider to add (Optional):270797}    {ROS Picklists (Optional):007111}    " "  Objective           Vitals:  No vitals were obtained today due to virtual visit.    Physical Exam   {video visit exam brief selected:173192}    {Diagnostic Test Results (Optional):476761}      Video-Visit Details    Type of service:  Video Visit   Originating Location (pt. Location): {video visit patient location:376072::\"Home\"}  {PROVIDER LOCATION On-site should be selected for visits conducted from your clinic location or adjoining [facility] hospital, academic office, or other nearby [facility] building. Off-site should be selected for all other provider locations, including home:541711}  Distant Location (provider location):  {virtual location provider:726308}  Platform used for Video Visit: {Virtual Visit Platforms:420251::\"OrangeHRM\"}  Signed Electronically by: Jose Malik PA-C  {Email feedback regarding this note to [email or web address]   :795102}  "

## 2024-06-21 NOTE — PROGRESS NOTES
"Antonietta is a 54 year old who is being evaluated via a billable video visit.    How would you like to obtain your AVS? MyChart  If the video visit is dropped, the invitation should be resent by: Text to cell phone: [phone]  Will anyone else be joining your video visit? No  {If patient encounters technical issues they should call [phone] :690347}    {PROVIDER CHARTING PREFERENCE:471036}    Subjective   Antonietta is a 54 year old, presenting for the following health issues:  RECHECK (UC visit 6/18/24) and Recheck Medication (Mounjaro)        6/21/2024     8:41 AM   Additional Questions   Roomed by Enrique BOUCHER LPN   Accompanied by Self     HPI     ED/UC Followup:    Facility:  [facility]   Date of visit: 06/18/2024  Reason for visit: Nausea, Vomiting, Probable food poisoning  Current Status: ***  {additonal problems for provider to add (Optional):467171}    {ROS Picklists (Optional):398047}      Objective           Vitals:  No vitals were obtained today due to virtual visit.    Physical Exam   {video visit exam brief selected:529121}    {Diagnostic Test Results (Optional):882348}      Video-Visit Details    Type of service:  Video Visit   Originating Location (pt. Location): {video visit patient location:213813::\"Home\"}  {PROVIDER LOCATION On-site should be selected for visits conducted from your clinic location or adjoining [facility] hospital, academic office, or other nearby [facility] building. Off-site should be selected for all other provider locations, including home:293681}  Distant Location (provider location):  {virtual location provider:007157}  Platform used for Video Visit: {Virtual Visit Platforms:450459::\"Scayl\"}  Signed Electronically by: Jose Malik PA-C  {Email feedback regarding this note to [email or web address]   :174110}  "

## 2024-06-21 NOTE — PROGRESS NOTES
Antonietta is a 54 year old who is being evaluated via a billable video visit.    How would you like to obtain your AVS? MyChart  If the video visit is dropped, the invitation should be resent by: Text to cell phone: [phone]  Will anyone else be joining your video visit? No      Assessment & Plan       ICD-10-CM    1. Congestive heart failure, unspecified HF chronicity, unspecified heart failure type (H)  I50.9       2. SOB (shortness of breath)  R06.02       3. Type 2 diabetes mellitus with hyperglycemia, without long-term current use of insulin (H)  E11.65 Comprehensive metabolic panel (BMP + Alb, Alk Phos, ALT, AST, Total. Bili, TP)     Hemoglobin A1c     tirzepatide (MOUNJARO) 10 MG/0.5ML pen      4. Essential hypertension with goal blood pressure less than 140/90  I10       Talk to patient about her concerns.  Overall her symptoms have improved.  She has been working on dietary changes.  We will increase her Mounjaro to 10 mg weekly.  Recheck in the clinic in 4 weeks.  Warning signs side effects were discussed.  Continue with diet and exercise.      MED REC REQUIRED  Post Medication Reconciliation Status:     BMI    Subjective   Antonietta is a 54 year old, presenting for the following health issues:  Recheck Medication (Mounjaro) and Hospital F/U (04/18/2024)        6/21/2024     8:41 AM   Additional Questions   Roomed by Enrique BOUCHER LPN   Accompanied by Self     HPI       Hospital Follow-up Visit:    Hospital/Nursing Home/IP Rehab Facility:  [facility]   Date of Admission: 4/17/2024  Date of Discharge: 04/18/2024  Reason(s) for Admission: Breathing Problem, Fluid in lungs   Was the patient in the ICU or did the patient experience delirium during hospitalization?  No  Do you have any other stressors you would like to discuss with your provider? No    Problems taking medications regularly:  None  Medication changes since discharge: None  Problems adhering to non-medication therapy:  None    Summary of  "hospitalization:  Discharge summary unavailable  Diagnostic Tests/Treatments reviewed.  Follow up needed: none  Other Healthcare Providers Involved in Patient s Care:         None  Update since discharge: improved.       Care Everywhere Reviewed  Hospital Course   Antonietta Christie is a 54 y.o. female with a past medical history significant for morbid obesity, CHELO, hypertension, restless leg who presents with 24 hours of shortness of breath and orthopnea. Patient states that she has not been compliant with her medications nor her CPAP over the last few weeks. She has noticed some increase in her lower extremity swelling. In the last 24 hours she has had difficulty with laying flat and last night had to sit up for long period of time. She has had some \"irritating cough\". She denies any fevers or chills. She denies any sick contacts outside of a nephew who may be had a GI bug a couple weeks ago.    She received IV diuresis with improvement in her symptoms and was transitioned to oral. She had an ECHO which showed normal EF with mild LVH. She was counseled on the importance of medication adherence and advised to follow up with PCP.     Recommendations for Outpatient Provider PCP: ANDRE Arrieta     Admission: 4/17/2024 @ [facility]    Specific recommendations to be addressed at the follow up visit: BP med mgmt   Medication changes: see below   Follow up labs/imaging: none   Other specialty follow-up not included in DC orders: none     She comments that she has been eatting a lot of olives and pickles at the time that her symptoms started with shortness of breath and leg swelling.  Overall she is work on dietary changes.  She is finished with the Lasix medications and she states overall she is doing much better.    History of diabetes and been on Mounjaro and she is wanting to increase the dose as she has not noticed much weight changes.    Plan of care communicated with patient               Review of " Systems  Constitutional, HEENT, cardiovascular, pulmonary, gi and gu systems are negative, except as otherwise noted.      Objective         Vitals:  No vitals were obtained today due to virtual visit.    Physical Exam   GENERAL: alert and no distress  EYES: Eyes grossly normal to inspection.  No discharge or erythema, or obvious scleral/conjunctival abnormalities.  RESP: No audible wheeze, cough, or visible cyanosis.    SKIN: Visible skin clear. No significant rash, abnormal pigmentation or lesions.  NEURO: Cranial nerves grossly intact.  Mentation and speech appropriate for age.  PSYCH: Appropriate affect, tone, and pace of words          Video-Visit Details    Type of service:  Video Visit   Originating Location (pt. Location): Home    Distant Location (provider location):  Off-site  Platform used for Video Visit: Zechariah  Signed Electronically by: Jose Malik PA-C

## 2024-07-28 DIAGNOSIS — G25.81 RESTLESS LEGS SYNDROME (RLS): ICD-10-CM

## 2024-07-29 DIAGNOSIS — I10 ESSENTIAL HYPERTENSION WITH GOAL BLOOD PRESSURE LESS THAN 140/90: ICD-10-CM

## 2024-07-29 RX ORDER — ROPINIROLE 5 MG/1
TABLET, FILM COATED ORAL
Qty: 90 TABLET | Refills: 2 | Status: SHIPPED | OUTPATIENT
Start: 2024-07-29

## 2024-07-30 RX ORDER — HYDROCHLOROTHIAZIDE 25 MG/1
25 TABLET ORAL DAILY
Qty: 90 TABLET | Refills: 0 | Status: SHIPPED | OUTPATIENT
Start: 2024-07-30

## 2024-08-11 ENCOUNTER — HEALTH MAINTENANCE LETTER (OUTPATIENT)
Age: 54
End: 2024-08-11

## 2024-08-27 DIAGNOSIS — E11.65 TYPE 2 DIABETES MELLITUS WITH HYPERGLYCEMIA, WITHOUT LONG-TERM CURRENT USE OF INSULIN (H): ICD-10-CM

## 2024-08-27 RX ORDER — TIRZEPATIDE 10 MG/.5ML
10 INJECTION, SOLUTION SUBCUTANEOUS WEEKLY
Qty: 2 ML | Refills: 0 | Status: SHIPPED | OUTPATIENT
Start: 2024-08-27 | End: 2024-09-26

## 2024-08-27 NOTE — LETTER
August 27, 2024    Antonietta Christie  [address]  [place] [address]    Dear Antonietta,       We recently received a refill request for tirzepatide (MOUNJARO) 10 MG/0.5ML pen .  We have refilled this for one time only because you are due for a:    Diabetes office visit      Please call at your earliest convenience so that there will not be a delay with your future refills.          Thank you,   Your [facility] Team/  [phone]

## 2024-10-04 NOTE — TELEPHONE ENCOUNTER
Pre assessment completed for upcoming procedure.   (Please see previous telephone encounter notes for complete details)      Procedure details:    Arrival time and facility location reviewed.    Pre op exam needed? No.    Designated  policy reviewed. Instructed to have someone stay 6  hours post procedure.       Medication review:    Medications reviewed. Please see supporting documentation below. Holding recommendations discussed (if applicable).   Oral diabetic medication(s): Metformin (glucophage): HOLD day of procedure.  Injectable diabetic medication(s): Mounjaro (Tirzepatide).  Weekly dosing of medication.  HOLD 7 days before procedure.  Follow up with managing provider.   Ferrous Sulfate (iron supplement): HOLD 7 days before procedure.      Prep for procedure:     Procedure prep instructions reviewed.        Any additional information needed:  N/A      Patient  verbalized understanding and had no questions or concerns at this time.      Olamide Peng RN  Endoscopy Procedure Pre Assessment   [phone] option 2

## 2024-10-21 ENCOUNTER — HOSPITAL ENCOUNTER (OUTPATIENT)
Facility: CLINIC | Age: 54
Discharge: HOME | End: 2024-10-21
Attending: INTERNAL MEDICINE | Admitting: INTERNAL MEDICINE
Payer: COMMERCIAL

## 2024-10-21 VITALS
HEART RATE: 71 BPM | RESPIRATION RATE: 16 BRPM | SYSTOLIC BLOOD PRESSURE: 119 MMHG | OXYGEN SATURATION: 93 % | DIASTOLIC BLOOD PRESSURE: 88 MMHG

## 2024-10-21 DIAGNOSIS — R79.0 LOW FERRITIN: Primary | ICD-10-CM

## 2024-10-21 DIAGNOSIS — I10 ESSENTIAL HYPERTENSION WITH GOAL BLOOD PRESSURE LESS THAN 140/90: ICD-10-CM

## 2024-10-21 LAB
COLONOSCOPY: NORMAL
GLUCOSE BLDC GLUCOMTR-MCNC: 292 MG/DL (ref 70–99)

## 2024-10-21 PROCEDURE — 45382 COLONOSCOPY W/CONTROL BLEED: CPT | Performed by: INTERNAL MEDICINE

## 2024-10-21 PROCEDURE — 250N000011 HC RX IP 250 OP 636: Performed by: INTERNAL MEDICINE

## 2024-10-21 PROCEDURE — 99153 MOD SED SAME PHYS/QHP EA: CPT | Performed by: INTERNAL MEDICINE

## 2024-10-21 PROCEDURE — 45385 COLONOSCOPY W/LESION REMOVAL: CPT | Mod: PT | Performed by: INTERNAL MEDICINE

## 2024-10-21 PROCEDURE — 88305 TISSUE EXAM BY PATHOLOGIST: CPT | Mod: TC | Performed by: INTERNAL MEDICINE

## 2024-10-21 PROCEDURE — 82962 GLUCOSE BLOOD TEST: CPT

## 2024-10-21 PROCEDURE — 88305 TISSUE EXAM BY PATHOLOGIST: CPT | Mod: 26 | Performed by: PATHOLOGY

## 2024-10-21 PROCEDURE — G0500 MOD SEDAT ENDO SERVICE >5YRS: HCPCS | Mod: PT | Performed by: INTERNAL MEDICINE

## 2024-10-21 RX ORDER — PROCHLORPERAZINE MALEATE 5 MG/1
10 TABLET ORAL EVERY 6 HOURS PRN
Status: DISCONTINUED | OUTPATIENT
Start: 2024-10-21 | End: 2024-10-21 | Stop reason: HOSPADM

## 2024-10-21 RX ORDER — LIDOCAINE 40 MG/G
CREAM TOPICAL
Status: DISCONTINUED | OUTPATIENT
Start: 2024-10-21 | End: 2024-10-21 | Stop reason: HOSPADM

## 2024-10-21 RX ORDER — FLUMAZENIL 0.1 MG/ML
0.2 INJECTION, SOLUTION INTRAVENOUS
Status: DISCONTINUED | OUTPATIENT
Start: 2024-10-21 | End: 2024-10-21 | Stop reason: HOSPADM

## 2024-10-21 RX ORDER — FENTANYL CITRATE 50 UG/ML
INJECTION, SOLUTION INTRAMUSCULAR; INTRAVENOUS PRN
Status: DISCONTINUED | OUTPATIENT
Start: 2024-10-21 | End: 2024-10-21 | Stop reason: HOSPADM

## 2024-10-21 RX ORDER — ONDANSETRON 4 MG/1
4 TABLET, ORALLY DISINTEGRATING ORAL EVERY 6 HOURS PRN
Status: DISCONTINUED | OUTPATIENT
Start: 2024-10-21 | End: 2024-10-21 | Stop reason: HOSPADM

## 2024-10-21 RX ORDER — NALOXONE HYDROCHLORIDE 0.4 MG/ML
0.2 INJECTION, SOLUTION INTRAMUSCULAR; INTRAVENOUS; SUBCUTANEOUS
Status: DISCONTINUED | OUTPATIENT
Start: 2024-10-21 | End: 2024-10-21 | Stop reason: HOSPADM

## 2024-10-21 RX ORDER — ONDANSETRON 2 MG/ML
4 INJECTION INTRAMUSCULAR; INTRAVENOUS
Status: DISCONTINUED | OUTPATIENT
Start: 2024-10-21 | End: 2024-10-21 | Stop reason: HOSPADM

## 2024-10-21 RX ORDER — NALOXONE HYDROCHLORIDE 0.4 MG/ML
0.4 INJECTION, SOLUTION INTRAMUSCULAR; INTRAVENOUS; SUBCUTANEOUS
Status: DISCONTINUED | OUTPATIENT
Start: 2024-10-21 | End: 2024-10-21 | Stop reason: HOSPADM

## 2024-10-21 RX ORDER — ONDANSETRON 2 MG/ML
4 INJECTION INTRAMUSCULAR; INTRAVENOUS EVERY 6 HOURS PRN
Status: DISCONTINUED | OUTPATIENT
Start: 2024-10-21 | End: 2024-10-21 | Stop reason: HOSPADM

## 2024-10-21 ASSESSMENT — ACTIVITIES OF DAILY LIVING (ADL)
ADLS_ACUITY_SCORE: 35

## 2024-10-21 NOTE — OR NURSING
Patient had colonoscopy with polypectomy x11 and clips placement x12. Patient tolerated procedure under conscious sedation and 2 liters nasal cannula.

## 2024-10-21 NOTE — PROGRESS NOTES
ENDOSCOPY PRE-SEDATION H&P FOR OUTPATIENT PROCEDURES    Antonietta Christie  [id number]  1970    Procedure: Colonoscopy     Pre-procedure diagnosis: Screening    Past medical history:   Past Medical History:   Diagnosis Date    Autoimmune hepatitis (H) 2005    Hx of 2000s    Hypertension goal BP (blood pressure) < 140/90 08/13/2013    Insomnia     Major depression 08/07/2017    Obesity     RX w/ phentiramine & topiramate in 2011    CHELO (obstructive sleep apnea)- Moderate (AHI 26)     Peripheral edema 08/01/2012    Restless legs syndrome (RLS)     Type 2 diabetes, HbA1c goal < 7% (H)      Patient Active Problem List   Diagnosis    GERD (gastroesophageal reflux disease)    Bunion of great toe of left foot    Restless legs syndrome (RLS)    Peripheral edema    Carpal tunnel syndrome    Essential hypertension with goal blood pressure less than 140/90    Seborrheic keratosis    CHELO (obstructive sleep apnea)- Moderate (AHI 26)    Type 2 diabetes mellitus with hyperglycemia, without long-term current use of insulin (H)    Morbid obesity with BMI of 50.0-59.9, adult (H)    Hyperlipidemia LDL goal <70    Low ferritin       Past surgical history:   Past Surgical History:   Procedure Laterality Date    TONSILLECTOMY & ADENOIDECTOMY  1979       No current outpatient medications on file.     No current facility-administered medications for this visit.       Allergies   Allergen Reactions    Lisinopril Cough    No Clinical Screening - See Comments Other (See Comments)     Hay fever (fall)--runny nose, sneezing, itchy eyes  Manganese violet dye in make up--red, itchy, mattery eyes       Physical Exam:    Mental status: alert  Heart: Normal  Lung: Normal  Assessment of patient's airway: Normal  Other as pertinent for procedure: None     Lab Results   Component Value Date    WBC 5.0 02/04/2021     Lab Results   Component Value Date    RBC 4.72 02/04/2021     Lab Results   Component Value Date    HGB 13.4 02/04/2021     Lab Results  "  Component Value Date    HCT 42.5 02/04/2021     Lab Results   Component Value Date    MCV 90 02/04/2021     Lab Results   Component Value Date    MCH 28.4 02/04/2021     Lab Results   Component Value Date    MCHC 31.5 02/04/2021     Lab Results   Component Value Date    RDW 13.0 02/04/2021     Lab Results   Component Value Date     02/04/2021     No results found for: \"INR\"     ASA Score: See Provation note    Mallampati score:  I - Faucial pillars, soft palate, and uvula are visible    Assessment/Plan:     The patient is an appropriate candidate to receive sedation.    Informed consent was discussed with the patient/family, including the risks, benefits, potential complications and any alternative options associated with sedation.    Patient assessment completed just prior to sedation and while under constant observation by the provider. Condition determined to be adequate for proceeding with sedation.    The specific risks for the procedure were discussed with the patient at the time of informed consent and include but are not limited to perforation which could require surgery, missing significant neoplasm or lesion, hemorrhage and adverse sedative complication.      Tyron Telles MD                 "

## 2024-10-22 LAB
PATH REPORT.COMMENTS IMP SPEC: NORMAL
PATH REPORT.FINAL DX SPEC: NORMAL
PATH REPORT.GROSS SPEC: NORMAL
PATH REPORT.MICROSCOPIC SPEC OTHER STN: NORMAL
PATH REPORT.RELEVANT HX SPEC: NORMAL
PHOTO IMAGE: NORMAL

## 2024-10-22 RX ORDER — HYDROCHLOROTHIAZIDE 25 MG/1
25 TABLET ORAL DAILY
Qty: 90 TABLET | Refills: 0 | Status: SHIPPED | OUTPATIENT
Start: 2024-10-22

## 2024-10-23 DIAGNOSIS — D36.9 ADENOMATOUS POLYPS: Primary | ICD-10-CM

## 2024-10-23 NOTE — RESULT ENCOUNTER NOTE
"Ms. Christie:    The polyps that were removed from you colon were a particular type of adenoma called sessile serrated adenomas. As in my prior note, there was no evidence of cancer.    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 As we discussed, your sibling and parents should also talk to their health care providers about undergoing colonoscopy.    If you have any questions, please contact the Gastroenterology nurse at [phone].     - Dr. Telles"

## 2024-10-23 NOTE — RESULT ENCOUNTER NOTE
"Ms. Christie -     I am writing to let you know the pathology results from the polyps that were removed at the time of your colonoscopy.  The polyps returned as \"adenomatous polyps\".  An adenoma is a type of benign polyp. If left in place for years, adenomas have a small risk of developing cancer.There was no cancer in your polyps.  Your polyps were completely removed. However, you are at risk to grow more adenomatous polyps in the future.      Because of the number and size of adenomatous polyps that you had, I recommend that you repeat a colonoscopy to screen for new polyps in one (1) year.  I suggest that you discuss this with your primary care physician or provider at that time.    Finally, due to the number of polyps, please make sure to let any first degree family members know that you had an adenomatous polyp as it may affect when they have a colonoscopy.  If you have any questions, please don't hesitate to contact the Gastroenterology nurse at [phone].       Tyron Telles MD  Professor of Medicine  Division of Gastroenterology, Hepatology, and Nutrition  [facility] "

## 2024-10-27 NOTE — TELEPHONE ENCOUNTER
"Pre visit planning completed.      Procedure details:    Patient scheduled for Colonoscopy on 10/21/2024.     Arrival time: 0700. Procedure time 0800    Facility location: [facility]; [address], [address], [place] [address]. Check in location: Main entrance at registration desk.    Sedation type: Conscious sedation     Pre op exam needed? No.    Indication for procedure: Screening for malignant neoplasm of the rectum [Z12.12]       Chart review:     Electronic implanted devices? No    Recent diagnosis of diverticulitis within the last 6 weeks? No      Medication review:    Diabetic? Yes. Oral diabetic medications: Metformin (glucophage): HOLD day of procedure.  Diabetic injectables: Mounjaro (Tirzepatide).  Weekly dosing of medication.  HOLD 7 days before procedure.  Follow up with managing provider.     Anticoagulants? No    Weight loss medication/injectable? No. Patient is on GLP-1 medication but for DM (see above).    Other medication HOLDING recommendations:  Ferrous sulfate (iron supplements): HOLD 7 days before procedure.      Prep for procedure:     Bowel prep recommendation: Extended Golytely. Bowel prep prescription sent to [facility] [address] [place] [facility] [address]   Due to: BMI > 40.     Prep instructions sent via jessica Peng RN  Endoscopy Procedure Pre Assessment RN  [phone] option 2     equal, round, and reactive to light.   Cardiovascular:      Rate and Rhythm: Normal rate and regular rhythm.      Heart sounds: Normal heart sounds.      No friction rub.   Pulmonary:      Effort: Pulmonary effort is normal. No respiratory distress.      Breath sounds: Normal breath sounds.   Abdominal:      General: Bowel sounds are normal.      Palpations: Abdomen is soft.   Skin:     General: Skin is warm and dry.   Neurological:      General: No focal deficit present.      Mental Status: He is alert.      Cranial Nerves: No cranial nerve deficit.   Psychiatric:         Mood and Affect: Mood normal.         Behavior: Behavior normal.         Critical Care    Performed by: Deandre Mosher MD  Authorized by: Jose Santiago YUKI]      ED Course User Index  [YUKI] Deandre Mosher [id number]     :  [id number]        [id number]     :  [id number]        

## 2024-11-08 ENCOUNTER — OFFICE VISIT (OUTPATIENT)
Dept: FAMILY MEDICINE | Facility: CLINIC | Age: 54
End: 2024-11-08
Payer: COMMERCIAL

## 2024-11-08 VITALS
TEMPERATURE: 97.4 F | DIASTOLIC BLOOD PRESSURE: 81 MMHG | HEIGHT: 69 IN | SYSTOLIC BLOOD PRESSURE: 122 MMHG | RESPIRATION RATE: 18 BRPM | OXYGEN SATURATION: 97 % | HEART RATE: 107 BPM | BODY MASS INDEX: 43.4 KG/M2 | WEIGHT: 293 LBS

## 2024-11-08 DIAGNOSIS — R19.7 DIARRHEA, UNSPECIFIED TYPE: ICD-10-CM

## 2024-11-08 DIAGNOSIS — G47.33 OSA (OBSTRUCTIVE SLEEP APNEA): Chronic | ICD-10-CM

## 2024-11-08 DIAGNOSIS — E11.65 TYPE 2 DIABETES MELLITUS WITH HYPERGLYCEMIA, WITHOUT LONG-TERM CURRENT USE OF INSULIN (H): Primary | ICD-10-CM

## 2024-11-08 DIAGNOSIS — Z12.31 ENCOUNTER FOR SCREENING MAMMOGRAM FOR MALIGNANT NEOPLASM OF BREAST: ICD-10-CM

## 2024-11-08 DIAGNOSIS — G25.81 RESTLESS LEGS SYNDROME (RLS): ICD-10-CM

## 2024-11-08 DIAGNOSIS — I10 ESSENTIAL HYPERTENSION WITH GOAL BLOOD PRESSURE LESS THAN 140/90: ICD-10-CM

## 2024-11-08 LAB
ERYTHROCYTE [DISTWIDTH] IN BLOOD BY AUTOMATED COUNT: 11.6 % (ref 10–15)
FERRITIN SERPL-MCNC: 115 NG/ML (ref 11–328)
HCT VFR BLD AUTO: 44 % (ref 35–47)
HGB BLD-MCNC: 14.9 G/DL (ref 11.7–15.7)
MCH RBC QN AUTO: 30.8 PG (ref 26.5–33)
MCHC RBC AUTO-ENTMCNC: 33.9 G/DL (ref 31.5–36.5)
MCV RBC AUTO: 91 FL (ref 78–100)
PLATELET # BLD AUTO: 238 10E3/UL (ref 150–450)
RBC # BLD AUTO: 4.84 10E6/UL (ref 3.8–5.2)
WBC # BLD AUTO: 7.8 10E3/UL (ref 4–11)

## 2024-11-08 PROCEDURE — 90673 RIV3 VACCINE NO PRESERV IM: CPT | Performed by: PHYSICIAN ASSISTANT

## 2024-11-08 PROCEDURE — 99214 OFFICE O/P EST MOD 30 MIN: CPT | Mod: 25 | Performed by: PHYSICIAN ASSISTANT

## 2024-11-08 PROCEDURE — 90471 IMMUNIZATION ADMIN: CPT | Performed by: PHYSICIAN ASSISTANT

## 2024-11-08 PROCEDURE — 99207 PR FOOT EXAM NO CHARGE: CPT | Performed by: PHYSICIAN ASSISTANT

## 2024-11-08 PROCEDURE — 85027 COMPLETE CBC AUTOMATED: CPT | Performed by: PHYSICIAN ASSISTANT

## 2024-11-08 PROCEDURE — 82728 ASSAY OF FERRITIN: CPT | Performed by: PHYSICIAN ASSISTANT

## 2024-11-08 PROCEDURE — 36415 COLL VENOUS BLD VENIPUNCTURE: CPT | Performed by: PHYSICIAN ASSISTANT

## 2024-11-08 PROCEDURE — 91320 SARSCV2 VAC 30MCG TRS-SUC IM: CPT | Performed by: PHYSICIAN ASSISTANT

## 2024-11-08 PROCEDURE — 90480 ADMN SARSCOV2 VAC 1/ONLY CMP: CPT | Performed by: PHYSICIAN ASSISTANT

## 2024-11-08 RX ORDER — TIRZEPATIDE 12.5 MG/.5ML
12.5 INJECTION, SOLUTION SUBCUTANEOUS
Qty: 6 ML | Refills: 0 | Status: SHIPPED | OUTPATIENT
Start: 2024-11-08 | End: 2025-02-06

## 2024-11-08 RX ORDER — LOSARTAN POTASSIUM 100 MG/1
100 TABLET ORAL DAILY
Qty: 90 TABLET | Refills: 1 | Status: SHIPPED | OUTPATIENT
Start: 2024-11-08

## 2024-11-08 RX ORDER — ATORVASTATIN CALCIUM 10 MG/1
10 TABLET, FILM COATED ORAL DAILY
Qty: 90 TABLET | Refills: 1 | Status: SHIPPED | OUTPATIENT
Start: 2024-11-08

## 2024-11-08 RX ORDER — GABAPENTIN 300 MG/1
CAPSULE ORAL
Qty: 180 CAPSULE | Refills: 3 | Status: SHIPPED | OUTPATIENT
Start: 2024-11-08

## 2024-11-08 ASSESSMENT — PAIN SCALES - GENERAL: PAINLEVEL_OUTOF10: NO PAIN (0)

## 2024-11-08 NOTE — PROGRESS NOTES
"  Assessment & Plan       ICD-10-CM    1. Type 2 diabetes mellitus with hyperglycemia, without long-term current use of insulin (H)  E11.65 Albumin Random Urine Quantitative with Creat Ratio     Tirzepatide (MOUNJARO) 12.5 MG/0.5ML SOAJ     losartan (COZAAR) 100 MG tablet     atorvastatin (LIPITOR) 10 MG tablet     FOOT EXAM     Albumin Random Urine Quantitative with Creat Ratio      2. Essential hypertension with goal blood pressure less than 140/90  I10 CBC with Platelets     losartan (COZAAR) 100 MG tablet     CBC with Platelets      3. Restless legs syndrome (RLS)  G25.81 gabapentin (NEURONTIN) 300 MG capsule     Ferritin     Ferritin      4. Encounter for screening mammogram for malignant neoplasm of breast  Z12.31 MA Screen Bilateral w/Chris      5. Diarrhea, unspecified type  R19.7 Listeria culture     Enteric Bacteria and Virus Panel by RICKI Stool     Enteric Bacteria and Virus Panel by RICKI Stool     Listeria culture      6. CHELO (obstructive sleep apnea)- Moderate (AHI 26)  G47.33       Work on Healthy diet and exercise. Getting heart rate elevated for 30 mins most days of week.  medical conditions are stable. meds refilled.  Will increase mounjaro to 12.5mg weekly. Recheck 3 months. warning signs discussed. side effects discussed  Labs pending.     The longitudinal plan of care for the diagnosis(es)/condition(s) as documented were addressed during this visit. Due to the added complexity in care, I will continue to support Antonietta in the subsequent management and with ongoing continuity of care.    BMI  Estimated body mass index is 49.91 kg/m  as calculated from the following:    Height as of this encounter: 1.753 m (5' 9\").    Weight as of this encounter: 153.3 kg (338 lb).   Weight management plan: Discussed healthy diet and exercise guidelines      Subjective   Antonietta is a 54 year old, presenting for the following health issues:  Diabetes    History of Present Illness       Diabetes:   She presents for follow " up of diabetes.    She is not checking blood glucose.        She is concerned about other.   She is having blurry vision.            She eats 0-1 servings of fruits and vegetables daily.She consumes 3 sweetened beverage(s) daily.She exercises with enough effort to increase her heart rate 9 or less minutes per day.  She exercises with enough effort to increase her heart rate 3 or less days per week. She is missing 1 dose(s) of medications per week.  She is not taking prescribed medications regularly due to remembering to take.     Diabetes Follow-up    How often are you checking your blood sugar? Not at all  What concerns do you have today about your diabetes? None   Do you have any of these symptoms? (Select all that apply)  No numbness or tingling in feet.  No redness, sores or blisters on feet.  No complaints of excessive thirst.  No reports of blurry vision.  No significant changes to weight.          Hyperlipidemia Follow-Up    Are you regularly taking any medication or supplement to lower your cholesterol?   Yes- lipitor  Are you having muscle aches or other side effects that you think could be caused by your cholesterol lowering medication?  No    Hypertension Follow-up    Do you check your blood pressure regularly outside of the clinic? No   Are you following a low salt diet? No  Are your blood pressures ever more than 140 on the top number (systolic) OR more   than 90 on the bottom number (diastolic), for example 140/90? No    Possible exposure to listeria with possible contaminated fluids that she purchased from the store.  She got a notice in the mail about possible exposure.- upset stomach . Late summer, was having diarrhea. Overall feeling better. Continues of had off and on upset stomach. No nausea or vomiting. No fevers.     Patient has a history of sleep apnea and restless leg.  She has seen sleep clinic in the past.  She is requesting refills on her medications.  Overall she feels like they are  "mostly stable.  BP Readings from Last 2 Encounters:   11/08/24 122/81   10/21/24 119/88     Hemoglobin A1C (%)   Date Value   01/11/2024 9.7 (H)   09/21/2023 8.5 (H)   02/04/2021 9.2 (H)   01/16/2020 6.0 (H)     LDL Cholesterol Calculated (mg/dL)   Date Value   01/11/2024 54   09/21/2023 52   02/04/2021 64   01/16/2020 76         Review of Systems  Constitutional, HEENT, cardiovascular, pulmonary, gi and gu systems are negative, except as otherwise noted.      Objective    /81   Pulse 107   Temp 97.4  F (36.3  C) (Tympanic)   Resp 18   Ht 1.753 m (5' 9\")   Wt (!) 153.3 kg (338 lb)   LMP  (LMP Unknown)   SpO2 97%   BMI 49.91 kg/m    Body mass index is 49.91 kg/m .  Physical Exam   GENERAL: alert and no distress  EYES: Eyes grossly normal to inspection, PERRL and conjunctivae and sclerae normal  HENT: ear canals and TM's normal, nose and mouth without ulcers or lesions  NECK: no adenopathy, no asymmetry, masses, or scars  RESP: lungs clear to auscultation - no rales, rhonchi or wheezes  CV: regular rate and rhythm, normal S1 S2, no S3 or S4, no murmur, click or rub, no peripheral edema  ABDOMEN: soft, nontender, no hepatosplenomegaly, no masses and bowel sounds normal  MS: no gross musculoskeletal defects noted, no edema  SKIN: no suspicious lesions or rashes  NEURO: Normal strength and tone, mentation intact and speech normal  PSYCH: mentation appears normal, affect normal/bright  Diabetic foot exam: normal DP and PT pulses, no trophic changes or ulcerative lesions, normal sensory exam, and normal monofilament exam    Labs pending        Signed Electronically by: Jose Malik PA-C    "

## 2024-11-11 ENCOUNTER — PATIENT OUTREACH (OUTPATIENT)
Dept: CARE COORDINATION | Facility: CLINIC | Age: 54
End: 2024-11-11
Payer: COMMERCIAL

## 2024-11-11 NOTE — RESULT ENCOUNTER NOTE
Please help patient make another lab appointment.   For some reason not all the labs got drawn.     Thanks  Jose Malik PA-C

## 2025-01-19 DIAGNOSIS — I10 ESSENTIAL HYPERTENSION WITH GOAL BLOOD PRESSURE LESS THAN 140/90: ICD-10-CM

## 2025-01-21 RX ORDER — HYDROCHLOROTHIAZIDE 25 MG/1
25 TABLET ORAL DAILY
Qty: 90 TABLET | Refills: 0 | Status: SHIPPED | OUTPATIENT
Start: 2025-01-21

## 2025-02-10 ENCOUNTER — TELEPHONE (OUTPATIENT)
Dept: FAMILY MEDICINE | Facility: CLINIC | Age: 55
End: 2025-02-10
Payer: COMMERCIAL

## 2025-02-10 NOTE — TELEPHONE ENCOUNTER
Clinic RN: Please investigate patient's chart or contact patient if the information cannot be found because the medication is listed as historical or discontinued. Confirm patient is taking this medication. Document findings and route refill encounter to provider for approval or denial. Not on med list.     Bambi Velázquez, RN

## 2025-02-10 NOTE — TELEPHONE ENCOUNTER
"Medication fell off patient list because it has a \"end\" date of 2/6/25 associated with the last refill.          Please review and advise on request.        Sara Yu RN  Clinical Triage/Primary Care  [facility]    "

## 2025-02-10 NOTE — TELEPHONE ENCOUNTER
Patient calling for refill of Tirzepatide (MOUNJARO) 12.5 MG/0.5ML SOAJ. Patient is scheduled for 2/18, unable to pend med, pharmacy pending.          Jr Diallo

## 2025-02-18 ENCOUNTER — OFFICE VISIT (OUTPATIENT)
Dept: FAMILY MEDICINE | Facility: CLINIC | Age: 55
End: 2025-02-18
Payer: COMMERCIAL

## 2025-02-18 ENCOUNTER — TELEPHONE (OUTPATIENT)
Dept: FAMILY MEDICINE | Facility: CLINIC | Age: 55
End: 2025-02-18

## 2025-02-18 VITALS
HEIGHT: 70 IN | OXYGEN SATURATION: 100 % | BODY MASS INDEX: 41.95 KG/M2 | WEIGHT: 293 LBS | TEMPERATURE: 97 F | SYSTOLIC BLOOD PRESSURE: 138 MMHG | DIASTOLIC BLOOD PRESSURE: 83 MMHG | RESPIRATION RATE: 16 BRPM | HEART RATE: 86 BPM

## 2025-02-18 DIAGNOSIS — E78.5 HYPERLIPIDEMIA LDL GOAL <70: ICD-10-CM

## 2025-02-18 DIAGNOSIS — E11.65 TYPE 2 DIABETES MELLITUS WITH HYPERGLYCEMIA, WITHOUT LONG-TERM CURRENT USE OF INSULIN (H): Primary | ICD-10-CM

## 2025-02-18 DIAGNOSIS — I10 ESSENTIAL HYPERTENSION WITH GOAL BLOOD PRESSURE LESS THAN 140/90: ICD-10-CM

## 2025-02-18 LAB
ALBUMIN SERPL BCG-MCNC: 4.1 G/DL (ref 3.5–5.2)
ALP SERPL-CCNC: 78 U/L (ref 40–150)
ALT SERPL W P-5'-P-CCNC: 52 U/L (ref 0–50)
ANION GAP SERPL CALCULATED.3IONS-SCNC: 10 MMOL/L (ref 7–15)
AST SERPL W P-5'-P-CCNC: 35 U/L (ref 0–45)
BILIRUB SERPL-MCNC: 0.4 MG/DL
BUN SERPL-MCNC: 18.3 MG/DL (ref 6–20)
CALCIUM SERPL-MCNC: 9.9 MG/DL (ref 8.8–10.4)
CHLORIDE SERPL-SCNC: 101 MMOL/L (ref 98–107)
CHOLEST SERPL-MCNC: 120 MG/DL
CREAT SERPL-MCNC: 0.84 MG/DL (ref 0.51–0.95)
CREAT UR-MCNC: 181 MG/DL
EGFRCR SERPLBLD CKD-EPI 2021: 82 ML/MIN/1.73M2
EST. AVERAGE GLUCOSE BLD GHB EST-MCNC: 194 MG/DL
FASTING STATUS PATIENT QL REPORTED: NO
FASTING STATUS PATIENT QL REPORTED: NO
GLUCOSE SERPL-MCNC: 241 MG/DL (ref 70–99)
HBA1C MFR BLD: 8.4 % (ref 0–5.6)
HCO3 SERPL-SCNC: 27 MMOL/L (ref 22–29)
HDLC SERPL-MCNC: 38 MG/DL
LDLC SERPL CALC-MCNC: 52 MG/DL
MICROALBUMIN UR-MCNC: 14.1 MG/L
MICROALBUMIN/CREAT UR: 7.79 MG/G CR (ref 0–25)
NONHDLC SERPL-MCNC: 82 MG/DL
POTASSIUM SERPL-SCNC: 4.5 MMOL/L (ref 3.4–5.3)
PROT SERPL-MCNC: 7.5 G/DL (ref 6.4–8.3)
SODIUM SERPL-SCNC: 138 MMOL/L (ref 135–145)
TRIGL SERPL-MCNC: 150 MG/DL

## 2025-02-18 PROCEDURE — 99214 OFFICE O/P EST MOD 30 MIN: CPT | Performed by: PHYSICIAN ASSISTANT

## 2025-02-18 PROCEDURE — 82570 ASSAY OF URINE CREATININE: CPT | Performed by: PHYSICIAN ASSISTANT

## 2025-02-18 PROCEDURE — 82043 UR ALBUMIN QUANTITATIVE: CPT | Performed by: PHYSICIAN ASSISTANT

## 2025-02-18 PROCEDURE — 80053 COMPREHEN METABOLIC PANEL: CPT | Performed by: PHYSICIAN ASSISTANT

## 2025-02-18 PROCEDURE — G2211 COMPLEX E/M VISIT ADD ON: HCPCS | Performed by: PHYSICIAN ASSISTANT

## 2025-02-18 PROCEDURE — 83036 HEMOGLOBIN GLYCOSYLATED A1C: CPT | Performed by: PHYSICIAN ASSISTANT

## 2025-02-18 PROCEDURE — 80061 LIPID PANEL: CPT | Performed by: PHYSICIAN ASSISTANT

## 2025-02-18 PROCEDURE — 36415 COLL VENOUS BLD VENIPUNCTURE: CPT | Performed by: PHYSICIAN ASSISTANT

## 2025-02-18 RX ORDER — TIRZEPATIDE 15 MG/.5ML
15 INJECTION, SOLUTION SUBCUTANEOUS
Qty: 6 ML | Refills: 0 | Status: SHIPPED | OUTPATIENT
Start: 2025-02-18 | End: 2025-05-19

## 2025-02-18 ASSESSMENT — PAIN SCALES - GENERAL: PAINLEVEL_OUTOF10: NO PAIN (0)

## 2025-02-18 NOTE — TELEPHONE ENCOUNTER
PA Initiation    RESUBMITTED REQUEST WITH A1C LAB FROM TODAY    Medication: MOUNJARO 15 MG/0.5ML SC SOAJ  Insurance Company: [employer] - Phone [phone] Fax [phone]  Pharmacy Filling the Rx:    Filling Pharmacy Phone:    Filling Pharmacy Fax:    Start Date: 2/18/2025    RESUBMITTED VIA EPA - PENDING DETERMINATION

## 2025-02-18 NOTE — TELEPHONE ENCOUNTER
Retail Pharmacy Prior Authorization Team   Phone: [phone]    PRIOR AUTHORIZATION DENIED    Medication: MOUNJARO 15 MG/0.5ML SC SOAJ  Insurance Company: [email or web address] - Phone [phone] Fax [phone]  Denial Date: 2/18/2025  Denial Reason(s): MUST PROVIDE DOCUMENTATION OF AN A1C OF 6.5% OR GREATER      Appeal Information: IF THE PROVIDER WOULD LIKE TO APPEAL THIS DECISION PLEASE PROVIDE THE PA TEAM WITH A LETTER OF MEDICAL NECESSITY      Patient Notified: NO

## 2025-02-18 NOTE — TELEPHONE ENCOUNTER
Prior Authorization Approval    Medication: MOUNJARO 15 MG/0.5ML SC SOAJ  Authorization Effective Date: 2/18/2025  Authorization Expiration Date: 2/18/2026  Insurance Company: [employer] - Phone [phone] Fax [phone]  Which Pharmacy is filling the prescription:    Pharmacy Notified: YES  Patient Notified: YES (faxed approval letter to pharmacy and notified patient via [employer] message)

## 2025-02-18 NOTE — PROGRESS NOTES
Assessment & Plan       ICD-10-CM    1. Type 2 diabetes mellitus with hyperglycemia, without long-term current use of insulin (H)  E11.65 Albumin Random Urine Quantitative with Creat Ratio     Comprehensive metabolic panel (BMP + Alb, Alk Phos, ALT, AST, Total. Bili, TP)     Hemoglobin A1c     MOUNJARO 15 MG/0.5ML SOAJ     Albumin Random Urine Quantitative with Creat Ratio     Comprehensive metabolic panel (BMP + Alb, Alk Phos, ALT, AST, Total. Bili, TP)     Hemoglobin A1c      2. Essential hypertension with goal blood pressure less than 140/90  I10 Comprehensive metabolic panel (BMP + Alb, Alk Phos, ALT, AST, Total. Bili, TP)     Comprehensive metabolic panel (BMP + Alb, Alk Phos, ALT, AST, Total. Bili, TP)      3. Hyperlipidemia LDL goal <70  E78.5 Lipid panel reflex to direct LDL Non-fasting     Lipid panel reflex to direct LDL Non-fasting      Work on Healthy diet and exercise. Getting heart rate elevated for 30 mins most days of week.  medical conditions are stable. meds refilled.  Will increase mounjaro to 15mg weekly. warning signs discussed. side effects discussed   Recheck 3 months.     The longitudinal plan of care for the diagnosis(es)/condition(s) as documented were addressed during this visit. Due to the added complexity in care, I will continue to support Antonietta in the subsequent management and with ongoing continuity of care.  Subjective   Antonietta is a 54 year old, presenting for the following health issues:  Recheck Medication    History of Present Illness       Diabetes:   She presents for follow up of diabetes.    She is not checking blood glucose.         She has no concerns regarding her diabetes at this time.   She is not experiencing numbness or burning in feet, excessive thirst, blurry vision, weight changes or redness, sores or blisters on feet. The patient has not had a diabetic eye exam in the last 12 months.          She eats 0-1 servings of fruits and vegetables daily.She consumes 0  "sweetened beverage(s) daily.She exercises with enough effort to increase her heart rate 9 or less minutes per day.  She exercises with enough effort to increase her heart rate 3 or less days per week. She is missing 1 dose(s) of medications per week.  She is not taking prescribed medications regularly due to remembering to take.     Diabetes Follow-up    How often are you checking your blood sugar? Not at all  What concerns do you have today about your diabetes? None   Do you have any of these symptoms? (Select all that apply)  No numbness or tingling in feet.  No redness, sores or blisters on feet.  No complaints of excessive thirst.  No reports of blurry vision.  No significant changes to weight.  Have you had a diabetic eye exam in the last 12 months? No            Hyperlipidemia Follow-Up    Are you regularly taking any medication or supplement to lower your cholesterol?   Yes- lipitor  Are you having muscle aches or other side effects that you think could be caused by your cholesterol lowering medication?  No    Hypertension Follow-up    Do you check your blood pressure regularly outside of the clinic? No   Are you following a low salt diet? No  Are your blood pressures ever more than 140 on the top number (systolic) OR more   than 90 on the bottom number (diastolic), for example 140/90? No    BP Readings from Last 2 Encounters:   02/18/25 138/83   11/08/24 122/81     Hemoglobin A1C (%)   Date Value   02/18/2025 8.4 (H)   01/11/2024 9.7 (H)   02/04/2021 9.2 (H)   01/16/2020 6.0 (H)     LDL Cholesterol Calculated (mg/dL)   Date Value   01/11/2024 54   09/21/2023 52   02/04/2021 64   01/16/2020 76         Review of Systems  Constitutional, HEENT, cardiovascular, pulmonary, gi and gu systems are negative, except as otherwise noted.      Objective    /83   Pulse 86   Temp 97  F (36.1  C) (Tympanic)   Resp 16   Ht 1.765 m (5' 9.5\")   Wt (!) 154.2 kg (340 lb)   LMP  (LMP Unknown)   SpO2 100%   BMI 49.49 " kg/m    Body mass index is 49.49 kg/m .  Physical Exam   GENERAL: alert and no distress  EYES: Eyes grossly normal to inspection, PERRL and conjunctivae and sclerae normal  HENT: ear canals and TM's normal, nose and mouth without ulcers or lesions  NECK: no adenopathy, no asymmetry, masses, or scars  RESP: lungs clear to auscultation - no rales, rhonchi or wheezes  CV: regular rate and rhythm, normal S1 S2, no S3 or S4, no murmur, click or rub, no peripheral edema  ABDOMEN: soft, nontender, no hepatosplenomegaly, no masses and bowel sounds normal  MS: no gross musculoskeletal defects noted, no edema  SKIN: no suspicious lesions or rashes  NEURO: Normal strength and tone, mentation intact and speech normal  PSYCH: mentation appears normal, affect normal/bright    Labs pending        Signed Electronically by: Jose Malik PA-C

## 2025-02-19 DIAGNOSIS — I10 ESSENTIAL HYPERTENSION WITH GOAL BLOOD PRESSURE LESS THAN 140/90: ICD-10-CM

## 2025-02-19 DIAGNOSIS — E11.65 TYPE 2 DIABETES MELLITUS WITH HYPERGLYCEMIA, WITHOUT LONG-TERM CURRENT USE OF INSULIN (H): ICD-10-CM

## 2025-02-20 RX ORDER — LOSARTAN POTASSIUM 100 MG/1
100 TABLET ORAL DAILY
Qty: 90 TABLET | Refills: 1 | OUTPATIENT
Start: 2025-02-20

## 2025-02-20 RX ORDER — ATORVASTATIN CALCIUM 10 MG/1
10 TABLET, FILM COATED ORAL DAILY
Qty: 90 TABLET | Refills: 1 | OUTPATIENT
Start: 2025-02-20

## 2025-04-15 ENCOUNTER — VIRTUAL VISIT (OUTPATIENT)
Dept: FAMILY MEDICINE | Facility: CLINIC | Age: 55
End: 2025-04-15
Payer: COMMERCIAL

## 2025-04-15 DIAGNOSIS — R59.0 MESENTERIC LYMPHADENOPATHY: ICD-10-CM

## 2025-04-15 DIAGNOSIS — N83.202 LEFT OVARIAN CYST: ICD-10-CM

## 2025-04-15 DIAGNOSIS — K52.9 GASTROENTERITIS: Primary | ICD-10-CM

## 2025-04-15 PROCEDURE — 98005 SYNCH AUDIO-VIDEO EST LOW 20: CPT | Performed by: PHYSICIAN ASSISTANT

## 2025-04-15 NOTE — PROGRESS NOTES
Antonietta is a 55 year old who is being evaluated via a billable video visit.    How would you like to obtain your AVS? MyChart  If the video visit is dropped, the invitation should be resent by: Text to cell phone: [phone]  Will anyone else be joining your video visit? No      Assessment & Plan       ICD-10-CM    1. Gastroenteritis  K52.9       2. Left ovarian cyst  N83.202 CT Abdomen Pelvis w Contrast      3. Mesenteric lymphadenopathy  R59.0 CT Abdomen Pelvis w Contrast      medical conditions are stable.  warning signs discussed.  Recheck 4 wks.         Subjective   Antonietta is a 55 year old, presenting for the following health issues:  Hospital F/U    HPI        Hospital Follow-up Visit:    Hospital/Nursing Home/IP Rehab Facility:  [facility]  Date of Admission: 04/10/2025  Date of Discharge: 04/12/2025  Reason(s) for Admission: stomach pain  Was the patient in the ICU or did the patient experience delirium during hospitalization?  No  Do you have any other stressors you would like to discuss with your provider? No    Problems taking medications regularly:  None  Medication changes since discharge: None  Problems adhering to non-medication therapy:  None    Summary of hospitalization:  CareEverywhere information obtained and reviewed-  From Hospital Note:   Hospital Course   Antonietta Christie is a(n) 55 y.o. who is coming in with GI symptoms of vomiting and diarrhea. She has had sxs for a few weeks - but particularly bothersome the last week.     When she arrived; she was hypotensive and tachycardic and improved with IVF.   CT abdomen noted gastroenteritis.    Assessment/plan:    Acute gastroenteritis  Severe sepsis  - Meets sepsis criteria  - pt with a few weeks of N/V/Diarrhea - particularly bad this week. Her mother is here in the ER with the same sxs.   - With recent travel; travelers diarrhea is not a consideration as she did not travel abroad. Was in [place]  - Quite dehydrated on arrival with tachycardia, hypotension  and elevated lactate. S/P IVF  - she looks stable   - CT suggests gastroenteritis  - Stool Clostridium difficile toxin negative  - Stool multiplex PCR negative  - Imodium as needed   - Lactate normal        Diagnostic Tests/Treatments reviewed.  Follow up needed: none  Other Healthcare Providers Involved in Patient s Care:         None  Update since discharge: 90% better         Plan of care communicated with patient                 Review of Systems  Constitutional, HEENT, cardiovascular, pulmonary, gi and gu systems are negative, except as otherwise noted.      Objective           Vitals:  No vitals were obtained today due to virtual visit.    Physical Exam   GENERAL: alert and no distress  EYES: Eyes grossly normal to inspection.  No discharge or erythema, or obvious scleral/conjunctival abnormalities.  RESP: No audible wheeze, cough, or visible cyanosis.    SKIN: Visible skin clear. No significant rash, abnormal pigmentation or lesions.  NEURO: Cranial nerves grossly intact.  Mentation and speech appropriate for age.  PSYCH: Appropriate affect, tone, and pace of words        Video-Visit Details    Type of service:  Video Visit   Originating Location (pt. Location): Home    Distant Location (provider location):  Off-site  Platform used for Video Visit: Zechariah  Signed Electronically by: Jose Malik PA-C

## 2025-04-20 DIAGNOSIS — E11.65 TYPE 2 DIABETES MELLITUS WITH HYPERGLYCEMIA, WITHOUT LONG-TERM CURRENT USE OF INSULIN (H): ICD-10-CM

## 2025-04-20 DIAGNOSIS — I10 ESSENTIAL HYPERTENSION WITH GOAL BLOOD PRESSURE LESS THAN 140/90: ICD-10-CM

## 2025-04-20 DIAGNOSIS — G25.81 RESTLESS LEGS SYNDROME (RLS): ICD-10-CM

## 2025-04-21 RX ORDER — HYDROCHLOROTHIAZIDE 25 MG/1
25 TABLET ORAL DAILY
Qty: 90 TABLET | Refills: 3 | Status: SHIPPED | OUTPATIENT
Start: 2025-04-21

## 2025-04-21 RX ORDER — ROPINIROLE 5 MG/1
TABLET, FILM COATED ORAL
Qty: 90 TABLET | Refills: 3 | Status: SHIPPED | OUTPATIENT
Start: 2025-04-21

## 2025-04-21 NOTE — TELEPHONE ENCOUNTER
Clinic RN: Please investigate patient's chart or contact patient if the information cannot be found because patient should have run out of this medication on 10/2024. Confirm patient is taking this medication as prescribed. Document findings and route refill encounter to provider for approval or denial.    Bambi Velázquez RN

## 2025-04-21 NOTE — TELEPHONE ENCOUNTER
Left message on answering machine for patient to call back to [phone].  Melissa Kimbrough BSN, RN

## 2025-04-22 RX ORDER — ASPIRIN 81 MG/1
81 TABLET, COATED ORAL
Qty: 90 TABLET | Refills: 4 | Status: SHIPPED | OUTPATIENT
Start: 2025-04-22

## 2025-04-22 NOTE — TELEPHONE ENCOUNTER
"Patient did return call to clinic. Is taking the Asprin 81 mg tablet daily. Has not stopped taking. Had a \"stock pile\" of this prescription for a while.  Last prescribed by previous provider, Christine Painter from [facility].    Patient would like for new PCP, Jose Malik, to prescribe this medication now.    Routing to provider to advise.      Sara Yu RN  Clinical Triage/Primary Care  [facility]      "

## 2025-04-22 NOTE — TELEPHONE ENCOUNTER
Left message on answering machine for patient to call back to [phone].  Melissa Kimbrough BSN, RN

## 2025-05-06 ENCOUNTER — OFFICE VISIT (OUTPATIENT)
Dept: FAMILY MEDICINE | Facility: CLINIC | Age: 55
End: 2025-05-06
Payer: COMMERCIAL

## 2025-05-06 VITALS
HEIGHT: 69 IN | RESPIRATION RATE: 18 BRPM | WEIGHT: 293 LBS | OXYGEN SATURATION: 98 % | TEMPERATURE: 98.2 F | BODY MASS INDEX: 43.4 KG/M2 | SYSTOLIC BLOOD PRESSURE: 116 MMHG | HEART RATE: 107 BPM | DIASTOLIC BLOOD PRESSURE: 79 MMHG

## 2025-05-06 DIAGNOSIS — E11.65 TYPE 2 DIABETES MELLITUS WITH HYPERGLYCEMIA, WITHOUT LONG-TERM CURRENT USE OF INSULIN (H): ICD-10-CM

## 2025-05-06 DIAGNOSIS — I10 ESSENTIAL HYPERTENSION WITH GOAL BLOOD PRESSURE LESS THAN 140/90: ICD-10-CM

## 2025-05-06 LAB
ANION GAP SERPL CALCULATED.3IONS-SCNC: 10 MMOL/L (ref 7–15)
BUN SERPL-MCNC: 14.1 MG/DL (ref 6–20)
CALCIUM SERPL-MCNC: 9.6 MG/DL (ref 8.8–10.4)
CHLORIDE SERPL-SCNC: 101 MMOL/L (ref 98–107)
CREAT SERPL-MCNC: 0.95 MG/DL (ref 0.51–0.95)
EGFRCR SERPLBLD CKD-EPI 2021: 70 ML/MIN/1.73M2
EST. AVERAGE GLUCOSE BLD GHB EST-MCNC: 183 MG/DL
GLUCOSE SERPL-MCNC: 208 MG/DL (ref 70–99)
HBA1C MFR BLD: 8 % (ref 0–5.6)
HCO3 SERPL-SCNC: 26 MMOL/L (ref 22–29)
POTASSIUM SERPL-SCNC: 4.7 MMOL/L (ref 3.4–5.3)
SODIUM SERPL-SCNC: 137 MMOL/L (ref 135–145)

## 2025-05-06 PROCEDURE — 36415 COLL VENOUS BLD VENIPUNCTURE: CPT | Performed by: PHYSICIAN ASSISTANT

## 2025-05-06 PROCEDURE — 83036 HEMOGLOBIN GLYCOSYLATED A1C: CPT | Performed by: PHYSICIAN ASSISTANT

## 2025-05-06 PROCEDURE — 99214 OFFICE O/P EST MOD 30 MIN: CPT | Performed by: PHYSICIAN ASSISTANT

## 2025-05-06 PROCEDURE — G2211 COMPLEX E/M VISIT ADD ON: HCPCS | Performed by: PHYSICIAN ASSISTANT

## 2025-05-06 PROCEDURE — 80048 BASIC METABOLIC PNL TOTAL CA: CPT | Performed by: PHYSICIAN ASSISTANT

## 2025-05-06 RX ORDER — LOSARTAN POTASSIUM 100 MG/1
100 TABLET ORAL DAILY
Qty: 90 TABLET | Refills: 1 | Status: SHIPPED | OUTPATIENT
Start: 2025-05-06

## 2025-05-06 RX ORDER — TIRZEPATIDE 15 MG/.5ML
15 INJECTION, SOLUTION SUBCUTANEOUS
Qty: 6 ML | Refills: 0 | Status: SHIPPED | OUTPATIENT
Start: 2025-05-06

## 2025-05-06 RX ORDER — ATORVASTATIN CALCIUM 10 MG/1
10 TABLET, FILM COATED ORAL DAILY
Qty: 90 TABLET | Refills: 1 | Status: SHIPPED | OUTPATIENT
Start: 2025-05-06

## 2025-05-06 RX ORDER — METFORMIN HYDROCHLORIDE 500 MG/1
2000 TABLET, EXTENDED RELEASE ORAL
Qty: 360 TABLET | Refills: 3 | Status: SHIPPED | OUTPATIENT
Start: 2025-05-06

## 2025-05-06 ASSESSMENT — PAIN SCALES - GENERAL: PAINLEVEL_OUTOF10: NO PAIN (0)

## 2025-05-06 NOTE — RESULT ENCOUNTER NOTE
MsElizabeth Christie,    All of your labs were normal/near normal for you.  Your diabetes is slowly improving. See you again in 3  months.     Please contact the clinic if you have additional questions.  Thank you.    Sincerely,    Jose Malik PA-C

## 2025-05-06 NOTE — PROGRESS NOTES
"  Assessment & Plan       ICD-10-CM    1. Type 2 diabetes mellitus with hyperglycemia, without long-term current use of insulin (H)  E11.65 Hemoglobin A1c     Basic metabolic panel  (Ca, Cl, CO2, Creat, Gluc, K, Na, BUN)     empagliflozin (JARDIANCE) 10 MG TABS tablet     atorvastatin (LIPITOR) 10 MG tablet     losartan (COZAAR) 100 MG tablet     metFORMIN (GLUCOPHAGE XR) 500 MG 24 hr tablet     MOUNJARO 15 MG/0.5ML SOAJ auto-injector pen     Hemoglobin A1c     Basic metabolic panel  (Ca, Cl, CO2, Creat, Gluc, K, Na, BUN)      2. Essential hypertension with goal blood pressure less than 140/90  I10 losartan (COZAAR) 100 MG tablet      Work on Healthy diet and exercise. Getting heart rate elevated for 30 mins most days of week.  medical conditions are stable. meds refilled.  Will add jardiance based on insurance coverage. warning signs discussed. side effects discussed   Recheck 3 months.       BMI  Estimated body mass index is 49.91 kg/m  as calculated from the following:    Height as of this encounter: 1.753 m (5' 9\").    Weight as of this encounter: 153.3 kg (338 lb).   Weight management plan: Discussed healthy diet and exercise guidelines    The longitudinal plan of care for the diagnosis(es)/condition(s) as documented were addressed during this visit. Due to the added complexity in care, I will continue to support Antonietta in the subsequent management and with ongoing continuity of care.    Subjective   Antonietta is a 55 year old, presenting for the following health issues:  Recheck Medication        5/6/2025     8:33 AM   Additional Questions   Roomed by deandre   Accompanied by self         5/6/2025     8:33 AM   Patient Reported Additional Medications   Patient reports taking the following new medications no     History of Present Illness       Diabetes:   She presents for follow up of diabetes.    She is not checking blood glucose.         She has no concerns regarding her diabetes at this time.   She is not " experiencing numbness or burning in feet, excessive thirst, blurry vision, weight changes or redness, sores or blisters on feet. The patient has not had a diabetic eye exam in the last 12 months.          She eats 2-3 servings of fruits and vegetables daily.She consumes 0 sweetened beverage(s) daily.She exercises with enough effort to increase her heart rate 9 or less minutes per day.  She exercises with enough effort to increase her heart rate 3 or less days per week. She is missing 1 dose(s) of medications per week.  She is not taking prescribed medications regularly due to remembering to take.          Diabetes Follow-up    How often are you checking your blood sugar? Not at all  What concerns do you have today about your diabetes? None   Do you have any of these symptoms? (Select all that apply)  No numbness or tingling in feet.  No redness, sores or blisters on feet.  No complaints of excessive thirst.  No reports of blurry vision.  No significant changes to weight.          Hyperlipidemia Follow-Up    Are you regularly taking any medication or supplement to lower your cholesterol?   Yes- lipitor  Are you having muscle aches or other side effects that you think could be caused by your cholesterol lowering medication?  No    Hypertension Follow-up    Do you check your blood pressure regularly outside of the clinic? No   Are you following a low salt diet? Yes  Are your blood pressures ever more than 140 on the top number (systolic) OR more   than 90 on the bottom number (diastolic), for example 140/90? No    BP Readings from Last 2 Encounters:   05/06/25 116/79   02/18/25 138/83     Hemoglobin A1C (%)   Date Value   05/06/2025 8.0 (H)   02/18/2025 8.4 (H)   02/04/2021 9.2 (H)   01/16/2020 6.0 (H)     LDL Cholesterol Calculated (mg/dL)   Date Value   02/18/2025 52   01/11/2024 54   02/04/2021 64   01/16/2020 76         Review of Systems  Constitutional, HEENT, cardiovascular, pulmonary, gi and gu systems are  "negative, except as otherwise noted.      Objective    /79   Pulse 107   Temp 98.2  F (36.8  C) (Tympanic)   Resp 18   Ht 1.753 m (5' 9\")   Wt (!) 153.3 kg (338 lb)   LMP  (LMP Unknown)   SpO2 98%   BMI 49.91 kg/m    Body mass index is 49.91 kg/m .  Physical Exam   GENERAL: alert and no distress  NECK: no adenopathy, no asymmetry, masses, or scars  RESP: lungs clear to auscultation - no rales, rhonchi or wheezes  CV: regular rate and rhythm, normal S1 S2, no S3 or S4, no murmur, click or rub, no peripheral edema  ABDOMEN: soft, nontender, no hepatosplenomegaly, no masses and bowel sounds normal  MS: no gross musculoskeletal defects noted, no edema    Labs pending      Results for orders placed or performed in visit on 05/06/25   Hemoglobin A1c     Status: Abnormal   Result Value Ref Range    Estimated Average Glucose 183 (H) <117 mg/dL    Hemoglobin A1C 8.0 (H) 0.0 - 5.6 %       Signed Electronically by: Jose Malik PA-C    "

## 2025-05-08 ENCOUNTER — TELEPHONE (OUTPATIENT)
Dept: FAMILY MEDICINE | Facility: CLINIC | Age: 55
End: 2025-05-08
Payer: COMMERCIAL

## 2025-05-13 NOTE — TELEPHONE ENCOUNTER
Note: Due to record-high volumes, our turn-around time is taking longer than usual . We are currently 3  business days behind in the pools.   We are working diligently to submit all requests in a timely manner and in the order they are received. Please only flag TRUE URGENT requests as high priority to the pool at this time.   If you have questions on status of PA's,  please send a note/message in the active PA encounter and send back to the [facility] PA pool [[id number]].    If you have questions about the turn-around time or about our process, please reach out to our supervisor Gabriella Lemus.   Thank you!   RPPA (Retail Pharmacy Prior Authorization) team    Retail Pharmacy Prior Authorization Team   Phone: [phone]    PA Initiation    Medication: JARDIANCE 10 MG PO TABS  Insurance Company: [employer] - Phone [phone] Fax [phone]  Pharmacy Filling [facility]: [id number] [place] [address]  Filling Pharmacy Phone: [phone]  Filling Pharmacy Fax:    Start Date: 5/13/2025    Started PA on CMM and a response of Your PA has been resolved, no additional PA is required. For further inquiries please contact the number on the back of the member prescription card.  Test claim shows that the medication was filled on 05/09/2025 at [id number].  Next fill date is 07/20/2025.  Pharmacy states patient picked up on 05/10/2025.

## 2025-06-14 ENCOUNTER — HEALTH MAINTENANCE LETTER (OUTPATIENT)
Age: 55
End: 2025-06-14

## 2025-06-25 ENCOUNTER — TELEPHONE (OUTPATIENT)
Dept: FAMILY MEDICINE | Facility: CLINIC | Age: 55
End: 2025-06-25
Payer: COMMERCIAL

## 2025-06-25 NOTE — TELEPHONE ENCOUNTER
Patient Quality Outreach    Patient is due for the following:   Cervical Cancer Screening - PAP Needed  Physical Preventive Adult Physical    Action(s) Taken:   Schedule a Adult Preventative    Type of outreach:    Sent WhoSay message.    Questions for provider review:    None         Jacqueline Hanks MA  Chart routed to None.

## 2025-07-21 ENCOUNTER — PATIENT OUTREACH (OUTPATIENT)
Dept: CARE COORDINATION | Facility: CLINIC | Age: 55
End: 2025-07-21
Payer: COMMERCIAL

## 2025-07-31 ENCOUNTER — TELEPHONE (OUTPATIENT)
Dept: GASTROENTEROLOGY | Facility: CLINIC | Age: 55
End: 2025-07-31
Payer: COMMERCIAL

## 2025-07-31 NOTE — TELEPHONE ENCOUNTER
"Endoscopy Scheduling Screen    Caller: patient    Have you had any respiratory illness or flu-like symptoms in the last 10 days?  No    Patient is ACTIVE on [employer].  Inform patient that all appointment instructions will be sent via [employer].    Review patient's insurance for any non participating payor.    Ordering/Referring Provider: ALEX BELLO   (If ordering provider performs procedure, schedule with ordering provider unless otherwise instructed. )    BMI: Estimated body mass index is 49.91 kg/m  as calculated from the following:    Height as of 5/6/25: 1.753 m (5' 9\").    Weight as of 5/6/25: 153.3 kg (338 lb).     Sedation Ordered  moderate sedation.   If patient BMI > 50 do not schedule in [facility].    If patient BMI > 45 do not schedule at [facility].    Are you taking methadone or Suboxone?  NO, No RN review required.    Have you been diagnosed and are being treated for severe PTSD or severe anxiety?  NO, No RN review required.    Are you taking any prescription medications for pain 3 or more times per week?   NO, No RN review required.    Do you have a history of malignant hyperthermia?  No    (Females) Are you currently pregnant?   No     Have you been diagnosed or told you have pulmonary hypertension?   No    Do you have an LVAD?  No    Have you been told you have moderate to severe sleep apnea?  Yes. Do you use a CPAP? No. (RN Review required for scheduling unless scheduling in Hospital.)     Have you been told you have COPD, asthma, or any other lung disease?  No    Has your doctor ordered any cardiac tests like echo, angiogram, stress test, ablation, or EKG, that you have not completed yet?  No    Do you  have a history of any heart conditions?  No     Have you ever had or are you waiting for an organ transplant?  No. Continue scheduling, no site restrictions.    Have you had a stroke or transient ischemic attack (TIA aka \"mini stroke\") in the last 2 years?   No.    Have you been diagnosed with or been told " "you have cirrhosis of the liver?   No.    Are you currently on dialysis?   No    Do you need assistance transferring?   No    BMI: Estimated body mass index is 49.91 kg/m  as calculated from the following:    Height as of 5/6/25: 1.753 m (5' 9\").    Weight as of 5/6/25: 153.3 kg (338 lb).     Is patients BMI > 40 and scheduling location [facility]?  Yes (If MAC sedation is ordered, schedule PAC eval)    Do you take an injectable or oral medication for weight loss or diabetes (excluding insulin)?  Yes, hold time can be up to 7 days. Please consult with you prescribing provider to discuss endoscopy recommendations. (Please schedule at least 7 days out.)    Do you take the medication Naltrexone?  No    Do you take blood thinners?  No       Prep   Are you currently on dialysis or do you have chronic kidney disease?  No    Do you have a diagnosis of diabetes?  Yes (Golytely Prep)    Do you have a diagnosis of cystic fibrosis (CF)?  No    On a regular basis do you go 3 -5 days between bowel movements?  No    BMI > 40?  Yes (Extended Prep)    Preferred Pharmacy:    [id number] [place] [facility] [address]  [address] [facility] [address]  Phone: [phone] Fax: [phone]    Final Scheduling Details     Procedure scheduled  Colonoscopy    Surgeon:  ACE     Date of procedure:  10/27/25     Pre-OP / PAC:   No - Not required for this site.    Location  [facility] - Per exclusion criteria.    Sedation   Moderate Sedation - Per order.      Patient Reminders:   You will receive a call from a Nurse to review instructions and health history.  This assessment must be completed prior to your procedure.  Failure to complete the Nurse assessment may result in the procedure being cancelled.      On the day of your procedure, please designate an adult(s) who can drive you home stay with you for the next 24 hours. The medicines used in the exam will make you sleepy. You will not be able to drive.      You cannot " take public transportation, ride share services, or non-medical taxi service without a responsible caregiver.  Medical transport services are allowed with the requirement that a responsible caregiver will receive you at your destination.  We require that drivers and caregivers are confirmed prior to your procedure.

## 2025-08-04 DIAGNOSIS — E11.65 TYPE 2 DIABETES MELLITUS WITH HYPERGLYCEMIA, WITHOUT LONG-TERM CURRENT USE OF INSULIN (H): ICD-10-CM

## 2025-08-04 RX ORDER — TIRZEPATIDE 15 MG/.5ML
15 INJECTION, SOLUTION SUBCUTANEOUS
Qty: 6 ML | Refills: 0 | Status: SHIPPED | OUTPATIENT
Start: 2025-08-04

## (undated) RX ORDER — SIMETHICONE 40MG/0.6ML
SUSPENSION, DROPS(FINAL DOSAGE FORM)(ML) ORAL
Status: DISPENSED
Start: 2024-10-21

## (undated) RX ORDER — FENTANYL CITRATE 50 UG/ML
INJECTION, SOLUTION INTRAMUSCULAR; INTRAVENOUS
Status: DISPENSED
Start: 2024-10-21